# Patient Record
Sex: FEMALE | Race: WHITE | NOT HISPANIC OR LATINO | Employment: FULL TIME | URBAN - METROPOLITAN AREA
[De-identification: names, ages, dates, MRNs, and addresses within clinical notes are randomized per-mention and may not be internally consistent; named-entity substitution may affect disease eponyms.]

---

## 2018-09-18 ENCOUNTER — ANNUAL EXAM (OUTPATIENT)
Dept: OBGYN CLINIC | Facility: CLINIC | Age: 48
End: 2018-09-18
Payer: COMMERCIAL

## 2018-09-18 VITALS
WEIGHT: 126 LBS | SYSTOLIC BLOOD PRESSURE: 116 MMHG | HEIGHT: 63 IN | DIASTOLIC BLOOD PRESSURE: 78 MMHG | BODY MASS INDEX: 22.32 KG/M2

## 2018-09-18 DIAGNOSIS — Z12.39 BREAST CANCER SCREENING: ICD-10-CM

## 2018-09-18 DIAGNOSIS — Z01.419 ENCNTR FOR GYN EXAM (GENERAL) (ROUTINE) W/O ABN FINDINGS: Primary | ICD-10-CM

## 2018-09-18 DIAGNOSIS — N92.0 MENORRHAGIA WITH REGULAR CYCLE: ICD-10-CM

## 2018-09-18 PROCEDURE — S0610 ANNUAL GYNECOLOGICAL EXAMINA: HCPCS | Performed by: NURSE PRACTITIONER

## 2018-09-18 RX ORDER — LEVONORGESTREL AND ETHINYL ESTRADIOL 100-20(84)
1 KIT ORAL DAILY
COMMUNITY
End: 2018-09-18 | Stop reason: SDUPTHER

## 2018-09-18 RX ORDER — FLUTICASONE PROPIONATE 50 MCG
2 SPRAY, SUSPENSION (ML) NASAL DAILY
COMMUNITY
End: 2019-05-20 | Stop reason: SDUPTHER

## 2018-09-18 RX ORDER — LEVONORGESTREL AND ETHINYL ESTRADIOL 100-20(84)
1 KIT ORAL DAILY
Qty: 90 TABLET | Refills: 3 | Status: SHIPPED | OUTPATIENT
Start: 2018-09-18 | End: 2019-10-24 | Stop reason: SDUPTHER

## 2018-09-18 NOTE — PROGRESS NOTES
Assessment/Plan   Diagnoses and all orders for this visit:    Encntr for gyn exam (general) (routine) w/o abn findings  -     GP Liquid-Based Pap + HPV Plus    Breast cancer screening  -     Mammo screening bilateral w cad; Future    Menorrhagia with regular cycle  -     Levonorgest-Eth Estrad 91-Day (CAMRESE LO) 0 1-0 02 & 0 01 MG TABS; Take 1 tablet by mouth daily    Other orders  -     fluticasone (FLONASE) 50 mcg/act nasal spray; 2 sprays into each nostril daily  -     Discontinue: Levonorgest-Eth Estrad 91-Day (CAMRESE LO) 0 1-0 02 & 0 01 MG TABS; Take 1 tablet by mouth daily        Discussion    Reviewed normal exam today  Pap with HPV done today  Pt would like to restart OCP to help with heavy bleeding, worked well in past    Reviewed when to start  What to do if misses pill  Recommended condom use for STD protection and back up method for at least first month after starting pill or if misses more than 2 pills in the pack  Reviewed common side effects of pill including N/V, HA, Breast Pain, Weight gain, bloating, mood swings  Reassured side effects diminished after first month or two on the pill  Reviewed clotting risk adn S/S of PE, DVT, MI, and stroke  Pt had a few questions about menopause  Menopause reviewed with patient including some effects she may notice, hot flashes, night sweats, mood swings, vaginal dryness  Reviewed common menstrual patterns  If begins to have symptoms and become bothersome can schedule appointment to discuss menopause  Normal breast exam today  Monthly SBEs advised  Mammograms yearly  Rx for mammogram given  Will dip urine in office to test for UTI  Encourage at least 1200 mg calcium citrate + 2000 IUs vitamin D3 divided through diet and supplement throughout the day  Encourage 30-40 min weight bearing exercise most days of week  Colon cancer screening with a colonoscopy is recommended starting at age 39 and reviewed benefits    All questions have been answered to her satisfaction  RTO for annual or sooner if needed    Subjective     Carmenza De Oliveira is a 53 y o  female old/new patient who presents for annual well woman exam    Last exam Thinks about 2 years ago Pap Normal per patient  Pap guidelines reviewed with patient  Pt would like Pap today  Pt denies any abnormal vaginal discharge, itching, or odor  Pt not currently in a sexual relationship and denies the need for STD testing today  Menstrual Cycle:  LMP: 18  Period Cycle (Days): 22  Period Duration (Days): 5-6  Period Pattern: Regular  Menstrual Flow: Heavy  Menstrual Control: Tampon, Maxi pad  Dysmenorrhea: None  Denies any menopausal symptoms  OB History      Para Term  AB Living    3 3 3     3    SAB TAB Ectopic Multiple Live Births   Contraception: None was previously on Sprintec, worked well would like to start to help with heavy menses  Practices monthly SBEs, no breast complaints today  Last Mammogram Never had one  Colonoscopy 10 years ago  Denies any bowel issues  Does state some mild dysuria at times  Denies any frequency, hesitancy, urgency, fevers, chills, or back pain  Has noticed some mild pain x a few months  Pt follows with PCP for regular check-ups and blood work  Review of Systems   All other systems reviewed and are negative        The following portions of the patient's history were reviewed and updated as appropriate: allergies, current medications, past family history, past medical history, past social history, past surgical history and problem list     Past Medical History:   Diagnosis Date    Cataracts, bilateral     Liver hemangioma        Past Surgical History:   Procedure Laterality Date    KNEE SURGERY Left 2006    RECTAL PROLAPSE REPAIR      SMALL INTESTINE SURGERY         Family History   Problem Relation Age of Onset    Hypertension Mother     Hyperlipidemia Mother     Heart attack Father     Hypertension Father     Hyperlipidemia Father  Migraines Sister     Migraines Brother     Breast cancer Maternal Grandmother        Social History     Social History    Marital status:      Spouse name: N/A    Number of children: N/A    Years of education: N/A     Occupational History    Not on file  Social History Main Topics    Smoking status: Never Smoker    Smokeless tobacco: Never Used    Alcohol use No    Drug use: Yes    Sexual activity: Not Currently     Partners: Male     Birth control/ protection: None     Other Topics Concern    Not on file     Social History Narrative    No narrative on file         Current Outpatient Prescriptions:     fluticasone (FLONASE) 50 mcg/act nasal spray, 2 sprays into each nostril daily, Disp: , Rfl:     Levonorgest-Eth Estrad 91-Day (CAMRESE LO) 0 1-0 02 & 0 01 MG TABS, Take 1 tablet by mouth daily, Disp: 90 tablet, Rfl: 3    Allergies   Allergen Reactions    No Active Allergies        Objective   Vitals:    09/18/18 0804   BP: 116/78   BP Location: Left arm   Patient Position: Sitting   Cuff Size: Standard   Weight: 57 2 kg (126 lb)   Height: 5' 2 5" (1 588 m)     Physical Exam   Constitutional: She is oriented to person, place, and time  She appears well-developed and well-nourished  HENT:   Head: Normocephalic  Neck: Normal range of motion  Neck supple  No tracheal deviation present  No thyromegaly present  Cardiovascular: Normal rate, regular rhythm and normal heart sounds  Pulmonary/Chest: Effort normal and breath sounds normal  Right breast exhibits no inverted nipple, no mass, no nipple discharge, no skin change and no tenderness  Left breast exhibits no inverted nipple, no mass, no nipple discharge, no skin change and no tenderness  Breasts are symmetrical    Abdominal: Soft  Bowel sounds are normal  She exhibits no distension and no mass  There is no tenderness  There is no rebound and no guarding     Genitourinary: Vagina normal and uterus normal  Rectal exam shows guaiac negative stool  No breast swelling, tenderness, discharge or bleeding  No labial fusion  There is no rash, tenderness, lesion or injury on the right labia  There is no rash, tenderness, lesion or injury on the left labia  Cervix exhibits no motion tenderness, no discharge and no friability  Right adnexum displays no mass, no tenderness and no fullness  Left adnexum displays no mass, no tenderness and no fullness  Musculoskeletal: Normal range of motion  Neurological: She is alert and oriented to person, place, and time  Skin: Skin is warm and dry  Psychiatric: She has a normal mood and affect   Her behavior is normal  Judgment and thought content normal

## 2018-09-23 LAB
HPV HR 12 DNA CVX QL NAA+PROBE: NOT DETECTED
HPV16 DNA SPEC QL NAA+PROBE: NOT DETECTED
HPV18 DNA SPEC QL NAA+PROBE: NOT DETECTED
THIN PREP CVX: NORMAL

## 2019-05-14 ENCOUNTER — HOSPITAL ENCOUNTER (OUTPATIENT)
Dept: MAMMOGRAPHY | Facility: HOSPITAL | Age: 49
Discharge: HOME | End: 2019-05-14
Payer: COMMERCIAL

## 2019-05-14 VITALS — HEIGHT: 63 IN | BODY MASS INDEX: 22.32 KG/M2 | WEIGHT: 126 LBS

## 2019-05-14 DIAGNOSIS — Z12.39 BREAST CANCER SCREENING: ICD-10-CM

## 2019-05-14 PROCEDURE — 77063 BREAST TOMOSYNTHESIS BI: CPT

## 2019-05-14 PROCEDURE — 77067 SCR MAMMO BI INCL CAD: CPT

## 2019-05-20 ENCOUNTER — OFFICE VISIT (OUTPATIENT)
Dept: URGENT CARE | Facility: CLINIC | Age: 49
End: 2019-05-20

## 2019-05-20 VITALS
SYSTOLIC BLOOD PRESSURE: 118 MMHG | OXYGEN SATURATION: 98 % | DIASTOLIC BLOOD PRESSURE: 78 MMHG | TEMPERATURE: 98.6 F | HEART RATE: 58 BPM | RESPIRATION RATE: 16 BRPM

## 2019-05-20 DIAGNOSIS — Z91.09 ENVIRONMENTAL ALLERGIES: Primary | ICD-10-CM

## 2019-05-20 PROBLEM — F41.1 GENERALIZED ANXIETY DISORDER: Status: ACTIVE | Noted: 2019-04-08

## 2019-05-20 PROBLEM — J30.9 ALLERGIC RHINITIS: Status: ACTIVE | Noted: 2019-05-20

## 2019-05-20 PROBLEM — H93.19 TINNITUS: Status: ACTIVE | Noted: 2019-05-20

## 2019-05-20 RX ORDER — ALPRAZOLAM 0.25 MG/1
TABLET ORAL DAILY
COMMUNITY

## 2019-05-20 RX ORDER — FLUTICASONE PROPIONATE 50 MCG
2 SPRAY, SUSPENSION (ML) NASAL DAILY
Qty: 1 BOTTLE | Refills: 0 | Status: SHIPPED | OUTPATIENT
Start: 2019-05-20 | End: 2021-02-23

## 2019-05-22 ENCOUNTER — OFFICE VISIT (OUTPATIENT)
Dept: URGENT CARE | Facility: CLINIC | Age: 49
End: 2019-05-22

## 2019-05-22 VITALS
OXYGEN SATURATION: 100 % | HEART RATE: 56 BPM | DIASTOLIC BLOOD PRESSURE: 80 MMHG | RESPIRATION RATE: 16 BRPM | SYSTOLIC BLOOD PRESSURE: 126 MMHG | TEMPERATURE: 98.9 F

## 2019-05-22 DIAGNOSIS — R05.9 COUGH: Primary | ICD-10-CM

## 2019-05-22 DIAGNOSIS — J35.8 TONSILLOLITH: ICD-10-CM

## 2019-05-22 DIAGNOSIS — Z91.09 ENVIRONMENTAL ALLERGIES: ICD-10-CM

## 2019-05-22 RX ORDER — BENZONATATE 100 MG/1
100 CAPSULE ORAL 3 TIMES DAILY PRN
Qty: 20 CAPSULE | Refills: 0 | Status: SHIPPED | OUTPATIENT
Start: 2019-05-22 | End: 2021-02-23

## 2019-09-18 ENCOUNTER — OFFICE VISIT (OUTPATIENT)
Dept: URGENT CARE | Facility: CLINIC | Age: 49
End: 2019-09-18

## 2019-09-18 VITALS
TEMPERATURE: 98.6 F | OXYGEN SATURATION: 98 % | HEART RATE: 56 BPM | RESPIRATION RATE: 16 BRPM | DIASTOLIC BLOOD PRESSURE: 82 MMHG | SYSTOLIC BLOOD PRESSURE: 128 MMHG

## 2019-09-18 DIAGNOSIS — R42 VERTIGO: Primary | ICD-10-CM

## 2019-09-18 RX ORDER — MECLIZINE HCL 12.5 MG/1
12.5 TABLET ORAL EVERY 8 HOURS PRN
Qty: 20 TABLET | Refills: 0 | Status: SHIPPED | OUTPATIENT
Start: 2019-09-18 | End: 2021-02-23

## 2019-09-18 NOTE — PROGRESS NOTES
[facility]        NAME: Kary Cobb is a 51 y o  female  : 1970    MRN: [id number]  DATE: 2019  TIME: 2:28 PM    Assessment and Plan   Vertigo [R42]  1  Vertigo  meclizine (ANTIVERT) 12 5 MG tablet         Patient Instructions     Take Meclizine as needed for vertigo symptoms  Increase your water intake  Change positions slowly  Follow up with your PCP or return to the clinic if symptoms worsen or persist more than 5-7 days  Proceed to  ER if symptoms worsen  Chief Complaint     Chief Complaint   Patient presents with    Dizziness         History of Present Illness       Pt was awoken from sleep 2 nights ago by her dog and felt dizzy when sitting up  Symptoms passed quickly  She has had a few "waves" of dizziness since then briefly  Denies falling or syncope  She vomited 2-3 times due to the dizziness and nausea  Occurs when turning her head, moving her eyes, or changing positions quickly  She has been eating regularly  Review of Systems   Review of Systems   Constitutional: Negative  HENT: Negative  Eyes: Negative  Respiratory: Negative  Cardiovascular: Negative  Neurological: Positive for dizziness (when changing positions or turning her head quickly)  All other systems reviewed and are negative          Current Medications       Current Outpatient Medications:     ALPRAZolam (XANAX) 0 25 mg tablet, Daily, Disp: , Rfl:     Levonorgest-Eth Estrad -Day (CAMRESE LO) 0 1-0 02 & 0 01 MG TABS, Take 1 tablet by mouth daily, Disp: 90 tablet, Rfl: 3    benzonatate (TESSALON PERLES) 100 mg capsule, Take 1 capsule (100 mg total) by mouth 3 (three) times a day as needed for cough Swallow whole, do not crush or chew (Patient not taking: Reported on 2019), Disp: 20 capsule, Rfl: 0    fluticasone (FLONASE) 50 mcg/act nasal spray, 2 sprays into each nostril daily for 30 days, Disp: 1 Bottle, Rfl: 0    meclizine (ANTIVERT) 12 5 MG tablet, Take 1 tablet (12 5 mg total) by mouth every 8 (eight) hours as needed for dizziness for up to 7 days, Disp: 20 tablet, Rfl: 0    Current Allergies     Allergies as of 09/18/2019 - Reviewed 09/18/2019   Allergen Reaction Noted    No active allergies              The following portions of the patient's history were reviewed and updated as appropriate: allergies, current medications, past family history, past medical history, past social history, past surgical history and problem list      Past Medical History:   Diagnosis Date    Allergic rhinitis     Cataracts, bilateral     Liver hemangioma        Past Surgical History:   Procedure Laterality Date    KNEE SURGERY Left 12/2006    RECTAL PROLAPSE REPAIR      SMALL INTESTINE SURGERY  2004       Family History   Problem Relation Age of Onset    Hypertension Mother     Hyperlipidemia Mother     Heart attack Father     Hypertension Father     Hyperlipidemia Father     Migraines Sister     Migraines Brother     Breast cancer Maternal Grandmother 55         Medications have been verified  Objective   /82 (BP Location: Right arm, Patient Position: Sitting, Cuff Size: Adult)   Pulse 56   Temp 98 6 °F (37 °C) (Tympanic)   Resp 16   SpO2 98%   Breastfeeding? No        Physical Exam     Physical Exam   Constitutional: She is oriented to person, place, and time  She appears well-developed and well-nourished  HENT:   Head: Normocephalic and atraumatic  Right Ear: Hearing, tympanic membrane, external ear and ear canal normal    Left Ear: Hearing, tympanic membrane, external ear and ear canal normal    Nose: Nose normal    Mouth/Throat: Oropharynx is clear and moist    Vertigo symptoms reproducible in the office when turning her head quickly  Symptoms resolve within a few seconds when sitting still  Eyes: Pupils are equal, round, and reactive to light  Conjunctivae and EOM are normal  Right eye exhibits no nystagmus   Left eye exhibits no nystagmus  Cardiovascular: Normal rate, regular rhythm and normal heart sounds  Pulmonary/Chest: Effort normal and breath sounds normal    Neurological: She is alert and oriented to person, place, and time  Skin: Skin is warm and dry  Psychiatric: She has a normal mood and affect  Her behavior is normal    Vitals reviewed

## 2019-09-18 NOTE — ASSESSMENT & PLAN NOTE
Meclizine PRN  Increase water intake  Stay well hydrated  Consider vestibular rehab if no improvement

## 2019-09-18 NOTE — PATIENT INSTRUCTIONS
Take Meclizine as needed for vertigo symptoms  Increase your water intake  Change positions slowly  Follow up with your PCP or return to the clinic if symptoms worsen or persist more than 5-7 days  Vertigo   AMBULATORY CARE:   Vertigo  is a condition that causes you to feel dizzy  You may feel that you or everything around you is moving or spinning  You may also feel like you are being pulled down or toward your side  Common symptoms that may happen with vertigo:   · Nausea or vomiting    · Trouble with your balance    · Sensitivity to light or sound    · Weakness, slurred speech, problems seeing or moving, or increased sleepiness    · Facial weakness and headache    · Hearing loss, ear fullness or pain, or hearing ringing sounds    · Fast, uncontrolled movement of your eyes  Seek care immediately if:   · You have a headache and a stiff neck  · You have shaking chills and a fever  · You vomit over and over with no relief  · You have blood, pus, or fluid coming out of your ears  · You are confused  Contact your healthcare provider if:   · Your symptoms do not get better with treatment  · You have questions about your condition or care  Treatment for vertigo  may include resting in bed or avoid certain activities for a time  You may need to decrease or stop taking medicines that are causing your vertigo  Medicines may also be prescribed to help relieve your symptoms  Manage your symptoms:   · Do not drive , walk without help, or operate heavy machinery when you are dizzy  · Move slowly  when you move from one position to another position  Get up slowly from sitting or lying down  Sit or lie down right away if you feel dizzy  · Drink plenty of liquids  Liquids help prevent dehydration  Ask how much liquid to drink each day and which liquids are best for you      · Vestibular and balance rehabilitation therapy (VBRT)  is used to teach you exercises to improve your balance and strength  These exercises may help decrease your vertigo and improve your balance  Ask for more information about this therapy  Follow up with your healthcare provider as directed:  Write down your questions so you remember to ask them during your visits  © 2017 [employer] Information is for End User's use only and may not be sold, redistributed or otherwise used for commercial purposes  All illustrations and images included in CareNotes® are the copyrighted property of [employer]  or [employer]  The above information is an  only  It is not intended as medical advice for individual conditions or treatments  Talk to your doctor, nurse or pharmacist before following any medical regimen to see if it is safe and effective for you

## 2019-10-15 ENCOUNTER — APPOINTMENT (OUTPATIENT)
Dept: URGENT CARE | Facility: CLINIC | Age: 49
End: 2019-10-15

## 2019-10-15 ENCOUNTER — TRANSCRIBE ORDERS (OUTPATIENT)
Dept: URGENT CARE | Facility: CLINIC | Age: 49
End: 2019-10-15

## 2019-10-15 VITALS
SYSTOLIC BLOOD PRESSURE: 122 MMHG | DIASTOLIC BLOOD PRESSURE: 80 MMHG | WEIGHT: 131 LBS | BODY MASS INDEX: 24.11 KG/M2 | HEIGHT: 62 IN

## 2019-10-15 DIAGNOSIS — Z00.8 ENCOUNTER FOR BIOMETRIC SCREENING: Primary | ICD-10-CM

## 2019-10-15 DIAGNOSIS — Z00.8 ENCOUNTER FOR BIOMETRIC SCREENING: ICD-10-CM

## 2019-10-15 DIAGNOSIS — Z01.89 ENCOUNTER FOR TOBACCO USE SCREENING: Primary | ICD-10-CM

## 2019-10-15 DIAGNOSIS — Z13.6 SCREENING FOR CARDIOVASCULAR CONDITION: ICD-10-CM

## 2019-10-15 PROCEDURE — G0480 DRUG TEST DEF 1-7 CLASSES: HCPCS | Performed by: NURSE PRACTITIONER

## 2019-10-15 PROCEDURE — 80323 ALKALOIDS NOS: CPT | Performed by: NURSE PRACTITIONER

## 2019-10-18 LAB
COTININE SERPL-MCNC: 1.3 NG/ML
NICOTINE SERPL-MCNC: NORMAL NG/ML

## 2019-10-24 ENCOUNTER — ANNUAL EXAM (OUTPATIENT)
Dept: OBGYN CLINIC | Facility: CLINIC | Age: 49
End: 2019-10-24
Payer: COMMERCIAL

## 2019-10-24 VITALS
SYSTOLIC BLOOD PRESSURE: 116 MMHG | DIASTOLIC BLOOD PRESSURE: 78 MMHG | WEIGHT: 131 LBS | HEIGHT: 63 IN | BODY MASS INDEX: 23.21 KG/M2

## 2019-10-24 DIAGNOSIS — Z12.31 ENCOUNTER FOR SCREENING MAMMOGRAM FOR MALIGNANT NEOPLASM OF BREAST: ICD-10-CM

## 2019-10-24 DIAGNOSIS — N92.0 MENORRHAGIA WITH REGULAR CYCLE: ICD-10-CM

## 2019-10-24 DIAGNOSIS — Z01.419 GYNECOLOGIC EXAM NORMAL: Primary | ICD-10-CM

## 2019-10-24 PROCEDURE — S0612 ANNUAL GYNECOLOGICAL EXAMINA: HCPCS | Performed by: PHYSICIAN ASSISTANT

## 2019-10-24 RX ORDER — LEVONORGESTREL AND ETHINYL ESTRADIOL 100-20(84)
1 KIT ORAL DAILY
Qty: 90 TABLET | Refills: 3 | Status: SHIPPED | OUTPATIENT
Start: 2019-10-24 | End: 2020-12-01

## 2019-10-24 NOTE — ASSESSMENT & PLAN NOTE
Pap guidelines reviewed  Patient currently has menses  Will plan to repeat pap in 1 year to attempt to capture transformation zone, reviewed with patient recommend to evaluate TZ once every 3 years  Reassuring not sexually active currently and history of always normal pap smears  Will plan to continue Camrese OCP  Return to office for annual or as needed

## 2019-10-24 NOTE — PROGRESS NOTES
Assessment/Plan   Problem List Items Addressed This Visit        Other    Gynecologic exam normal - Primary     Pap guidelines reviewed  Patient currently has menses  Will plan to repeat pap in 1 year to attempt to capture transformation zone, reviewed with patient recommend to evaluate TZ once every 3 years  Reassuring not sexually active currently and history of always normal pap smears  Will plan to continue Camrese OCP  Return to office for annual or as needed  Other Visit Diagnoses     Encounter for screening mammogram for malignant neoplasm of breast        Relevant Orders    Mammo screening bilateral w 3d & cad    Menorrhagia with regular cycle        Relevant Medications    Levonorgest-Eth Estrad 91-Day (CAMRESE LO) 0 1-0 02 & 0 01 MG TABS          Subjective:     Patient ID: Cha Santos is a 51 y o  y o  female  HPI  52 yo seen for annual exam  Currently on Camrese OCP, tolerating well would like to continue  Denies bowel or bladder issues  Last pap: 9/18/2018 NILM (-)HRHPV  No TZ seen  Last mammogram: 5/14/2019 BIRADS 1: Negative  The following portions of the patient's history were reviewed and updated as appropriate:   She  has a past medical history of Allergic rhinitis, Cataracts, bilateral, and Liver hemangioma  She   Patient Active Problem List    Diagnosis Date Noted    Gynecologic exam normal 10/24/2019    Vertigo 09/18/2019    Cough 05/22/2019    Tonsillolith 05/22/2019    Environmental allergies 05/20/2019    Tinnitus 05/20/2019    Generalized anxiety disorder 04/08/2019     She  has a past surgical history that includes Small intestine surgery (2004); Rectal prolapse repair; and Knee surgery (Left, 12/2006)  Her family history includes Breast cancer (age of onset: 54) in her maternal grandmother; Heart attack in her father; Hyperlipidemia in her father and mother; Hypertension in her father and mother; Migraines in her brother and sister    She  reports that she quit smoking about 25 years ago  Her smoking use included cigarettes  She smoked 0 25 packs per day  She has never used smokeless tobacco  She reports that she does not drink alcohol or use drugs  Current Outpatient Medications   Medication Sig Dispense Refill    ALPRAZolam (XANAX) 0 25 mg tablet Daily      benzonatate (TESSALON PERLES) 100 mg capsule Take 1 capsule (100 mg total) by mouth 3 (three) times a day as needed for cough Swallow whole, do not crush or chew (Patient not taking: Reported on 2019) 20 capsule 0    fluticasone (FLONASE) 50 mcg/act nasal spray 2 sprays into each nostril daily for 30 days 1 Bottle 0    Levonorgest-Eth Estrad 91-Day (CAMRESE LO) 0 1-0 02 & 0 01 MG TABS Take 1 tablet by mouth daily 90 tablet 3    meclizine (ANTIVERT) 12 5 MG tablet Take 1 tablet (12 5 mg total) by mouth every 8 (eight) hours as needed for dizziness for up to 7 days 20 tablet 0     No current facility-administered medications for this visit  She is allergic to no active allergies       Menstrual History:  OB History        3    Para   3    Term   3            AB        Living   3       SAB        TAB        Ectopic        Multiple        Live Births                    Menarche age: 16  Patient's last menstrual period was 10/21/2019 (exact date)  Period Cycle (Days): 90  Period Duration (Days): 4-5  Period Pattern: Regular  Menstrual Flow: Moderate  Dysmenorrhea: None      Review of Systems   Constitutional: Negative for fatigue, fever and unexpected weight change  HENT: Negative for dental problem and sinus pressure  Eyes: Negative for visual disturbance  Respiratory: Negative for cough, shortness of breath and wheezing  Cardiovascular: Negative for chest pain  Gastrointestinal: Negative for abdominal pain, blood in stool, constipation, diarrhea, nausea and vomiting  Endocrine: Negative for polydipsia     Genitourinary: Negative for difficulty urinating, dyspareunia, dysuria, frequency, hematuria, pelvic pain and urgency  Musculoskeletal: Negative for arthralgias and back pain  Neurological: Negative for dizziness, seizures, light-headedness and headaches  Psychiatric/Behavioral: Negative for suicidal ideas  The patient is not nervous/anxious  Objective:  Vitals:    10/24/19 1509   BP: 116/78   BP Location: Left arm   Patient Position: Sitting   Cuff Size: Standard   Weight: 59 4 kg (131 lb)   Height: 5' 3" (1 6 m)      Physical Exam   Constitutional: She is oriented to person, place, and time  She appears well-developed and well-nourished  Genitourinary: Rectum normal and uterus normal  There is no rash, tenderness, lesion, injury or Bartholin's cyst on the right labia  There is no rash, tenderness, lesion, injury or Bartholin's cyst on the left labia  Vagina exhibits no lesion  There is bleeding in the vagina  No erythema or tenderness in the vagina  No signs of injury around the vagina  No vaginal discharge found  Right adnexum does not display mass, does not display tenderness and does not display fullness  Left adnexum does not display mass, does not display tenderness and does not display fullness  Cervix does not exhibit motion tenderness, lesion or discharge  Uterus is not enlarged, tender, exhibiting a mass, irregular (is regular) or mobile  Rectal exam shows no external hemorrhoid, no internal hemorrhoid, no fissure, no mass, no tenderness, anal tone normal and guaiac negative stool  HENT:   Head: Normocephalic and atraumatic  Neck: No thyromegaly present  Cardiovascular: Normal rate, regular rhythm and normal heart sounds  Exam reveals no gallop and no friction rub  No murmur heard  Pulmonary/Chest: Effort normal and breath sounds normal  No respiratory distress  She has no wheezes  Right breast exhibits no inverted nipple, no mass, no nipple discharge, no skin change and no tenderness   Left breast exhibits no inverted nipple, no mass, no nipple discharge, no skin change and no tenderness  No breast swelling, tenderness, discharge or bleeding  Breasts are symmetrical    Abdominal: Soft  She exhibits no distension and no mass  There is no tenderness  There is no rebound and no guarding  No hernia  Lymphadenopathy:     She has no cervical adenopathy  Right: No inguinal adenopathy present  Left: No inguinal adenopathy present  Neurological: She is alert and oriented to person, place, and time  Skin: Skin is warm and dry  Psychiatric: She has a normal mood and affect   Her behavior is normal

## 2020-06-22 ENCOUNTER — LAB REQUISITION (OUTPATIENT)
Dept: LAB | Facility: HOSPITAL | Age: 50
End: 2020-06-22
Payer: COMMERCIAL

## 2020-06-22 ENCOUNTER — APPOINTMENT (OUTPATIENT)
Dept: URGENT CARE | Facility: CLINIC | Age: 50
End: 2020-06-22

## 2020-06-22 DIAGNOSIS — E55.9 VITAMIN D DEFICIENCY, UNSPECIFIED: ICD-10-CM

## 2020-06-22 DIAGNOSIS — E55.9 VITAMIN D DEFICIENCY: ICD-10-CM

## 2020-06-22 DIAGNOSIS — Z11.59 ENCOUNTER FOR SCREENING FOR OTHER VIRAL DISEASES: ICD-10-CM

## 2020-06-22 DIAGNOSIS — Z00.00 ENCOUNTER FOR GENERAL ADULT MEDICAL EXAMINATION WITHOUT ABNORMAL FINDINGS: ICD-10-CM

## 2020-06-22 DIAGNOSIS — E78.5 HYPERLIPIDEMIA, UNSPECIFIED: ICD-10-CM

## 2020-06-22 DIAGNOSIS — E78.5 HYPERLIPIDEMIA, UNSPECIFIED HYPERLIPIDEMIA TYPE: Primary | ICD-10-CM

## 2020-06-22 DIAGNOSIS — Z00.00 WELL ADULT EXAM: ICD-10-CM

## 2020-06-22 LAB
25(OH)D3 SERPL-MCNC: 20.3 NG/ML (ref 30–100)
ALBUMIN SERPL BCP-MCNC: 3.5 G/DL (ref 3.5–5)
ALP SERPL-CCNC: 53 U/L (ref 46–116)
ALT SERPL W P-5'-P-CCNC: 14 U/L (ref 12–78)
ANION GAP SERPL CALCULATED.3IONS-SCNC: 5 MMOL/L (ref 4–13)
AST SERPL W P-5'-P-CCNC: 12 U/L (ref 5–45)
BACTERIA UR QL AUTO: ABNORMAL /HPF
BASOPHILS # BLD AUTO: 0.04 THOUSANDS/ΜL (ref 0–0.1)
BASOPHILS NFR BLD AUTO: 1 % (ref 0–1)
BILIRUB SERPL-MCNC: 0.49 MG/DL (ref 0.2–1)
BILIRUB UR QL STRIP: NEGATIVE
BUN SERPL-MCNC: 9 MG/DL (ref 5–25)
CALCIUM SERPL-MCNC: 9.4 MG/DL (ref 8.3–10.1)
CHLORIDE SERPL-SCNC: 109 MMOL/L (ref 100–108)
CHOLEST SERPL-MCNC: 227 MG/DL (ref 50–200)
CLARITY UR: ABNORMAL
CO2 SERPL-SCNC: 24 MMOL/L (ref 21–32)
COLOR UR: ABNORMAL
CREAT SERPL-MCNC: 0.83 MG/DL (ref 0.6–1.3)
EOSINOPHIL # BLD AUTO: 0.04 THOUSAND/ΜL (ref 0–0.61)
EOSINOPHIL NFR BLD AUTO: 1 % (ref 0–6)
ERYTHROCYTE [DISTWIDTH] IN BLOOD BY AUTOMATED COUNT: 13.9 % (ref 11.6–15.1)
GFR SERPL CREATININE-BSD FRML MDRD: 83 ML/MIN/1.73SQ M
GLUCOSE SERPL-MCNC: 78 MG/DL (ref 65–140)
GLUCOSE UR STRIP-MCNC: NEGATIVE MG/DL
HCT VFR BLD AUTO: 40.6 % (ref 34.8–46.1)
HDLC SERPL-MCNC: 69 MG/DL
HGB BLD-MCNC: 13 G/DL (ref 11.5–15.4)
HGB UR QL STRIP.AUTO: ABNORMAL
HYALINE CASTS #/AREA URNS LPF: ABNORMAL /LPF
IMM GRANULOCYTES # BLD AUTO: 0.02 THOUSAND/UL (ref 0–0.2)
IMM GRANULOCYTES NFR BLD AUTO: 0 % (ref 0–2)
KETONES UR STRIP-MCNC: NEGATIVE MG/DL
LDLC SERPL CALC-MCNC: 140 MG/DL (ref 0–100)
LEUKOCYTE ESTERASE UR QL STRIP: NEGATIVE
LYMPHOCYTES # BLD AUTO: 1.14 THOUSANDS/ΜL (ref 0.6–4.47)
LYMPHOCYTES NFR BLD AUTO: 23 % (ref 14–44)
MCH RBC QN AUTO: 30.2 PG (ref 26.8–34.3)
MCHC RBC AUTO-ENTMCNC: 32 G/DL (ref 31.4–37.4)
MCV RBC AUTO: 94 FL (ref 82–98)
MONOCYTES # BLD AUTO: 0.55 THOUSAND/ΜL (ref 0.17–1.22)
MONOCYTES NFR BLD AUTO: 11 % (ref 4–12)
NEUTROPHILS # BLD AUTO: 3.1 THOUSANDS/ΜL (ref 1.85–7.62)
NEUTS SEG NFR BLD AUTO: 64 % (ref 43–75)
NITRITE UR QL STRIP: NEGATIVE
NON-SQ EPI CELLS URNS QL MICRO: ABNORMAL /HPF
NONHDLC SERPL-MCNC: 158 MG/DL
NRBC BLD AUTO-RTO: 0 /100 WBCS
PH UR STRIP.AUTO: 5 [PH]
PLATELET # BLD AUTO: 365 THOUSANDS/UL (ref 149–390)
PMV BLD AUTO: 10.4 FL (ref 8.9–12.7)
POTASSIUM SERPL-SCNC: 4.2 MMOL/L (ref 3.5–5.3)
PROT SERPL-MCNC: 7.3 G/DL (ref 6.4–8.2)
PROT UR STRIP-MCNC: NEGATIVE MG/DL
RBC # BLD AUTO: 4.3 MILLION/UL (ref 3.81–5.12)
RBC #/AREA URNS AUTO: ABNORMAL /HPF
SODIUM SERPL-SCNC: 138 MMOL/L (ref 136–145)
SP GR UR STRIP.AUTO: 1.02 (ref 1–1.03)
T4 FREE SERPL-MCNC: 0.84 NG/DL (ref 0.76–1.46)
TRIGL SERPL-MCNC: 89 MG/DL
TSH SERPL DL<=0.05 MIU/L-ACNC: 9.03 UIU/ML (ref 0.36–3.74)
UROBILINOGEN UR QL STRIP.AUTO: 0.2 E.U./DL
WBC # BLD AUTO: 4.89 THOUSAND/UL (ref 4.31–10.16)
WBC #/AREA URNS AUTO: ABNORMAL /HPF

## 2020-06-22 PROCEDURE — 80053 COMPREHEN METABOLIC PANEL: CPT

## 2020-06-22 PROCEDURE — 82306 VITAMIN D 25 HYDROXY: CPT

## 2020-06-22 PROCEDURE — 84443 ASSAY THYROID STIM HORMONE: CPT

## 2020-06-22 PROCEDURE — 84439 ASSAY OF FREE THYROXINE: CPT

## 2020-06-22 PROCEDURE — 85025 COMPLETE CBC W/AUTO DIFF WBC: CPT

## 2020-06-22 PROCEDURE — 80061 LIPID PANEL: CPT

## 2020-06-22 PROCEDURE — 81001 URINALYSIS AUTO W/SCOPE: CPT

## 2020-07-10 ENCOUNTER — TRANSCRIBE ORDERS (OUTPATIENT)
Dept: ADMINISTRATIVE | Facility: HOSPITAL | Age: 50
End: 2020-07-10

## 2020-07-10 DIAGNOSIS — E04.9 THYROID ENLARGEMENT: Primary | ICD-10-CM

## 2020-07-21 ENCOUNTER — HOSPITAL ENCOUNTER (OUTPATIENT)
Dept: ULTRASOUND IMAGING | Facility: HOSPITAL | Age: 50
Discharge: HOME | End: 2020-07-21
Payer: COMMERCIAL

## 2020-07-21 DIAGNOSIS — E04.9 THYROID ENLARGEMENT: ICD-10-CM

## 2020-07-21 PROCEDURE — 76536 US EXAM OF HEAD AND NECK: CPT

## 2020-07-29 ENCOUNTER — HOSPITAL ENCOUNTER (OUTPATIENT)
Dept: MRI IMAGING | Facility: HOSPITAL | Age: 50
Discharge: HOME | End: 2020-07-29
Attending: OTOLARYNGOLOGY
Payer: COMMERCIAL

## 2020-07-29 DIAGNOSIS — IMO0001 ASYMMETRICAL RIGHT SENSORINEURAL HEARING LOSS: ICD-10-CM

## 2020-07-29 PROCEDURE — 70553 MRI BRAIN STEM W/O & W/DYE: CPT

## 2020-07-29 PROCEDURE — A9585 GADOBUTROL INJECTION: HCPCS | Performed by: OTOLARYNGOLOGY

## 2020-07-29 RX ADMIN — GADOBUTROL 5 ML: 604.72 INJECTION INTRAVENOUS at 18:38

## 2020-10-05 ENCOUNTER — APPOINTMENT (OUTPATIENT)
Dept: URGENT CARE | Facility: CLINIC | Age: 50
End: 2020-10-05

## 2020-10-05 DIAGNOSIS — E03.9 HYPOTHYROIDISM, UNSPECIFIED TYPE: Primary | ICD-10-CM

## 2020-10-05 PROCEDURE — 84443 ASSAY THYROID STIM HORMONE: CPT

## 2020-10-05 PROCEDURE — 84439 ASSAY OF FREE THYROXINE: CPT

## 2020-10-06 LAB
T4 FREE SERPL-MCNC: 1.02 NG/DL (ref 0.76–1.46)
TSH SERPL DL<=0.05 MIU/L-ACNC: 2.92 UIU/ML (ref 0.36–3.74)

## 2020-10-20 ENCOUNTER — APPOINTMENT (OUTPATIENT)
Dept: URGENT CARE | Facility: CLINIC | Age: 50
End: 2020-10-20

## 2020-10-20 VITALS
HEART RATE: 62 BPM | SYSTOLIC BLOOD PRESSURE: 144 MMHG | RESPIRATION RATE: 16 BRPM | DIASTOLIC BLOOD PRESSURE: 86 MMHG | OXYGEN SATURATION: 99 %

## 2020-10-20 DIAGNOSIS — R03.0 ELEVATED BLOOD-PRESSURE READING WITHOUT DIAGNOSIS OF HYPERTENSION: Primary | ICD-10-CM

## 2020-10-20 PROBLEM — E03.4 IDIOPATHIC ATROPHIC HYPOTHYROIDISM: Status: ACTIVE | Noted: 2020-07-09

## 2020-10-20 PROBLEM — E55.9 VITAMIN D DEFICIENCY: Status: ACTIVE | Noted: 2020-02-17

## 2020-11-09 ENCOUNTER — APPOINTMENT (OUTPATIENT)
Dept: URGENT CARE | Facility: CLINIC | Age: 50
End: 2020-11-09

## 2020-11-09 VITALS
HEART RATE: 72 BPM | DIASTOLIC BLOOD PRESSURE: 78 MMHG | RESPIRATION RATE: 16 BRPM | OXYGEN SATURATION: 98 % | SYSTOLIC BLOOD PRESSURE: 140 MMHG

## 2020-11-09 DIAGNOSIS — R03.0 ELEVATED BLOOD-PRESSURE READING WITHOUT DIAGNOSIS OF HYPERTENSION: Primary | ICD-10-CM

## 2020-11-29 DIAGNOSIS — N92.0 MENORRHAGIA WITH REGULAR CYCLE: ICD-10-CM

## 2020-12-01 RX ORDER — LEVONORGESTREL/ETHINYL ESTRADIOL AND ETHINYL ESTRADIOL 100-20(84)
KIT ORAL
Qty: 91 EACH | Refills: 0 | Status: SHIPPED | OUTPATIENT
Start: 2020-12-01 | End: 2021-02-23 | Stop reason: SDUPTHER

## 2021-01-13 ENCOUNTER — IMMUNIZATIONS (OUTPATIENT)
Dept: FAMILY MEDICINE CLINIC | Facility: HOSPITAL | Age: 51
End: 2021-01-13

## 2021-01-13 DIAGNOSIS — Z23 ENCOUNTER FOR IMMUNIZATION: ICD-10-CM

## 2021-01-13 PROCEDURE — 91300 SARS-COV-2 / COVID-19 MRNA VACCINE (PFIZER-BIONTECH) 30 MCG: CPT

## 2021-01-13 PROCEDURE — 0001A SARS-COV-2 / COVID-19 MRNA VACCINE (PFIZER-BIONTECH) 30 MCG: CPT

## 2021-02-03 ENCOUNTER — IMMUNIZATIONS (OUTPATIENT)
Dept: FAMILY MEDICINE CLINIC | Facility: HOSPITAL | Age: 51
End: 2021-02-03

## 2021-02-03 DIAGNOSIS — Z23 ENCOUNTER FOR IMMUNIZATION: Primary | ICD-10-CM

## 2021-02-03 PROCEDURE — 91300 SARS-COV-2 / COVID-19 MRNA VACCINE (PFIZER-BIONTECH) 30 MCG: CPT

## 2021-02-03 PROCEDURE — 0002A SARS-COV-2 / COVID-19 MRNA VACCINE (PFIZER-BIONTECH) 30 MCG: CPT

## 2021-02-22 PROBLEM — Z13.31 SCREENING FOR DEPRESSION: Status: ACTIVE | Noted: 2020-07-09

## 2021-02-22 PROBLEM — R94.6 ABNORMAL FINDING ON THYROID FUNCTION TEST: Status: ACTIVE | Noted: 2020-02-17

## 2021-02-22 RX ORDER — LEVOTHYROXINE SODIUM 0.05 MG/1
TABLET ORAL
COMMUNITY
Start: 2021-01-08 | End: 2022-04-06

## 2021-02-22 RX ORDER — INFLUENZA VIRUS VACCINE 15; 15; 15; 15 UG/.5ML; UG/.5ML; UG/.5ML; UG/.5ML
SUSPENSION INTRAMUSCULAR
COMMUNITY
End: 2022-04-06

## 2021-02-22 RX ORDER — VENLAFAXINE HYDROCHLORIDE 37.5 MG/1
CAPSULE, EXTENDED RELEASE ORAL
COMMUNITY
Start: 2021-01-25 | End: 2022-04-06

## 2021-02-22 NOTE — PROGRESS NOTES
Assessment/Plan   Problem List Items Addressed This Visit        Other    Gynecologic exam normal - Primary     Pap guidelines reviewed  Pap with reflex done today secondary to no TZ seen in 2018  Reviewed recommendation to have [address] and LH drawn after being off OCP for 7 days to see if in menopausal range and if can discontinue OCP  Script given  Reviewed cervical polyp found on today's exam  Will return to office for removal    Reviewed mammogram guidelines, script given today  Reviewed coughing during sleeping, normal thyroid ultrasound in 7/2020  Recommend follow up with PCP may need sleep study  Return to office for cervical polyp removal           Relevant Orders    Liquid-based pap, screening      Other Visit Diagnoses     Amenorrhea        Relevant Orders    Follicle stimulating hormone    Luteinizing hormone    Encounter for screening mammogram for malignant neoplasm of breast        Relevant Orders    Mammo screening bilateral w 3d & cad    Menorrhagia with regular cycle        Relevant Medications    Levonorgest-Eth Estrad 91-Day (Camrese Lo) 0 1-0 02 & 0 01 MG TABS          Subjective:     Patient ID: Isreal Garcia is a 49 y o  y o  female  HPI  50 yo seen for annual exam  Currently on Camrese Lo OCP tolerating well would like to continue  Denies bowel or bladder issues  Patient reports having coughing fits that wake her up at night  Denies any reflux  Last pap: 9/18/18 NILM (-)HRHPV No TZ seen  Last mammogram: 5/14/2019 BIRADS 1: Negative  Last colonoscopy: Done by Dr Andrew Chaves about 10 years ago, just got a letter that she is due  The following portions of the patient's history were reviewed and updated as appropriate:   She  has a past medical history of Allergic rhinitis, Cataracts, bilateral, HL (hearing loss), Liver hemangioma, and Tinnitus    She   Patient Active Problem List    Diagnosis Date Noted    Elevated blood-pressure reading without diagnosis of hypertension 10/20/2020    Idiopathic atrophic hypothyroidism 2020    Screening for depression 2020    Vitamin D deficiency 2020    Abnormal finding on thyroid function test 2020    Gynecologic exam normal 10/24/2019    Vertigo 2019    Cough 2019    Tonsillolith 2019    Environmental allergies 2019    Tinnitus 2019    Generalized anxiety disorder 2019     She  has a past surgical history that includes Small intestine surgery (); Rectal prolapse repair; and Knee surgery (Left, 2006)  Her family history includes Breast cancer (age of onset: 54) in her maternal grandmother; Heart attack in her father; Hyperlipidemia in her father and mother; Hypertension in her father and mother; Migraines in her brother and sister; Stroke in her father  She  reports that she quit smoking about 27 years ago  Her smoking use included cigarettes  She smoked 0 25 packs per day  She has never used smokeless tobacco  She reports that she does not drink alcohol or use drugs  Current Outpatient Medications   Medication Sig Dispense Refill    ALPRAZolam (XANAX) 0 25 mg tablet Daily      Levonorgest-Eth Estrad 91-Day (Camrese Lo) 0 1-0 02 & 0 01 MG TABS Take 1 tablet by mouth daily 91 each 3    levothyroxine 50 mcg tablet       Multiple Vitamins-Minerals (VITAMIN D3 COMPLETE PO) Daily      venlafaxine (EFFEXOR-XR) 37 5 mg 24 hr capsule       influenza vaccine, quadrivalent (Fluarix Quadrivalent) 0 5 ML MAYANK Fluarix Quad [id number] (PF) 60 mcg (15 mcg x 4)/0 5 mL IM syringe       No current facility-administered medications for this visit  She is allergic to no active allergies and sertraline       Menstrual History:  OB History        3    Para   3    Term   3            AB        Living   3       SAB        TAB        Ectopic        Multiple        Live Births                   No LMP recorded (lmp unknown)   Patient is perimenopausal          Review of Systems   Constitutional: Negative for fatigue, fever and unexpected weight change  HENT: Negative for dental problem and sinus pressure  Eyes: Negative for visual disturbance  Respiratory: Negative for cough, shortness of breath and wheezing  Cardiovascular: Negative for chest pain  Gastrointestinal: Negative for abdominal pain, blood in stool, constipation, diarrhea, nausea and vomiting  Endocrine: Negative for polydipsia  Genitourinary: Negative for difficulty urinating, dyspareunia, dysuria, frequency, hematuria, pelvic pain and urgency  Musculoskeletal: Negative for arthralgias and back pain  Neurological: Negative for dizziness, seizures, light-headedness and headaches  Psychiatric/Behavioral: Negative for suicidal ideas  The patient is not nervous/anxious  Objective:  Vitals:    02/23/21 1727   BP: 148/92   BP Location: Left arm   Patient Position: Sitting   Cuff Size: Standard   Weight: 57 2 kg (126 lb)   Height: 5' 2" (1 575 m)      Physical Exam  Constitutional:       Appearance: Normal appearance  She is well-developed  Genitourinary:      Pelvic exam was performed with patient supine  Vulva, urethra, bladder, vagina, uterus and rectum normal       No vulval condylomata, lesion, tenderness, ulcerations, Bartholin's cyst or rash noted  No signs of labial injury  No labial fusion  No inguinal adenopathy present in the right or left side  No urethral prolapse, pain, swelling, tenderness, caruncle, mass or diverticulum present  Bladder is not distended or tender  No signs of injury or lesions in the vagina  No vaginal discharge, erythema, tenderness or bleeding  Cervical polyp present  No cervical motion tenderness, discharge or lesion  Uterus is not enlarged, tender, irregular or mobile  No uterine mass detected  No right or left adnexal mass present  Right adnexa not tender or full        Left adnexa not tender or full  Rectum:      Guaiac result negative  No rectal mass, anal fissure, tenderness, external hemorrhoid, internal hemorrhoid or abnormal anal tone  HENT:      Head: Normocephalic and atraumatic  Neck:      Thyroid: No thyromegaly  Cardiovascular:      Rate and Rhythm: Normal rate and regular rhythm  Heart sounds: Normal heart sounds  No murmur  No friction rub  No gallop  Pulmonary:      Effort: Pulmonary effort is normal  No respiratory distress  Breath sounds: Normal breath sounds  No wheezing  Chest:      Breasts: Breasts are symmetrical          Right: Normal  No swelling, bleeding, inverted nipple, mass, nipple discharge, skin change or tenderness  Left: Normal  No swelling, bleeding, inverted nipple, mass, nipple discharge, skin change or tenderness  Abdominal:      General: There is no distension  Palpations: Abdomen is soft  There is no mass  Tenderness: There is no abdominal tenderness  There is no guarding or rebound  Hernia: No hernia is present  Lymphadenopathy:      Cervical: No cervical adenopathy  Upper Body:      Right upper body: No supraclavicular, axillary or pectoral adenopathy  Left upper body: No supraclavicular, axillary or pectoral adenopathy  Lower Body: No right inguinal adenopathy  No left inguinal adenopathy  Neurological:      Mental Status: She is alert and oriented to person, place, and time  Skin:     General: Skin is warm and dry     Psychiatric:         Behavior: Behavior normal

## 2021-02-23 ENCOUNTER — ANNUAL EXAM (OUTPATIENT)
Dept: OBGYN CLINIC | Facility: CLINIC | Age: 51
End: 2021-02-23
Payer: COMMERCIAL

## 2021-02-23 VITALS
HEIGHT: 62 IN | SYSTOLIC BLOOD PRESSURE: 148 MMHG | DIASTOLIC BLOOD PRESSURE: 92 MMHG | WEIGHT: 126 LBS | BODY MASS INDEX: 23.19 KG/M2

## 2021-02-23 DIAGNOSIS — N91.2 AMENORRHEA: ICD-10-CM

## 2021-02-23 DIAGNOSIS — Z12.31 ENCOUNTER FOR SCREENING MAMMOGRAM FOR MALIGNANT NEOPLASM OF BREAST: ICD-10-CM

## 2021-02-23 DIAGNOSIS — N92.0 MENORRHAGIA WITH REGULAR CYCLE: ICD-10-CM

## 2021-02-23 DIAGNOSIS — Z01.419 GYNECOLOGIC EXAM NORMAL: Primary | ICD-10-CM

## 2021-02-23 PROCEDURE — G0124 SCREEN C/V THIN LAYER BY MD: HCPCS | Performed by: PATHOLOGY

## 2021-02-23 PROCEDURE — S0612 ANNUAL GYNECOLOGICAL EXAMINA: HCPCS | Performed by: PHYSICIAN ASSISTANT

## 2021-02-23 PROCEDURE — G0145 SCR C/V CYTO,THINLAYER,RESCR: HCPCS | Performed by: PATHOLOGY

## 2021-02-23 PROCEDURE — 87624 HPV HI-RISK TYP POOLED RSLT: CPT | Performed by: PHYSICIAN ASSISTANT

## 2021-02-23 RX ORDER — LEVONORGESTREL AND ETHINYL ESTRADIOL 100-20(84)
1 KIT ORAL DAILY
Qty: 91 EACH | Refills: 3 | Status: SHIPPED | OUTPATIENT
Start: 2021-02-23 | End: 2022-02-28

## 2021-02-23 NOTE — ASSESSMENT & PLAN NOTE
Pap guidelines reviewed  Pap with reflex done today secondary to no TZ seen in 2018  Reviewed recommendation to have [address] and LH drawn after being off OCP for 7 days to see if in menopausal range and if can discontinue OCP  Script given  Reviewed cervical polyp found on today's exam  Will return to office for removal    Reviewed mammogram guidelines, script given today  Reviewed coughing during sleeping, normal thyroid ultrasound in 7/2020  Recommend follow up with PCP may need sleep study     Return to office for cervical polyp removal  Breath sounds clear and equal bilaterally.

## 2021-03-01 LAB
LAB AP GYN PRIMARY INTERPRETATION: ABNORMAL
LAB AP LMP: ABNORMAL
Lab: ABNORMAL
PATH INTERP SPEC-IMP: ABNORMAL

## 2021-03-03 LAB
HPV HR 12 DNA CVX QL NAA+PROBE: NEGATIVE
HPV16 DNA CVX QL NAA+PROBE: NEGATIVE
HPV18 DNA CVX QL NAA+PROBE: NEGATIVE

## 2021-08-16 ENCOUNTER — APPOINTMENT (OUTPATIENT)
Dept: URGENT CARE | Facility: CLINIC | Age: 51
End: 2021-08-16

## 2021-08-16 DIAGNOSIS — E53.8 VITAMIN B12 DEFICIENCY: ICD-10-CM

## 2021-08-16 DIAGNOSIS — E78.5 HYPERLIPIDEMIA, UNSPECIFIED HYPERLIPIDEMIA TYPE: ICD-10-CM

## 2021-08-16 DIAGNOSIS — I10 HYPERTENSION, UNSPECIFIED TYPE: ICD-10-CM

## 2021-08-16 DIAGNOSIS — E78.5 HYPERLIPIDEMIA, UNSPECIFIED HYPERLIPIDEMIA TYPE: Primary | ICD-10-CM

## 2021-08-16 DIAGNOSIS — Z79.899 HIGH RISK MEDICATION USE: ICD-10-CM

## 2021-08-16 DIAGNOSIS — E55.9 VITAMIN D DEFICIENCY: Primary | ICD-10-CM

## 2021-08-16 DIAGNOSIS — N91.2 AMENORRHEA: ICD-10-CM

## 2021-08-16 DIAGNOSIS — E03.9 HYPOTHYROIDISM, UNSPECIFIED TYPE: ICD-10-CM

## 2021-08-16 DIAGNOSIS — R23.8 EASY BRUISING: ICD-10-CM

## 2021-08-16 PROBLEM — R23.3 EASY BRUISING: Status: ACTIVE | Noted: 2021-08-16

## 2021-08-16 LAB
25(OH)D3 SERPL-MCNC: 62.2 NG/ML (ref 30–100)
ALBUMIN SERPL BCP-MCNC: 3.7 G/DL (ref 3.5–5)
ALP SERPL-CCNC: 61 U/L (ref 46–116)
ALT SERPL W P-5'-P-CCNC: 24 U/L (ref 12–78)
ANION GAP SERPL CALCULATED.3IONS-SCNC: 3 MMOL/L (ref 4–13)
APTT PPP: 31 SECONDS (ref 23–37)
AST SERPL W P-5'-P-CCNC: 26 U/L (ref 5–45)
BACTERIA UR QL AUTO: ABNORMAL /HPF
BASOPHILS # BLD AUTO: 0.04 THOUSANDS/ΜL (ref 0–0.1)
BASOPHILS NFR BLD AUTO: 1 % (ref 0–1)
BILIRUB SERPL-MCNC: 0.69 MG/DL (ref 0.2–1)
BILIRUB UR QL STRIP: NEGATIVE
BUN SERPL-MCNC: 9 MG/DL (ref 5–25)
CALCIUM SERPL-MCNC: 9.4 MG/DL (ref 8.3–10.1)
CHLORIDE SERPL-SCNC: 107 MMOL/L (ref 100–108)
CHOLEST SERPL-MCNC: 229 MG/DL (ref 50–200)
CLARITY UR: CLEAR
CO2 SERPL-SCNC: 28 MMOL/L (ref 21–32)
COLOR UR: ABNORMAL
CREAT SERPL-MCNC: 0.81 MG/DL (ref 0.6–1.3)
EOSINOPHIL # BLD AUTO: 0.04 THOUSAND/ΜL (ref 0–0.61)
EOSINOPHIL NFR BLD AUTO: 1 % (ref 0–6)
ERYTHROCYTE [DISTWIDTH] IN BLOOD BY AUTOMATED COUNT: 12.7 % (ref 11.6–15.1)
FSH SERPL-ACNC: 14.6 MIU/ML
GFR SERPL CREATININE-BSD FRML MDRD: 85 ML/MIN/1.73SQ M
GLUCOSE P FAST SERPL-MCNC: 80 MG/DL (ref 65–99)
GLUCOSE UR STRIP-MCNC: NEGATIVE MG/DL
HCT VFR BLD AUTO: 43.4 % (ref 34.8–46.1)
HDLC SERPL-MCNC: 76 MG/DL
HGB BLD-MCNC: 14.1 G/DL (ref 11.5–15.4)
HGB UR QL STRIP.AUTO: NEGATIVE
HYALINE CASTS #/AREA URNS LPF: ABNORMAL /LPF
IMM GRANULOCYTES # BLD AUTO: 0 THOUSAND/UL (ref 0–0.2)
IMM GRANULOCYTES NFR BLD AUTO: 0 % (ref 0–2)
INR PPP: 0.92 (ref 0.84–1.19)
KETONES UR STRIP-MCNC: NEGATIVE MG/DL
LDLC SERPL CALC-MCNC: 139 MG/DL (ref 0–100)
LEUKOCYTE ESTERASE UR QL STRIP: NEGATIVE
LH SERPL-ACNC: 7.6 MIU/ML
LYMPHOCYTES # BLD AUTO: 1.04 THOUSANDS/ΜL (ref 0.6–4.47)
LYMPHOCYTES NFR BLD AUTO: 26 % (ref 14–44)
MCH RBC QN AUTO: 31.8 PG (ref 26.8–34.3)
MCHC RBC AUTO-ENTMCNC: 32.5 G/DL (ref 31.4–37.4)
MCV RBC AUTO: 98 FL (ref 82–98)
MONOCYTES # BLD AUTO: 0.49 THOUSAND/ΜL (ref 0.17–1.22)
MONOCYTES NFR BLD AUTO: 12 % (ref 4–12)
NEUTROPHILS # BLD AUTO: 2.41 THOUSANDS/ΜL (ref 1.85–7.62)
NEUTS SEG NFR BLD AUTO: 60 % (ref 43–75)
NITRITE UR QL STRIP: NEGATIVE
NON-SQ EPI CELLS URNS QL MICRO: ABNORMAL /HPF
NRBC BLD AUTO-RTO: 0 /100 WBCS
PH UR STRIP.AUTO: 5.5 [PH]
PLATELET # BLD AUTO: 345 THOUSANDS/UL (ref 149–390)
PMV BLD AUTO: 10.1 FL (ref 8.9–12.7)
POTASSIUM SERPL-SCNC: 4.2 MMOL/L (ref 3.5–5.3)
PROT SERPL-MCNC: 7.4 G/DL (ref 6.4–8.2)
PROT UR STRIP-MCNC: NEGATIVE MG/DL
PROTHROMBIN TIME: 12.3 SECONDS (ref 11.6–14.5)
PROTHROMBIN TIME: 12.9 SECONDS (ref 11.6–14.5)
RBC # BLD AUTO: 4.43 MILLION/UL (ref 3.81–5.12)
RBC #/AREA URNS AUTO: ABNORMAL /HPF
SODIUM SERPL-SCNC: 138 MMOL/L (ref 136–145)
SP GR UR STRIP.AUTO: 1.02 (ref 1–1.03)
T4 FREE SERPL-MCNC: 0.99 NG/DL (ref 0.76–1.46)
TRIGL SERPL-MCNC: 69 MG/DL
TSH SERPL DL<=0.05 MIU/L-ACNC: 5.43 UIU/ML (ref 0.36–3.74)
UROBILINOGEN UR QL STRIP.AUTO: 0.2 E.U./DL
VIT B12 SERPL-MCNC: 169 PG/ML (ref 100–900)
WBC # BLD AUTO: 4.02 THOUSAND/UL (ref 4.31–10.16)
WBC #/AREA URNS AUTO: ABNORMAL /HPF

## 2021-08-16 PROCEDURE — 80061 LIPID PANEL: CPT

## 2021-08-16 PROCEDURE — 84439 ASSAY OF FREE THYROXINE: CPT

## 2021-08-16 PROCEDURE — 85730 THROMBOPLASTIN TIME PARTIAL: CPT

## 2021-08-16 PROCEDURE — 82306 VITAMIN D 25 HYDROXY: CPT

## 2021-08-16 PROCEDURE — 82607 VITAMIN B-12: CPT

## 2021-08-16 PROCEDURE — 83002 ASSAY OF GONADOTROPIN (LH): CPT

## 2021-08-16 PROCEDURE — 85025 COMPLETE CBC W/AUTO DIFF WBC: CPT

## 2021-08-16 PROCEDURE — 83001 ASSAY OF GONADOTROPIN (FSH): CPT

## 2021-08-16 PROCEDURE — 84443 ASSAY THYROID STIM HORMONE: CPT

## 2021-08-16 PROCEDURE — 81001 URINALYSIS AUTO W/SCOPE: CPT

## 2021-08-16 PROCEDURE — 80053 COMPREHEN METABOLIC PANEL: CPT

## 2021-08-16 PROCEDURE — 85610 PROTHROMBIN TIME: CPT | Performed by: NURSE PRACTITIONER

## 2021-08-16 NOTE — PROGRESS NOTES
Pt has labwork ordered by her PCP and GYN  She has been fasting  Labwork collected and sent to the lab

## 2021-10-18 ENCOUNTER — OFFICE VISIT (OUTPATIENT)
Dept: URGENT CARE | Facility: CLINIC | Age: 51
End: 2021-10-18

## 2021-10-18 DIAGNOSIS — M65.4 DE QUERVAIN'S TENOSYNOVITIS, BILATERAL: Primary | ICD-10-CM

## 2021-11-01 ENCOUNTER — OFFICE VISIT (OUTPATIENT)
Dept: URGENT CARE | Facility: CLINIC | Age: 51
End: 2021-11-01

## 2021-11-01 DIAGNOSIS — S99.922A TOE INJURY, LEFT, INITIAL ENCOUNTER: Primary | ICD-10-CM

## 2021-11-01 RX ORDER — MELOXICAM 7.5 MG/1
TABLET ORAL
COMMUNITY
Start: 2021-09-20 | End: 2022-04-06

## 2021-11-04 ENCOUNTER — APPOINTMENT (OUTPATIENT)
Dept: URGENT CARE | Facility: CLINIC | Age: 51
End: 2021-11-04

## 2021-11-04 DIAGNOSIS — E03.9 HYPOTHYROIDISM, UNSPECIFIED TYPE: Primary | ICD-10-CM

## 2021-11-04 LAB
T4 FREE SERPL-MCNC: 1.13 NG/DL (ref 0.76–1.46)
TSH SERPL DL<=0.05 MIU/L-ACNC: 1.03 UIU/ML (ref 0.36–3.74)

## 2021-11-04 PROCEDURE — 84439 ASSAY OF FREE THYROXINE: CPT

## 2021-11-04 PROCEDURE — 84443 ASSAY THYROID STIM HORMONE: CPT

## 2021-12-21 ENCOUNTER — OFFICE VISIT (OUTPATIENT)
Dept: URGENT CARE | Facility: CLINIC | Age: 51
End: 2021-12-21

## 2021-12-21 DIAGNOSIS — Z71.89 HEARING AID CONSULTATION: Primary | ICD-10-CM

## 2022-01-10 ENCOUNTER — OFFICE VISIT (OUTPATIENT)
Dept: URGENT CARE | Facility: CLINIC | Age: 52
End: 2022-01-10

## 2022-01-10 VITALS
DIASTOLIC BLOOD PRESSURE: 70 MMHG | RESPIRATION RATE: 16 BRPM | SYSTOLIC BLOOD PRESSURE: 120 MMHG | TEMPERATURE: 99 F | OXYGEN SATURATION: 98 % | HEART RATE: 72 BPM

## 2022-01-10 DIAGNOSIS — H65.01 NON-RECURRENT ACUTE SEROUS OTITIS MEDIA OF RIGHT EAR: Primary | ICD-10-CM

## 2022-01-10 LAB — S PYO AG THROAT QL: NEGATIVE

## 2022-01-10 RX ORDER — AMOXICILLIN 875 MG/1
875 TABLET, COATED ORAL 2 TIMES DAILY
Qty: 20 TABLET | Refills: 0 | Status: SHIPPED | OUTPATIENT
Start: 2022-01-10 | End: 2022-01-20

## 2022-01-10 NOTE — PATIENT INSTRUCTIONS
Start antibiotic in 2 days if symptoms worsen or persist     Recommend tylenol or motrin as needed for pain  Increase water intake  Follow up with your PCP or return to the clinic if symptoms worsen or persist more than 5-7 days  Ear Infection   AMBULATORY CARE:   An ear infection  is also called otitis media  Blocked or swollen eustachian tubes can cause an infection  Eustachian tubes connect the middle ear to the back of the nose and throat  They drain fluid from the middle ear  You may have a buildup of fluid in your ear  Germs build up in the fluid and infection develops  Common signs and symptoms:   · Ear pain    · Fever or a headache    · Trouble hearing    · Ringing or buzzing in your ear    · Plugged ear or an ear that feels full    · Dizziness    · Nausea or vomiting    Seek care immediately if:   · You have clear fluid coming from your ear  · You have a stiff neck, headache, and a fever  Call your doctor if:   · You see blood or pus draining from your ear  · Your ear pain gets worse or does not go away, even after treatment  · The outside of your ear is red or swollen  · You are vomiting or have diarrhea  · You have questions or concerns about your condition or care  Medicines: You may  need any of the following:  · Acetaminophen  decreases pain and fever  It is available without a doctor's order  Ask how much to take and how often to take it  Follow directions  Read the labels of all other medicines you are using to see if they also contain acetaminophen, or ask your doctor or pharmacist  Acetaminophen can cause liver damage if not taken correctly  Do not use more than 4 grams (4,000 milligrams) total of acetaminophen in one day  · NSAIDs , such as ibuprofen, help decrease swelling, pain, and fever  This medicine is available with or without a doctor's order  NSAIDs can cause stomach bleeding or kidney problems in certain people   If you take blood thinner medicine, always ask your healthcare provider if NSAIDs are safe for you  Always read the medicine label and follow directions  · Ear drops  may contain medicine to decrease pain and inflammation  · Antibiotics  help treat a bacterial infection  Self-care:   · Apply heat  on your ear for 15 to 20 minutes, 3 to 4 times a day or as directed  You can apply heat with an electric heating pad, hot water bottle, or warm compress  Always put a cloth between your skin and the heat pack to prevent burns  Heat helps decrease pain  · Apply ice  on your ear for 15 to 20 minutes, 3 to 4 times a day for 2 days or as directed  Use an ice pack, or put crushed ice in a plastic bag  Cover it with a towel before you apply it to your ear  Ice decreases swelling and pain  Prevent an ear infection:   · Wash your hands often  to help prevent the spread of germs  Ask everyone in your house to wash their hands with soap and water  Ask them to wash after they use the bathroom or change a diaper  Remind them to wash before they prepare or eat food  · Stay away from people who are ill  Some germs spread easily and quickly through contact  Follow up with your doctor as directed:  Write down your questions so you remember to ask them during your visits  © Copyright [employer] 2021 Information is for End User's use only and may not be sold, redistributed or otherwise used for commercial purposes  All illustrations and images included in CareNotes® are the copyrighted property of [employer]  or [employer]  The above information is an  only  It is not intended as medical advice for individual conditions or treatments  Talk to your doctor, nurse or pharmacist before following any medical regimen to see if it is safe and effective for you

## 2022-01-10 NOTE — ASSESSMENT & PLAN NOTE
History and exam findings consistent with AOM  Rapid strep test negative  At home COVID test negative x 2 tests several days apart  Symptoms started today, recommend waiting 48 hours to start antibiotic if symptoms persist or worsen  symptomatic care also recommended  Reasons to follow up reviewed with pt

## 2022-01-10 NOTE — PROGRESS NOTES
[facility]        NAME: Amee Szymanski is a 49 y o  female  : 1970    MRN: [id number]  DATE: January 10, 2022  TIME: 1:06 PM    Assessment and Plan   Non-recurrent acute serous otitis media of right ear [H65 01]  1  Non-recurrent acute serous otitis media of right ear  POCT rapid strepA    amoxicillin (AMOXIL) 875 mg tablet         Patient Instructions       Start antibiotic in 2 days if symptoms worsen or persist     Recommend tylenol or motrin as needed for pain  Increase water intake  Follow up with your PCP or return to the clinic if symptoms worsen or persist more than 5-7 days  Proceed to  ER if symptoms worsen  Chief Complaint     Chief Complaint   Patient presents with    Earache     Right          History of Present Illness       R ear pain & R throat pain x 1 day  She has a hx tinnitus in her R ear  Ear feels clogged and painful  Mild nasal congestion- not ususual for her  No fever or chills  She took 2 at home COVID tests recently- both were negative  She did not take any medication for this yet  Review of Systems   Review of Systems   Constitutional: Negative  Negative for chills and fever  HENT: Positive for congestion, ear pain (right), sore throat (right side) and tinnitus (right)  Negative for ear discharge, postnasal drip, rhinorrhea, sinus pressure and sinus pain  Eyes: Negative  Respiratory: Negative  Negative for cough, shortness of breath and wheezing  Cardiovascular: Negative  Negative for chest pain and palpitations  Musculoskeletal: Negative  Negative for myalgias  Skin: Negative  Allergic/Immunologic: Negative  All other systems reviewed and are negative          Current Medications       Current Outpatient Medications:     ALPRAZolam (XANAX) 0 25 mg tablet, Daily, Disp: , Rfl:     Levonorgest-Eth Estrad 91-Day (Camrese Lo) 0 1-0 02 & 0 01 MG TABS, Take 1 tablet by mouth daily, Disp: 91 each, Rfl: 3    levothyroxine 50 mcg tablet, , Disp: , Rfl:     Multiple Vitamins-Minerals (VITAMIN D3 COMPLETE PO), Daily, Disp: , Rfl:     venlafaxine (EFFEXOR-XR) 37 5 mg 24 hr capsule, , Disp: , Rfl:     amoxicillin (AMOXIL) 875 mg tablet, Take 1 tablet (875 mg total) by mouth 2 (two) times a day for 10 days, Disp: 20 tablet, Rfl: 0    influenza vaccine, quadrivalent (Fluarix Quadrivalent) 0 5 ML MAYANK, Fluarix Quad [id number] (PF) 60 mcg (15 mcg x 4)/0 5 mL IM syringe (Patient not taking: Reported on 1/10/2022), Disp: , Rfl:     meloxicam (MOBIC) 7 5 mg tablet, , Disp: , Rfl:     Current Allergies     Allergies as of 01/10/2022 - Reviewed 01/10/2022   Allergen Reaction Noted    No active allergies      Sertraline Hives 02/22/2021            The following portions of the patient's history were reviewed and updated as appropriate: allergies, current medications, past family history, past medical history, past social history, past surgical history and problem list      Past Medical History:   Diagnosis Date    Allergic rhinitis     Cataracts, bilateral     HL (hearing loss)     Liver hemangioma     Tinnitus        Past Surgical History:   Procedure Laterality Date    KNEE SURGERY Left 12/2006    RECTAL PROLAPSE REPAIR      SMALL INTESTINE SURGERY  2004       Family History   Problem Relation Age of Onset    Hypertension Mother     Hyperlipidemia Mother     Heart attack Father     Hypertension Father     Hyperlipidemia Father     Stroke Father     Migraines Sister     Migraines Brother     Breast cancer Maternal Grandmother 55         Medications have been verified  Objective   /70 (BP Location: Right arm, Patient Position: Sitting, Cuff Size: Adult)   Pulse 72   Temp 99 °F (37 2 °C) (Tympanic)   Resp 16   SpO2 98%   No LMP recorded  Patient is perimenopausal        Physical Exam     Physical Exam  Vitals reviewed  Constitutional:       Appearance: Normal appearance     HENT:      Head: Normocephalic and atraumatic  Jaw: There is normal jaw occlusion  Right Ear: Ear canal and external ear normal  Decreased hearing noted  Tenderness present  A middle ear effusion is present  There is no impacted cerumen  No mastoid tenderness  Tympanic membrane is bulging  Tympanic membrane is not erythematous  Left Ear: Hearing, tympanic membrane, ear canal and external ear normal  No mastoid tenderness  Nose: Nose normal       Mouth/Throat:      Mouth: Mucous membranes are moist    Eyes:      Extraocular Movements: Extraocular movements intact  Conjunctiva/sclera: Conjunctivae normal       Pupils: Pupils are equal, round, and reactive to light  Cardiovascular:      Rate and Rhythm: Normal rate and regular rhythm  Pulses: Normal pulses  Heart sounds: Normal heart sounds  Pulmonary:      Effort: Pulmonary effort is normal       Breath sounds: Normal breath sounds  Musculoskeletal:         General: Normal range of motion  Skin:     General: Skin is warm and dry  Capillary Refill: Capillary refill takes less than 2 seconds  Neurological:      General: No focal deficit present  Mental Status: She is alert     Psychiatric:         Mood and Affect: Mood normal

## 2022-04-06 ENCOUNTER — ANNUAL EXAM (OUTPATIENT)
Dept: OBGYN CLINIC | Facility: CLINIC | Age: 52
End: 2022-04-06
Payer: COMMERCIAL

## 2022-04-06 VITALS
HEIGHT: 63 IN | DIASTOLIC BLOOD PRESSURE: 80 MMHG | SYSTOLIC BLOOD PRESSURE: 138 MMHG | WEIGHT: 136 LBS | BODY MASS INDEX: 24.1 KG/M2

## 2022-04-06 DIAGNOSIS — R87.610 ASCUS OF CERVIX WITH NEGATIVE HIGH RISK HPV: ICD-10-CM

## 2022-04-06 DIAGNOSIS — N92.0 MENORRHAGIA WITH REGULAR CYCLE: ICD-10-CM

## 2022-04-06 DIAGNOSIS — Z12.31 ENCOUNTER FOR SCREENING MAMMOGRAM FOR MALIGNANT NEOPLASM OF BREAST: ICD-10-CM

## 2022-04-06 DIAGNOSIS — Z01.419 ROUTINE GYNECOLOGICAL EXAMINATION: Primary | ICD-10-CM

## 2022-04-06 PROBLEM — J30.1 ALLERGIC RHINITIS DUE TO POLLEN: Status: ACTIVE | Noted: 2022-02-07

## 2022-04-06 PROCEDURE — G0476 HPV COMBO ASSAY CA SCREEN: HCPCS | Performed by: PHYSICIAN ASSISTANT

## 2022-04-06 PROCEDURE — G0143 SCR C/V CYTO,THINLAYER,RESCR: HCPCS | Performed by: PHYSICIAN ASSISTANT

## 2022-04-06 PROCEDURE — S0612 ANNUAL GYNECOLOGICAL EXAMINA: HCPCS | Performed by: PHYSICIAN ASSISTANT

## 2022-04-06 RX ORDER — ONDANSETRON 4 MG/1
TABLET, ORALLY DISINTEGRATING ORAL
COMMUNITY

## 2022-04-06 RX ORDER — LEVOTHYROXINE SODIUM 0.07 MG/1
TABLET ORAL
COMMUNITY

## 2022-04-06 RX ORDER — CYCLOBENZAPRINE HCL 5 MG
TABLET ORAL
COMMUNITY
End: 2022-04-06

## 2022-04-06 RX ORDER — VENLAFAXINE HYDROCHLORIDE 75 MG/1
CAPSULE, EXTENDED RELEASE ORAL
COMMUNITY

## 2022-04-06 RX ORDER — MELOXICAM 7.5 MG/1
TABLET ORAL
COMMUNITY
End: 2022-04-06

## 2022-04-06 RX ORDER — LEVONORGESTREL AND ETHINYL ESTRADIOL 100-20(84)
1 KIT ORAL DAILY
Qty: 91 TABLET | Refills: 3 | Status: SHIPPED | OUTPATIENT
Start: 2022-04-06 | End: 2022-08-01

## 2022-04-06 NOTE — LETTER
April 6, 2022     [address]  [address] [facility]  [address]    Patient: Christo Watt   YOB: 1970   Date of Visit: 4/6/2022       Dear Dr Felipe Stevens:    Thank you for referring Milvia Minors to me for evaluation  Below are my notes for this consultation  If you have questions, please do not hesitate to call me  I look forward to following your patient along with you  Sincerely,        Ai Shah PA-C        CC: No Recipients  Ai Shah PA-C  4/6/2022  6:03 PM  Sign when Signing Visit  Assessment/Plan   Problem List Items Addressed This Visit        Other    Routine gynecological examination - Primary     Pap guidelines reviewed  Pap with HPV done today  Reviewed mammogram guidelines, script given  Reviewed concern about elevated BP while on combined OCP at her age  BP initially 160/98  On repeat was 138/80  Patient reports having a very bad stressful day today  Sees her PCP regularly almost monthly with how often she has to take her parents to the doctor and has her BP taken regularly by PCP which has been normal per patient  Reviewed consideration of stopping OCP and monitoring to see if menses return vs switching to a progesterone only pill safer for high blood pressure  Patient will consider but would like to continue this pill for now  Aware of increased risk of blood clots, stroke, heart attack while taking combined OCP  Has appointment with PCP next month and will also bring it up to him  Return to office for annual or as needed            Relevant Orders    Liquid-based pap, screening      Other Visit Diagnoses     ASCUS of cervix with negative high risk HPV        Relevant Orders    Liquid-based pap, screening    Encounter for screening mammogram for malignant neoplasm of breast        Relevant Orders    Mammo screening bilateral w 3d & cad    Menorrhagia with regular cycle        Relevant Medications    Levonorgest-Eth Estrad 91-Day (Camrese Lo) 0 1-0 02 & 0 01 MG TABS          Subjective:     Patient ID: Cindy Alatorre is a 47 y o  y o  female  HPI  46 yo seen for annual exam  Currently on Camrese Lo OCP  Had [facility] and LH done 8/2021 which were not yet in menopausal range  Has not gotten a menses for the past year on this pill  Denies bowel or bladder issues  Last pap: 2/23/2021 ASCUS (-)HRHPV  Last mammogram: 5/14/2019 BIRADS 1: Negative  Last colonoscopy: Had done by Dr Tabby Casillas about 10 years ago, reports is due  Planning to schedule  The following portions of the patient's history were reviewed and updated as appropriate:   She  has a past medical history of Allergic rhinitis, Cataracts, bilateral, HL (hearing loss), Liver hemangioma, and Tinnitus  She   Patient Active Problem List    Diagnosis Date Noted    Routine gynecological examination 04/06/2022    Allergic rhinitis due to pollen 02/07/2022    Non-recurrent acute serous otitis media of right ear 01/10/2022    Hearing aid consultation 12/21/2021    Toe injury, left, initial encounter 11/01/2021    De Quervain's tenosynovitis, bilateral 10/18/2021    Hypertension 08/16/2021    Hyperlipidemia 08/16/2021    Hypothyroidism 08/16/2021    Vitamin B12 deficiency 08/16/2021    Easy bruising 08/16/2021    Elevated blood-pressure reading without diagnosis of hypertension 10/20/2020    Idiopathic atrophic hypothyroidism 07/09/2020    Screening for depression 07/09/2020    Vitamin D deficiency 02/17/2020    Abnormal finding on thyroid function test 02/17/2020    Gynecologic exam normal 10/24/2019    Vertigo 09/18/2019    Cough 05/22/2019    Tonsillolith 05/22/2019    Environmental allergies 05/20/2019    Tinnitus 05/20/2019    Generalized anxiety disorder 04/08/2019     She  has a past surgical history that includes Small intestine surgery (2004); Rectal prolapse repair; and Knee surgery (Left, 12/2006)    Her family history includes Breast cancer (age of onset: 54) in her maternal grandmother; Heart attack in her father; Hyperlipidemia in her father and mother; Hypertension in her father and mother; Migraines in her brother and sister; Skin cancer in her father; Stroke in her father  She  reports that she quit smoking about 28 years ago  Her smoking use included cigarettes  She smoked 0 25 packs per day  She has never used smokeless tobacco  She reports that she does not drink alcohol and does not use drugs  Current Outpatient Medications   Medication Sig Dispense Refill    ALPRAZolam (XANAX) 0 25 mg tablet Daily      Cholecalciferol 125 MCG (5000 UT) capsule Daily      Cyanocobalamin (VITAMIN B-12 PO) Take by mouth      Levonorgest-Eth Estrad 91-Day (Camrese Lo) 0 1-0 02 & 0 01 MG TABS Take 1 tablet by mouth daily 91 tablet 3    levothyroxine 75 mcg tablet levothyroxine 75 mcg tablet      Multiple Vitamins-Minerals (VITAMIN D3 COMPLETE PO) Daily      ondansetron (ZOFRAN-ODT) 4 mg disintegrating tablet ondansetron 4 mg disintegrating tablet      venlafaxine (EFFEXOR-XR) 75 mg 24 hr capsule venlafaxine ER 75 mg capsule,extended release 24 hr   TAKE ONE CAPSULE BY MOUTH EVERY DAY       No current facility-administered medications for this visit  She is allergic to no active allergies and sertraline       Menstrual History:  OB History        3    Para   3    Term   3            AB        Living   3       SAB        IAB        Ectopic        Multiple        Live Births                      No LMP recorded (lmp unknown)  Patient is perimenopausal          Review of Systems   Constitutional: Negative for fatigue, fever and unexpected weight change  HENT: Negative for dental problem and sinus pressure  Eyes: Negative for visual disturbance  Respiratory: Negative for cough, shortness of breath and wheezing  Cardiovascular: Negative for chest pain     Gastrointestinal: Negative for abdominal pain, blood in stool, constipation, diarrhea, nausea and vomiting  Endocrine: Negative for polydipsia  Genitourinary: Negative for difficulty urinating, dyspareunia, dysuria, frequency, hematuria, pelvic pain and urgency  Musculoskeletal: Negative for arthralgias and back pain  Neurological: Negative for dizziness, seizures, light-headedness and headaches  Psychiatric/Behavioral: Negative for suicidal ideas  The patient is not nervous/anxious  Objective:  Vitals:    04/06/22 1555   BP: 138/80   BP Location: Left arm   Patient Position: Sitting   Cuff Size: Standard   Weight: 61 7 kg (136 lb)   Height: 5' 3" (1 6 m)      Physical Exam  Constitutional:       Appearance: Normal appearance  She is well-developed  Genitourinary:      Vulva and bladder normal       No lesions in the vagina  Right Labia: No rash, tenderness, lesions or skin changes  Left Labia: No tenderness, lesions, skin changes or rash  No labial fusion noted  No inguinal adenopathy present in the right or left side  No vaginal discharge, erythema, tenderness or bleeding  Right Adnexa: not tender, not full and no mass present  Left Adnexa: not tender, not full and no mass present  No cervical motion tenderness, discharge or lesion  Uterus is not enlarged, tender or irregular  No uterine mass detected  No urethral prolapse, tenderness or mass present  Bladder is not tender  Breasts: Breasts are symmetrical       Right: No swelling, bleeding, inverted nipple, mass, nipple discharge, skin change, tenderness, axillary adenopathy or supraclavicular adenopathy  Left: No swelling, bleeding, inverted nipple, mass, nipple discharge, skin change, tenderness, axillary adenopathy or supraclavicular adenopathy  HENT:      Head: Normocephalic and atraumatic  Neck:      Thyroid: No thyromegaly  Cardiovascular:      Rate and Rhythm: Normal rate and regular rhythm  Heart sounds: Normal heart sounds   No murmur heard   No friction rub  No gallop  Pulmonary:      Effort: Pulmonary effort is normal  No respiratory distress  Breath sounds: Normal breath sounds  No wheezing  Abdominal:      General: There is no distension  Palpations: Abdomen is soft  There is no mass  Tenderness: There is no abdominal tenderness  There is no guarding or rebound  Hernia: No hernia is present  Lymphadenopathy:      Cervical: No cervical adenopathy  Upper Body:      Right upper body: No supraclavicular, axillary or pectoral adenopathy  Left upper body: No supraclavicular, axillary or pectoral adenopathy  Lower Body: No right inguinal adenopathy  No left inguinal adenopathy  Neurological:      Mental Status: She is alert and oriented to person, place, and time  Skin:     General: Skin is warm and dry     Psychiatric:         Behavior: Behavior normal

## 2022-04-06 NOTE — PROGRESS NOTES
Assessment/Plan   Problem List Items Addressed This Visit        Other    Routine gynecological examination - Primary     Pap guidelines reviewed  Pap with HPV done today  Reviewed mammogram guidelines, script given  Reviewed concern about elevated BP while on combined OCP at her age  BP initially 160/98  On repeat was 138/80  Patient reports having a very bad stressful day today  Sees her PCP regularly almost monthly with how often she has to take her parents to the doctor and has her BP taken regularly by PCP which has been normal per patient  Reviewed consideration of stopping OCP and monitoring to see if menses return vs switching to a progesterone only pill safer for high blood pressure  Patient will consider but would like to continue this pill for now  Aware of increased risk of blood clots, stroke, heart attack while taking combined OCP  Has appointment with PCP next month and will also bring it up to him  Return to office for annual or as needed  Relevant Orders    Liquid-based pap, screening      Other Visit Diagnoses     ASCUS of cervix with negative high risk HPV        Relevant Orders    Liquid-based pap, screening    Encounter for screening mammogram for malignant neoplasm of breast        Relevant Orders    Mammo screening bilateral w 3d & cad    Menorrhagia with regular cycle        Relevant Medications    Levonorgest-Eth Estrad 91-Day (Camrese Lo) 0 1-0 02 & 0 01 MG TABS          Subjective:     Patient ID: Saba Jarrell is a 47 y o  y o  female  HPI  46 yo seen for annual exam  Currently on Camrese Lo OCP  Had [facility] and LH done 8/2021 which were not yet in menopausal range  Has not gotten a menses for the past year on this pill  Denies bowel or bladder issues  Last pap: 2/23/2021 ASCUS (-)HRHPV  Last mammogram: 5/14/2019 BIRADS 1: Negative  Last colonoscopy: Had done by Dr Rajesh Armenta about 10 years ago, reports is due  Planning to schedule     The following portions of the patient's history were reviewed and updated as appropriate:   She  has a past medical history of Allergic rhinitis, Cataracts, bilateral, HL (hearing loss), Liver hemangioma, and Tinnitus  She   Patient Active Problem List    Diagnosis Date Noted    Routine gynecological examination 04/06/2022    Allergic rhinitis due to pollen 02/07/2022    Non-recurrent acute serous otitis media of right ear 01/10/2022    Hearing aid consultation 12/21/2021    Toe injury, left, initial encounter 11/01/2021    De Quervain's tenosynovitis, bilateral 10/18/2021    Hypertension 08/16/2021    Hyperlipidemia 08/16/2021    Hypothyroidism 08/16/2021    Vitamin B12 deficiency 08/16/2021    Easy bruising 08/16/2021    Elevated blood-pressure reading without diagnosis of hypertension 10/20/2020    Idiopathic atrophic hypothyroidism 07/09/2020    Screening for depression 07/09/2020    Vitamin D deficiency 02/17/2020    Abnormal finding on thyroid function test 02/17/2020    Gynecologic exam normal 10/24/2019    Vertigo 09/18/2019    Cough 05/22/2019    Tonsillolith 05/22/2019    Environmental allergies 05/20/2019    Tinnitus 05/20/2019    Generalized anxiety disorder 04/08/2019     She  has a past surgical history that includes Small intestine surgery (2004); Rectal prolapse repair; and Knee surgery (Left, 12/2006)  Her family history includes Breast cancer (age of onset: 54) in her maternal grandmother; Heart attack in her father; Hyperlipidemia in her father and mother; Hypertension in her father and mother; Migraines in her brother and sister; Skin cancer in her father; Stroke in her father  She  reports that she quit smoking about 28 years ago  Her smoking use included cigarettes  She smoked 0 25 packs per day  She has never used smokeless tobacco  She reports that she does not drink alcohol and does not use drugs    Current Outpatient Medications   Medication Sig Dispense Refill    ALPRAZolam (XANAX) 0 25 mg tablet Daily      Cholecalciferol 125 MCG (5000 UT) capsule Daily      Cyanocobalamin (VITAMIN B-12 PO) Take by mouth      Levonorgest-Eth Estrad 91-Day (Camrese Lo) 0 1-0 02 & 0 01 MG TABS Take 1 tablet by mouth daily 91 tablet 3    levothyroxine 75 mcg tablet levothyroxine 75 mcg tablet      Multiple Vitamins-Minerals (VITAMIN D3 COMPLETE PO) Daily      ondansetron (ZOFRAN-ODT) 4 mg disintegrating tablet ondansetron 4 mg disintegrating tablet      venlafaxine (EFFEXOR-XR) 75 mg 24 hr capsule venlafaxine ER 75 mg capsule,extended release 24 hr   TAKE ONE CAPSULE BY MOUTH EVERY DAY       No current facility-administered medications for this visit  She is allergic to no active allergies and sertraline       Menstrual History:  OB History        3    Para   3    Term   3            AB        Living   3       SAB        IAB        Ectopic        Multiple        Live Births                      No LMP recorded (lmp unknown)  Patient is perimenopausal          Review of Systems   Constitutional: Negative for fatigue, fever and unexpected weight change  HENT: Negative for dental problem and sinus pressure  Eyes: Negative for visual disturbance  Respiratory: Negative for cough, shortness of breath and wheezing  Cardiovascular: Negative for chest pain  Gastrointestinal: Negative for abdominal pain, blood in stool, constipation, diarrhea, nausea and vomiting  Endocrine: Negative for polydipsia  Genitourinary: Negative for difficulty urinating, dyspareunia, dysuria, frequency, hematuria, pelvic pain and urgency  Musculoskeletal: Negative for arthralgias and back pain  Neurological: Negative for dizziness, seizures, light-headedness and headaches  Psychiatric/Behavioral: Negative for suicidal ideas  The patient is not nervous/anxious          Objective:  Vitals:    22 1555   BP: 138/80   BP Location: Left arm   Patient Position: Sitting   Cuff Size: Standard   Weight: 61 7 kg (136 lb)   Height: 5' 3" (1 6 m)      Physical Exam  Constitutional:       Appearance: Normal appearance  She is well-developed  Genitourinary:      Vulva and bladder normal       No lesions in the vagina  Right Labia: No rash, tenderness, lesions or skin changes  Left Labia: No tenderness, lesions, skin changes or rash  No labial fusion noted  No inguinal adenopathy present in the right or left side  No vaginal discharge, erythema, tenderness or bleeding  Right Adnexa: not tender, not full and no mass present  Left Adnexa: not tender, not full and no mass present  No cervical motion tenderness, discharge or lesion  Uterus is not enlarged, tender or irregular  No uterine mass detected  No urethral prolapse, tenderness or mass present  Bladder is not tender  Breasts: Breasts are symmetrical       Right: No swelling, bleeding, inverted nipple, mass, nipple discharge, skin change, tenderness, axillary adenopathy or supraclavicular adenopathy  Left: No swelling, bleeding, inverted nipple, mass, nipple discharge, skin change, tenderness, axillary adenopathy or supraclavicular adenopathy  HENT:      Head: Normocephalic and atraumatic  Neck:      Thyroid: No thyromegaly  Cardiovascular:      Rate and Rhythm: Normal rate and regular rhythm  Heart sounds: Normal heart sounds  No murmur heard  No friction rub  No gallop  Pulmonary:      Effort: Pulmonary effort is normal  No respiratory distress  Breath sounds: Normal breath sounds  No wheezing  Abdominal:      General: There is no distension  Palpations: Abdomen is soft  There is no mass  Tenderness: There is no abdominal tenderness  There is no guarding or rebound  Hernia: No hernia is present  Lymphadenopathy:      Cervical: No cervical adenopathy  Upper Body:      Right upper body: No supraclavicular, axillary or pectoral adenopathy        Left upper body: No supraclavicular, axillary or pectoral adenopathy  Lower Body: No right inguinal adenopathy  No left inguinal adenopathy  Neurological:      Mental Status: She is alert and oriented to person, place, and time  Skin:     General: Skin is warm and dry     Psychiatric:         Behavior: Behavior normal

## 2022-04-06 NOTE — ASSESSMENT & PLAN NOTE
Pap guidelines reviewed  Pap with HPV done today  Reviewed mammogram guidelines, script given  Reviewed concern about elevated BP while on combined OCP at her age  BP initially 160/98  On repeat was 138/80  Patient reports having a very bad stressful day today  Sees her PCP regularly almost monthly with how often she has to take her parents to the doctor and has her BP taken regularly by PCP which has been normal per patient  Reviewed consideration of stopping OCP and monitoring to see if menses return vs switching to a progesterone only pill safer for high blood pressure  Patient will consider but would like to continue this pill for now  Aware of increased risk of blood clots, stroke, heart attack while taking combined OCP  Has appointment with PCP next month and will also bring it up to him  Return to office for annual or as needed

## 2022-04-11 ENCOUNTER — APPOINTMENT (OUTPATIENT)
Dept: URGENT CARE | Facility: CLINIC | Age: 52
End: 2022-04-11

## 2022-04-11 VITALS
DIASTOLIC BLOOD PRESSURE: 88 MMHG | RESPIRATION RATE: 16 BRPM | OXYGEN SATURATION: 98 % | SYSTOLIC BLOOD PRESSURE: 150 MMHG | HEART RATE: 68 BPM

## 2022-04-11 DIAGNOSIS — I10 HYPERTENSION, UNSPECIFIED TYPE: Primary | ICD-10-CM

## 2022-04-11 NOTE — PROGRESS NOTES
Pt is requesting BP check  Her BP was elevated at her GYN well woman check last week and she was instructed to monitor her BP and follow up with her PCP  She has a PCP appointment tomorrow  Pt denies chest pain, headaches, vision changes, dizziness, or other evidence of end organ damage  BP high but improved  Pt will follow up with her PCP as instructed

## 2022-04-13 LAB
LAB AP GYN PRIMARY INTERPRETATION: NORMAL
Lab: NORMAL

## 2022-05-02 ENCOUNTER — APPOINTMENT (OUTPATIENT)
Dept: URGENT CARE | Facility: CLINIC | Age: 52
End: 2022-05-02

## 2022-05-02 VITALS — SYSTOLIC BLOOD PRESSURE: 150 MMHG | DIASTOLIC BLOOD PRESSURE: 84 MMHG

## 2022-05-02 DIAGNOSIS — I10 HYPERTENSION, UNSPECIFIED TYPE: Primary | ICD-10-CM

## 2022-05-02 RX ORDER — LOSARTAN POTASSIUM 50 MG/1
TABLET ORAL DAILY
COMMUNITY

## 2022-05-02 NOTE — PROGRESS NOTES
Pt is requesting BP check  Pt's PCP recently prescribed Losartan and would like to monitor her BP  She denies chest pain, dyspea, headache, vision changes, or other evidence of end organ damage  BP stable, starting to decrease  She will continue to monitor

## 2022-05-05 ENCOUNTER — APPOINTMENT (OUTPATIENT)
Dept: URGENT CARE | Facility: CLINIC | Age: 52
End: 2022-05-05

## 2022-05-05 DIAGNOSIS — E55.9 VITAMIN D DEFICIENCY: ICD-10-CM

## 2022-05-05 DIAGNOSIS — E78.5 HYPERLIPIDEMIA, UNSPECIFIED HYPERLIPIDEMIA TYPE: ICD-10-CM

## 2022-05-05 DIAGNOSIS — Z79.899 HIGH RISK MEDICATIONS (NOT ANTICOAGULANTS) LONG-TERM USE: ICD-10-CM

## 2022-05-05 DIAGNOSIS — E03.9 HYPOTHYROIDISM, UNSPECIFIED TYPE: Primary | ICD-10-CM

## 2022-05-05 DIAGNOSIS — Z11.59 ENCOUNTER FOR SCREENING FOR VIRAL DISEASE: ICD-10-CM

## 2022-05-05 DIAGNOSIS — Z00.00 ADULT GENERAL MEDICAL EXAMINATION: ICD-10-CM

## 2022-05-05 LAB
25(OH)D3 SERPL-MCNC: 63.2 NG/ML (ref 30–100)
ALBUMIN SERPL BCP-MCNC: 3.5 G/DL (ref 3.5–5)
ALP SERPL-CCNC: 71 U/L (ref 46–116)
ALT SERPL W P-5'-P-CCNC: 21 U/L (ref 12–78)
AMORPH URATE CRY URNS QL MICRO: ABNORMAL
ANION GAP SERPL CALCULATED.3IONS-SCNC: 6 MMOL/L (ref 4–13)
AST SERPL W P-5'-P-CCNC: 15 U/L (ref 5–45)
BACTERIA UR QL AUTO: ABNORMAL /HPF
BASOPHILS # BLD AUTO: 0.03 THOUSANDS/ΜL (ref 0–0.1)
BASOPHILS NFR BLD AUTO: 1 % (ref 0–1)
BILIRUB SERPL-MCNC: 0.54 MG/DL (ref 0.2–1)
BILIRUB UR QL STRIP: NEGATIVE
BUN SERPL-MCNC: 10 MG/DL (ref 5–25)
CALCIUM SERPL-MCNC: 9.6 MG/DL (ref 8.3–10.1)
CHLORIDE SERPL-SCNC: 106 MMOL/L (ref 100–108)
CHOLEST SERPL-MCNC: 222 MG/DL
CLARITY UR: ABNORMAL
CO2 SERPL-SCNC: 28 MMOL/L (ref 21–32)
COLOR UR: ABNORMAL
CREAT SERPL-MCNC: 0.84 MG/DL (ref 0.6–1.3)
EOSINOPHIL # BLD AUTO: 0.05 THOUSAND/ΜL (ref 0–0.61)
EOSINOPHIL NFR BLD AUTO: 1 % (ref 0–6)
ERYTHROCYTE [DISTWIDTH] IN BLOOD BY AUTOMATED COUNT: 12.3 % (ref 11.6–15.1)
GFR SERPL CREATININE-BSD FRML MDRD: 80 ML/MIN/1.73SQ M
GLUCOSE P FAST SERPL-MCNC: 81 MG/DL (ref 65–99)
GLUCOSE UR STRIP-MCNC: NEGATIVE MG/DL
HCT VFR BLD AUTO: 39.6 % (ref 34.8–46.1)
HDLC SERPL-MCNC: 60 MG/DL
HGB BLD-MCNC: 12.8 G/DL (ref 11.5–15.4)
HGB UR QL STRIP.AUTO: NEGATIVE
IMM GRANULOCYTES # BLD AUTO: 0.01 THOUSAND/UL (ref 0–0.2)
IMM GRANULOCYTES NFR BLD AUTO: 0 % (ref 0–2)
KETONES UR STRIP-MCNC: NEGATIVE MG/DL
LDLC SERPL CALC-MCNC: 139 MG/DL (ref 0–100)
LEUKOCYTE ESTERASE UR QL STRIP: ABNORMAL
LYMPHOCYTES # BLD AUTO: 1.16 THOUSANDS/ΜL (ref 0.6–4.47)
LYMPHOCYTES NFR BLD AUTO: 25 % (ref 14–44)
MCH RBC QN AUTO: 30.8 PG (ref 26.8–34.3)
MCHC RBC AUTO-ENTMCNC: 32.3 G/DL (ref 31.4–37.4)
MCV RBC AUTO: 95 FL (ref 82–98)
MONOCYTES # BLD AUTO: 0.47 THOUSAND/ΜL (ref 0.17–1.22)
MONOCYTES NFR BLD AUTO: 10 % (ref 4–12)
MUCOUS THREADS UR QL AUTO: ABNORMAL
NEUTROPHILS # BLD AUTO: 2.91 THOUSANDS/ΜL (ref 1.85–7.62)
NEUTS SEG NFR BLD AUTO: 63 % (ref 43–75)
NITRITE UR QL STRIP: NEGATIVE
NON-SQ EPI CELLS URNS QL MICRO: ABNORMAL /HPF
NRBC BLD AUTO-RTO: 0 /100 WBCS
PH UR STRIP.AUTO: 6 [PH]
PLATELET # BLD AUTO: 384 THOUSANDS/UL (ref 149–390)
PMV BLD AUTO: 10.4 FL (ref 8.9–12.7)
POTASSIUM SERPL-SCNC: 4 MMOL/L (ref 3.5–5.3)
PROT SERPL-MCNC: 7.3 G/DL (ref 6.4–8.2)
PROT UR STRIP-MCNC: ABNORMAL MG/DL
RBC # BLD AUTO: 4.16 MILLION/UL (ref 3.81–5.12)
RBC #/AREA URNS AUTO: ABNORMAL /HPF
SODIUM SERPL-SCNC: 140 MMOL/L (ref 136–145)
SP GR UR STRIP.AUTO: 1.02 (ref 1–1.03)
T4 FREE SERPL-MCNC: 1.04 NG/DL (ref 0.76–1.46)
TRIGL SERPL-MCNC: 116 MG/DL
TSH SERPL DL<=0.05 MIU/L-ACNC: 2.58 UIU/ML (ref 0.45–4.5)
UROBILINOGEN UR STRIP-ACNC: <2 MG/DL
WBC # BLD AUTO: 4.63 THOUSAND/UL (ref 4.31–10.16)
WBC #/AREA URNS AUTO: ABNORMAL /HPF

## 2022-05-05 PROCEDURE — 84439 ASSAY OF FREE THYROXINE: CPT

## 2022-05-05 PROCEDURE — 80053 COMPREHEN METABOLIC PANEL: CPT

## 2022-05-05 PROCEDURE — 84443 ASSAY THYROID STIM HORMONE: CPT

## 2022-05-05 PROCEDURE — 81001 URINALYSIS AUTO W/SCOPE: CPT

## 2022-05-05 PROCEDURE — 85025 COMPLETE CBC W/AUTO DIFF WBC: CPT

## 2022-05-05 PROCEDURE — 80061 LIPID PANEL: CPT

## 2022-05-05 PROCEDURE — 82306 VITAMIN D 25 HYDROXY: CPT

## 2022-05-19 ENCOUNTER — PREP FOR PROCEDURE (OUTPATIENT)
Dept: GASTROENTEROLOGY | Facility: CLINIC | Age: 52
End: 2022-05-19

## 2022-05-19 ENCOUNTER — TELEPHONE (OUTPATIENT)
Dept: GASTROENTEROLOGY | Facility: CLINIC | Age: 52
End: 2022-05-19

## 2022-05-19 DIAGNOSIS — Z12.11 SCREENING FOR COLON CANCER: Primary | ICD-10-CM

## 2022-05-19 NOTE — TELEPHONE ENCOUNTER
General Surgery End of Shift Nursing Note    Bedside shift change report given to Lizz Capps RN (oncoming nurse) by Evan Fernández RN (offgoing nurse). Report included the following information SBAR, Kardex, Procedure Summary, Intake/Output, MAR and Recent Results. Shift worked:   7p-7a   Summary of shift:    Patient rested well this night sleeping at long intervals, no complaints of pain, no distress noted   Issues for physician to address:   none     Number times ambulated in hallway past shift: 0    Number of times OOB to chair past shift: 0    Pain Management:  Current medication: none requested  Patient states pain is manageable on current pain medication: YES    GI:    Current diet:  DIET NPO    Tolerating current diet: YES  Passing flatus: YES    Respiratory:    Incentive Spirometer at bedside: YES  Patient instructed on use: YES    Patient Safety:    Falls Score: 2  Bed Alarm On? No  Sitter?  No    [facility] Assessment    Name: Mj Ramirez  YOB: 1970  Last Height: 5' 3" (1 6 m)  Last weight: 61 7 kg (136 lb)  BMI: 24 09 kg/m²  Procedure: Colon  Diagnosis: screening  Date of procedure: 7/15/22  Prep: Miralax, dulcolax, gatorade  Responsible : yes  Phone#: [phone]  Name completing form: Merlin Layer  Date form completed: 05/19/22      If the patient answers yes to any of these questions, schedule in a hospital  Are you pregnant: No  Do you rely on a wheelchair for mobility: No  Have you been diagnosed with End Stage Renal Disease (ESRD): No  Do you need oxygen during the day: No  Have you had a heart attack or stroke within the past three months: No  Have you had a seizure within the past three months: No  Have you ever been informed by anesthesia that you have a difficult airway: No  Additional Questions  Have you had any cardiac testing or are under the care of a Cardiologist (see cardiac list): No  Cardiac list:   Do you have an implanted cardiac defibrillator: No (Comment:  This patient should be scheduled in the hospital)    Have any bleeding problems, such as anemia or hemophilia (If patient has H&H result below 8, schedule in hospital   H&H must be within 30 days of procedure): No    Had an organ transplant within the past 3 months: No    Do you have any present infections: No  Do you get short of breath when walking a few blocks: No  Have you been diagnosed with diabetes: No  Comments (provide cardiac provider information if applicable):

## 2022-05-19 NOTE — TELEPHONE ENCOUNTER
05/19/22  Screened by: Louisa Hayes    Referring Provider Dr Cathi Vieira    Pre- Screening: There is no height or weight on file to calculate BMI  Ht 63" 136lbs  Has patient been referred for a routine screening Colonoscopy? yes  Is the patient between 39-70 years old? yes      Previous Colonoscopy yes   If yes:    Date: 15 years ago    Facility:     Reason:       SCHEDULING STAFF: If the patient is between 39yrs-47yrs, please advise patient to confirm benefits/coverage with their insurance company for a routine screening colonoscopy, some insurance carriers will only cover at [phone] or older  If the patient is over 66years old, please schedule an office visit  Does the patient want to see a Gastroenterologist prior to their procedure OR are they having any GI symptoms? no    Has the patient been hospitalized or had abdominal surgery in the past 6 months? no    Does the patient use supplemental oxygen? no    Does the patient take Coumadin, Lovenox, Plavix, Elliquis, Xarelto, or other blood thinning medication? no    Has the patient had a stroke, cardiac event, or stent placed in the past year? no    SCHEDULING STAFF: If patient answers NO to above questions, then schedule procedure  If patient answers YES to above questions, then schedule office appointment  If patient is between 45yrs - 49yrs, please advise patient that we will have to confirm benefits & coverage with their insurance company for a routine screening colonoscopy

## 2022-06-06 ENCOUNTER — OFFICE VISIT (OUTPATIENT)
Dept: URGENT CARE | Facility: CLINIC | Age: 52
End: 2022-06-06

## 2022-06-06 VITALS
DIASTOLIC BLOOD PRESSURE: 78 MMHG | SYSTOLIC BLOOD PRESSURE: 122 MMHG | HEART RATE: 62 BPM | OXYGEN SATURATION: 97 % | TEMPERATURE: 99.6 F | RESPIRATION RATE: 16 BRPM

## 2022-06-06 DIAGNOSIS — L25.5 RHUS DERMATITIS: Primary | ICD-10-CM

## 2022-06-06 RX ORDER — TRIAMCINOLONE ACETONIDE 5 MG/G
OINTMENT TOPICAL 2 TIMES DAILY
Qty: 30 G | Refills: 0 | Status: SHIPPED | OUTPATIENT
Start: 2022-06-06 | End: 2022-06-21 | Stop reason: SDUPTHER

## 2022-06-06 NOTE — PROGRESS NOTES
[facility]        NAME: Kat Neri is a 47 y o  female  : 1970    MRN: [id number]  DATE: 2022  TIME: 12:48 PM    Assessment and Plan   Rhus dermatitis [L25 5]  1  Rhus dermatitis  triamcinolone (KENALOG) 0 5 % ointment         Patient Instructions       Use triamcinolone ointment as prescribed  Avoid scratching  Recommend calamine lotion and aveno products  Follow up with your PCP or return to the clinic if symptoms worsen or persist more than 3-5 days  Proceed to  ER if symptoms worsen  Chief Complaint     Chief Complaint   Patient presents with    Rash         History of Present Illness       Poison ivy rash to b/l legs x 1 week  She was doing yardwork prior to onset  Puritic rash to right ankle, posterior right thigh, medial left thigh, left heel  She tried OTC anti-itch cream and a home remedy with peroxide and vicks rub- did not help  No fever or chills  Review of Systems   Review of Systems   Constitutional: Negative  Negative for chills and fever  HENT: Negative  Respiratory: Negative  Cardiovascular: Negative  Musculoskeletal: Negative  Skin: Positive for rash (itchy rash to b/l legs)  Allergic/Immunologic: Positive for environmental allergies  All other systems reviewed and are negative          Current Medications       Current Outpatient Medications:     ALPRAZolam (XANAX) 0 25 mg tablet, Daily, Disp: , Rfl:     Cholecalciferol 125 MCG (5000 UT) capsule, Daily, Disp: , Rfl:     Cyanocobalamin (VITAMIN B-12 PO), Take by mouth, Disp: , Rfl:     Levonorgest-Eth Estrad 91-Day (Camrese Lo) 0 1-0 02 & 0 01 MG TABS, Take 1 tablet by mouth daily, Disp: 91 tablet, Rfl: 3    levothyroxine 75 mcg tablet, levothyroxine 75 mcg tablet, Disp: , Rfl:     losartan (COZAAR) 50 mg tablet, Daily, Disp: , Rfl:     Multiple Vitamins-Minerals (VITAMIN D3 COMPLETE PO), Daily, Disp: , Rfl:     triamcinolone (KENALOG) 0 5 % ointment, Apply topically 2 (two) times a day, Disp: 30 g, Rfl: 0    venlafaxine (EFFEXOR-XR) 75 mg 24 hr capsule, venlafaxine ER 75 mg capsule,extended release 24 hr  TAKE ONE CAPSULE BY MOUTH EVERY DAY, Disp: , Rfl:     ondansetron (ZOFRAN-ODT) 4 mg disintegrating tablet, ondansetron 4 mg disintegrating tablet (Patient not taking: Reported on 6/6/2022), Disp: , Rfl:     Current Allergies     Allergies as of 06/06/2022 - Reviewed 06/06/2022   Allergen Reaction Noted    No active allergies      Sertraline Hives 02/22/2021            The following portions of the patient's history were reviewed and updated as appropriate: allergies, current medications, past family history, past medical history, past social history, past surgical history and problem list      Past Medical History:   Diagnosis Date    Allergic rhinitis     Cataracts, bilateral     HL (hearing loss)     Liver hemangioma     Tinnitus        Past Surgical History:   Procedure Laterality Date    KNEE SURGERY Left 12/2006    RECTAL PROLAPSE REPAIR      SMALL INTESTINE SURGERY  2004       Family History   Problem Relation Age of Onset    Hypertension Mother     Hyperlipidemia Mother     Heart attack Father     Hypertension Father     Hyperlipidemia Father     Stroke Father     Skin cancer Father     Migraines Sister     Migraines Brother     Breast cancer Maternal Grandmother 55         Medications have been verified  Objective   /78 (BP Location: Right arm, Patient Position: Sitting, Cuff Size: Adult)   Pulse 62   Temp 99 6 °F (37 6 °C) (Tympanic)   Resp 16   SpO2 97%   No LMP recorded  Physical Exam     Physical Exam  Vitals reviewed  Constitutional:       Appearance: Normal appearance  She is well-developed  HENT:      Head: Normocephalic  Right Ear: External ear normal       Left Ear: External ear normal       Nose: Nose normal       Mouth/Throat:      Mouth: Mucous membranes are moist       Pharynx: Oropharynx is clear  Eyes:      Conjunctiva/sclera: Conjunctivae normal    Cardiovascular:      Rate and Rhythm: Normal rate and regular rhythm  Pulses: Normal pulses  Pulmonary:      Effort: Pulmonary effort is normal    Musculoskeletal:         General: Normal range of motion  Skin:     General: Skin is warm and dry  Capillary Refill: Capillary refill takes less than 2 seconds  Findings: Rash present  Rash is vesicular  Comments: Few linear patters present   Neurological:      General: No focal deficit present  Mental Status: She is alert and oriented to person, place, and time     Psychiatric:         Mood and Affect: Mood normal          Behavior: Behavior normal

## 2022-06-06 NOTE — PATIENT INSTRUCTIONS
Use triamcinolone ointment as prescribed  Avoid scratching  Recommend calamine lotion and aveno products  Follow up with your PCP or return to the clinic if symptoms worsen or persist more than 3-5 days

## 2022-06-06 NOTE — ASSESSMENT & PLAN NOTE
History and exam findings consistent with poison ivy dermatitis  Rx written for triamcinolone ointment  Discussed poison ivy rash prevention  Reasons to follow up reviewed with pt

## 2022-06-21 ENCOUNTER — OFFICE VISIT (OUTPATIENT)
Dept: URGENT CARE | Facility: CLINIC | Age: 52
End: 2022-06-21

## 2022-06-21 VITALS
RESPIRATION RATE: 16 BRPM | OXYGEN SATURATION: 96 % | HEART RATE: 78 BPM | TEMPERATURE: 99.1 F | SYSTOLIC BLOOD PRESSURE: 138 MMHG | DIASTOLIC BLOOD PRESSURE: 84 MMHG

## 2022-06-21 DIAGNOSIS — L03.113 CELLULITIS OF RIGHT UPPER EXTREMITY: Primary | ICD-10-CM

## 2022-06-21 DIAGNOSIS — L25.5 RHUS DERMATITIS: ICD-10-CM

## 2022-06-21 RX ORDER — CEPHALEXIN 500 MG/1
500 CAPSULE ORAL EVERY 6 HOURS SCHEDULED
Qty: 28 CAPSULE | Refills: 0 | Status: SHIPPED | OUTPATIENT
Start: 2022-06-21 | End: 2022-06-28

## 2022-06-21 RX ORDER — TRIAMCINOLONE ACETONIDE 5 MG/G
OINTMENT TOPICAL 2 TIMES DAILY
Qty: 30 G | Refills: 0 | Status: SHIPPED | OUTPATIENT
Start: 2022-06-21

## 2022-06-21 NOTE — ASSESSMENT & PLAN NOTE
Poison ivy rash to R forearm, refill provided for triamcinolone ointment  Pt is currently taking Medrol douglas Rx'ed by her PCP- rash improved today  Reasons to follow up reviewed with pt

## 2022-06-21 NOTE — ASSESSMENT & PLAN NOTE
History and exam findings consistent with cellulitis, likely secondary to scratching  Rx written for Keflex  Pt educated to watch for s/s worsening infection and discussed reasons to follow up

## 2022-06-21 NOTE — PROGRESS NOTES
[facility]        NAME: Kaylah Ovalles is a 47 y o  female  : 1970    MRN: [id number]  DATE: 2022  TIME: 10:04 AM    Assessment and Plan   Cellulitis of right upper extremity [L03 113]  1  Cellulitis of right upper extremity  cephalexin (KEFLEX) 500 mg capsule   2  Rhus dermatitis  triamcinolone (KENALOG) 0 5 % ointment         Patient Instructions       Take Keflex as prescribed  Take with food  Use Triamcinolone ointment as needed for rash  Watch for signs of worsening infection, such as increasing redness and/or pain, warmth, or discharge  Follow up if these symptoms occur  Follow up with your PCP or return to the clinic if symptoms worsen or persist more than 3-5 days  Proceed to  ER if symptoms worsen  Chief Complaint     Chief Complaint   Patient presents with    Rash     R forearm - poison ivy rash          History of Present Illness       Poison ivy rash to her R forearm x 4 days  Symptoms worsened 2 days ago  Puritic rash with vesicles present on forearm  Serous weaping present  She was moving trash into the trash can and pushing down the garbage, forgetting that there were poison ivy leaves in the garbage  No fever or chills  She had a virtual visit with her PCP yesterday and Rx for Prednisone was given  Symptoms improved today  Redness, swelling, warmth to the touch  She tried Coralee Solders as well- did not help  She was tx for poison ivy on her legs 2 weeks ago on her legs  Rx written for triamcinolone cream- symptoms resolved  This is a new rash/ episode  Review of Systems   Review of Systems   Constitutional: Negative  Negative for chills and fever  HENT: Negative  Negative for congestion, postnasal drip, rhinorrhea, sinus pressure, sneezing and sore throat  Respiratory: Negative  Cardiovascular: Negative  Musculoskeletal: Negative for arthralgias and joint swelling     Skin: Positive for color change (erythema to R forearm) and rash (puritic rash to R forearm)  Allergic/Immunologic: Positive for environmental allergies  All other systems reviewed and are negative          Current Medications       Current Outpatient Medications:     ALPRAZolam (XANAX) 0 25 mg tablet, Daily, Disp: , Rfl:     cephalexin (KEFLEX) 500 mg capsule, Take 1 capsule (500 mg total) by mouth every 6 (six) hours for 7 days, Disp: 28 capsule, Rfl: 0    Cholecalciferol 125 MCG (5000 UT) capsule, Daily, Disp: , Rfl:     Cyanocobalamin (VITAMIN B-12 PO), Take by mouth, Disp: , Rfl:     Levonorgest-Eth Estrad 91-Day (Camrese Lo) 0 1-0 02 & 0 01 MG TABS, Take 1 tablet by mouth daily, Disp: 91 tablet, Rfl: 3    levothyroxine 75 mcg tablet, levothyroxine 75 mcg tablet, Disp: , Rfl:     losartan (COZAAR) 50 mg tablet, Daily, Disp: , Rfl:     methylPREDNISolone (MEDROL, ABENA, PO), Medrol (Abena) 4 mg tablets in a dose pack  MEDROL DOSE PACK AS DIRECTED, Disp: , Rfl:     Multiple Vitamins-Minerals (VITAMIN D3 COMPLETE PO), Daily, Disp: , Rfl:     ondansetron (ZOFRAN-ODT) 4 mg disintegrating tablet, , Disp: , Rfl:     triamcinolone (KENALOG) 0 5 % ointment, Apply topically 2 (two) times a day, Disp: 30 g, Rfl: 0    venlafaxine (EFFEXOR-XR) 75 mg 24 hr capsule, venlafaxine ER 75 mg capsule,extended release 24 hr  TAKE ONE CAPSULE BY MOUTH EVERY DAY, Disp: , Rfl:     Current Allergies     Allergies as of 06/21/2022 - Reviewed 06/21/2022   Allergen Reaction Noted    Sertraline Hives 02/22/2021            The following portions of the patient's history were reviewed and updated as appropriate: allergies, current medications, past family history, past medical history, past social history, past surgical history and problem list      Past Medical History:   Diagnosis Date    Allergic rhinitis     Cataracts, bilateral     HL (hearing loss)     Liver hemangioma     Tinnitus        Past Surgical History:   Procedure Laterality Date    KNEE SURGERY Left 12/2006    RECTAL PROLAPSE REPAIR      SMALL INTESTINE SURGERY  2004       Family History   Problem Relation Age of Onset    Hypertension Mother     Hyperlipidemia Mother     Heart attack Father     Hypertension Father     Hyperlipidemia Father     Stroke Father     Skin cancer Father     Migraines Sister     Migraines Brother     Breast cancer Maternal Grandmother 55         Medications have been verified  Objective   /84 (BP Location: Right arm, Patient Position: Sitting, Cuff Size: Adult)   Pulse 78   Temp 99 1 °F (37 3 °C) (Tympanic)   Resp 16   SpO2 96%   No LMP recorded  (Menstrual status: Birth Control)  Physical Exam     Physical Exam  Vitals reviewed  Constitutional:       Appearance: Normal appearance  HENT:      Head: Normocephalic and atraumatic  Right Ear: External ear normal       Left Ear: External ear normal       Nose: Nose normal       Mouth/Throat:      Mouth: Mucous membranes are moist       Pharynx: Oropharynx is clear  Eyes:      Conjunctiva/sclera: Conjunctivae normal    Cardiovascular:      Rate and Rhythm: Normal rate and regular rhythm  Pulses: Normal pulses  Pulmonary:      Effort: Pulmonary effort is normal    Musculoskeletal:         General: Normal range of motion  Skin:     General: Skin is warm and dry  Capillary Refill: Capillary refill takes less than 2 seconds  Findings: Erythema and rash present  Rash is vesicular  Comments: Area of erythema (10 x 7 cm) to R forearm, mild swelling present  Area of erythema marked with skin marking pen  Neurological:      General: No focal deficit present  Mental Status: She is alert     Psychiatric:         Mood and Affect: Mood normal          Behavior: Behavior normal

## 2022-06-21 NOTE — PATIENT INSTRUCTIONS
Take Keflex as prescribed  Take with food  Use Triamcinolone ointment as needed for rash  Watch for signs of worsening infection, such as increasing redness and/or pain, warmth, or discharge  Follow up if these symptoms occur  Follow up with your PCP or return to the clinic if symptoms worsen or persist more than 3-5 days

## 2022-07-15 ENCOUNTER — HOSPITAL ENCOUNTER (OUTPATIENT)
Dept: GASTROENTEROLOGY | Facility: AMBULATORY SURGERY CENTER | Age: 52
Discharge: HOME | End: 2022-07-15
Payer: COMMERCIAL

## 2022-07-15 ENCOUNTER — ANESTHESIA (OUTPATIENT)
Dept: GASTROENTEROLOGY | Facility: AMBULATORY SURGERY CENTER | Age: 52
End: 2022-07-15

## 2022-07-15 ENCOUNTER — ANESTHESIA EVENT (OUTPATIENT)
Dept: GASTROENTEROLOGY | Facility: AMBULATORY SURGERY CENTER | Age: 52
End: 2022-07-15

## 2022-07-15 VITALS
HEART RATE: 68 BPM | HEIGHT: 62 IN | TEMPERATURE: 98.8 F | WEIGHT: 135 LBS | OXYGEN SATURATION: 98 % | SYSTOLIC BLOOD PRESSURE: 124 MMHG | DIASTOLIC BLOOD PRESSURE: 68 MMHG | RESPIRATION RATE: 18 BRPM | BODY MASS INDEX: 24.84 KG/M2

## 2022-07-15 DIAGNOSIS — Z12.11 SCREENING FOR COLON CANCER: ICD-10-CM

## 2022-07-15 LAB
EXT PREGNANCY TEST URINE: NEGATIVE
EXT. CONTROL: NORMAL

## 2022-07-15 PROCEDURE — 45380 COLONOSCOPY AND BIOPSY: CPT | Performed by: INTERNAL MEDICINE

## 2022-07-15 PROCEDURE — 88305 TISSUE EXAM BY PATHOLOGIST: CPT | Performed by: PATHOLOGY

## 2022-07-15 RX ORDER — PROPOFOL 10 MG/ML
INJECTION, EMULSION INTRAVENOUS AS NEEDED
Status: DISCONTINUED | OUTPATIENT
Start: 2022-07-15 | End: 2022-07-15

## 2022-07-15 RX ORDER — SODIUM CHLORIDE 9 MG/ML
INJECTION, SOLUTION INTRAVENOUS CONTINUOUS PRN
Status: DISCONTINUED | OUTPATIENT
Start: 2022-07-15 | End: 2022-07-15

## 2022-07-15 RX ORDER — LIDOCAINE HYDROCHLORIDE 20 MG/ML
INJECTION, SOLUTION EPIDURAL; INFILTRATION; INTRACAUDAL; PERINEURAL AS NEEDED
Status: DISCONTINUED | OUTPATIENT
Start: 2022-07-15 | End: 2022-07-15

## 2022-07-15 RX ADMIN — PROPOFOL 60 MG: 10 INJECTION, EMULSION INTRAVENOUS at 09:58

## 2022-07-15 RX ADMIN — LIDOCAINE HYDROCHLORIDE 100 MG: 20 INJECTION, SOLUTION EPIDURAL; INFILTRATION; INTRACAUDAL; PERINEURAL at 09:56

## 2022-07-15 RX ADMIN — PROPOFOL 30 MG: 10 INJECTION, EMULSION INTRAVENOUS at 10:10

## 2022-07-15 RX ADMIN — PROPOFOL 20 MG: 10 INJECTION, EMULSION INTRAVENOUS at 10:00

## 2022-07-15 RX ADMIN — PROPOFOL 30 MG: 10 INJECTION, EMULSION INTRAVENOUS at 10:01

## 2022-07-15 RX ADMIN — PROPOFOL 30 MG: 10 INJECTION, EMULSION INTRAVENOUS at 10:06

## 2022-07-15 RX ADMIN — PROPOFOL 90 MG: 10 INJECTION, EMULSION INTRAVENOUS at 09:56

## 2022-07-15 RX ADMIN — PROPOFOL 30 MG: 10 INJECTION, EMULSION INTRAVENOUS at 09:59

## 2022-07-15 RX ADMIN — SODIUM CHLORIDE: 9 INJECTION, SOLUTION INTRAVENOUS at 09:49

## 2022-07-15 RX ADMIN — PROPOFOL 30 MG: 10 INJECTION, EMULSION INTRAVENOUS at 10:03

## 2022-07-15 NOTE — ANESTHESIA PREPROCEDURE EVALUATION
Procedure:  COLONOSCOPY    Relevant Problems   CARDIO   (+) Hyperlipidemia   (+) Hypertension      ENDO   (+) Hypothyroidism   (+) Idiopathic atrophic hypothyroidism      NEURO/PSYCH   (+) Generalized anxiety disorder      Other   (+) Tonsillolith        Physical Exam    Airway    Mallampati score: III  TM Distance: >3 FB  Neck ROM: full     Dental   No notable dental hx     Cardiovascular  Cardiovascular exam normal    Pulmonary  Pulmonary exam normal     Other Findings        Anesthesia Plan  ASA Score- 2     Anesthesia Type- IV sedation with anesthesia with ASA Monitors  Additional Monitors:   Airway Plan:           Plan Factors-Exercise tolerance (METS): >4 METS  Chart reviewed  Patient summary reviewed  Patient is not a current smoker  Induction- intravenous  Postoperative Plan-     Informed Consent- Anesthetic plan and risks discussed with patient

## 2022-07-15 NOTE — ANESTHESIA POSTPROCEDURE EVALUATION
Post-Op Assessment Note    CV Status:  Stable  Pain Score: 0    Pain management: adequate     Mental Status:  Arousable   Hydration Status:  Euvolemic   PONV Controlled:  Controlled   Airway Patency:  Patent      Post Op Vitals Reviewed: Yes      Staff: Anesthesiologist, CRNA         No complications documented      BP   107/76   Temp      Pulse 65   Resp 12   SpO2 98

## 2022-08-09 ENCOUNTER — OFFICE VISIT (OUTPATIENT)
Dept: FAMILY MEDICINE CLINIC | Facility: CLINIC | Age: 52
End: 2022-08-09
Payer: COMMERCIAL

## 2022-08-09 VITALS
BODY MASS INDEX: 24.73 KG/M2 | HEIGHT: 63 IN | DIASTOLIC BLOOD PRESSURE: 82 MMHG | TEMPERATURE: 98.6 F | RESPIRATION RATE: 16 BRPM | HEART RATE: 59 BPM | SYSTOLIC BLOOD PRESSURE: 122 MMHG | WEIGHT: 139.6 LBS | OXYGEN SATURATION: 99 %

## 2022-08-09 DIAGNOSIS — E03.4 IDIOPATHIC ATROPHIC HYPOTHYROIDISM: ICD-10-CM

## 2022-08-09 DIAGNOSIS — F41.1 GENERALIZED ANXIETY DISORDER: ICD-10-CM

## 2022-08-09 DIAGNOSIS — I10 PRIMARY HYPERTENSION: Primary | ICD-10-CM

## 2022-08-09 DIAGNOSIS — J30.1 SEASONAL ALLERGIC RHINITIS DUE TO POLLEN: ICD-10-CM

## 2022-08-09 PROBLEM — R03.0 ELEVATED BLOOD-PRESSURE READING WITHOUT DIAGNOSIS OF HYPERTENSION: Status: RESOLVED | Noted: 2020-10-20 | Resolved: 2022-08-09

## 2022-08-09 PROBLEM — R23.8 EASY BRUISING: Status: RESOLVED | Noted: 2021-08-16 | Resolved: 2022-08-09

## 2022-08-09 PROBLEM — Z13.31 SCREENING FOR DEPRESSION: Status: RESOLVED | Noted: 2020-07-09 | Resolved: 2022-08-09

## 2022-08-09 PROBLEM — R94.6 ABNORMAL FINDING ON THYROID FUNCTION TEST: Status: RESOLVED | Noted: 2020-02-17 | Resolved: 2022-08-09

## 2022-08-09 PROBLEM — H65.01 NON-RECURRENT ACUTE SEROUS OTITIS MEDIA OF RIGHT EAR: Status: RESOLVED | Noted: 2022-01-10 | Resolved: 2022-08-09

## 2022-08-09 PROBLEM — L03.113 CELLULITIS OF RIGHT UPPER EXTREMITY: Status: RESOLVED | Noted: 2022-06-21 | Resolved: 2022-08-09

## 2022-08-09 PROBLEM — Z01.419 ROUTINE GYNECOLOGICAL EXAMINATION: Status: RESOLVED | Noted: 2022-04-06 | Resolved: 2022-08-09

## 2022-08-09 PROBLEM — E03.9 HYPOTHYROIDISM: Status: RESOLVED | Noted: 2021-08-16 | Resolved: 2022-08-09

## 2022-08-09 PROBLEM — S99.922A TOE INJURY, LEFT, INITIAL ENCOUNTER: Status: RESOLVED | Noted: 2021-11-01 | Resolved: 2022-08-09

## 2022-08-09 PROBLEM — Z91.09 ENVIRONMENTAL ALLERGIES: Status: RESOLVED | Noted: 2019-05-20 | Resolved: 2022-08-09

## 2022-08-09 PROBLEM — R23.3 EASY BRUISING: Status: RESOLVED | Noted: 2021-08-16 | Resolved: 2022-08-09

## 2022-08-09 PROBLEM — Z01.419 GYNECOLOGIC EXAM NORMAL: Status: RESOLVED | Noted: 2019-10-24 | Resolved: 2022-08-09

## 2022-08-09 PROBLEM — R05.9 COUGH: Status: RESOLVED | Noted: 2019-05-22 | Resolved: 2022-08-09

## 2022-08-09 PROCEDURE — 99213 OFFICE O/P EST LOW 20 MIN: CPT

## 2022-08-09 NOTE — PROGRESS NOTES
Assessment/Plan:         Problem List Items Addressed This Visit        Endocrine    Idiopathic atrophic hypothyroidism     Under control  Continue current medication  We will re-evaluate at next office visit  Respiratory    Allergic rhinitis due to pollen     Under control  Continue current medication  We will re-evaluate at next office visit  Cardiovascular and Mediastinum    Hypertension - Primary     Under control  Continue current medication  We will re-evaluate at next office visit  Other    Generalized anxiety disorder     Under control  Continue current medication  We will re-evaluate at next office visit  Subjective:      Patient ID: Ebony Dillard is a 47 y o  female  Patient here for review of chronic medical problems and review of the labs and imaging if it is applicable  Currently has no specific complaints other than mentioned in the review of systems  Denies chest pain, SOB, cough, abdominal pain, nausea, vomiting, fever, chills, lightheadedness, dizziness,headache, tingling or numbness  No bowel or bladder problem  The following portions of the patient's history were reviewed and updated as appropriate:   Past Medical History:  She has a past medical history of Allergic rhinitis, Disease of thyroid gland, HL (hearing loss), Hypertension, Liver hemangioma, and Tinnitus  ,  _______________________________________________________________________  Medical Problems:  does not have any pertinent problems on file ,  _______________________________________________________________________  Past Surgical History:   has a past surgical history that includes Rectal prolapse repair; Knee surgery (Left, 12/2006); Rectal surgery; Colonoscopy (07/15/2022); and [place] tooth extraction  ,  _______________________________________________________________________  Family History:  family history includes Breast cancer (age of onset: 54) in her maternal grandmother; Dementia in her mother; Heart attack in her father; Hyperlipidemia in her father and mother; Hypertension in her father and mother; Migraines in her brother and sister; Skin cancer in her father; Stroke in her father ,  _______________________________________________________________________  Social History:   reports that she quit smoking about 28 years ago  Her smoking use included cigarettes  She smoked 0 25 packs per day  She has never used smokeless tobacco  She reports current alcohol use  She reports that she does not use drugs  ,  _______________________________________________________________________  Allergies:  is allergic to sertraline     _______________________________________________________________________  Current Outpatient Medications   Medication Sig Dispense Refill    ALPRAZolam (XANAX) 0 25 mg tablet Daily      Camrese Lo 0 1-0 02 & 0 01 MG TABS TAKE ONE TABLET BY MOUTH EVERY DAY 91 tablet 2    Cholecalciferol 125 MCG (5000 UT) capsule Daily      Cyanocobalamin (VITAMIN B-12 PO) Take by mouth      levothyroxine 75 mcg tablet levothyroxine 75 mcg tablet      losartan (COZAAR) 50 mg tablet Daily      ondansetron (ZOFRAN-ODT) 4 mg disintegrating tablet       triamcinolone (KENALOG) 0 5 % ointment Apply topically 2 (two) times a day 30 g 0    venlafaxine (EFFEXOR-XR) 75 mg 24 hr capsule venlafaxine ER 75 mg capsule,extended release 24 hr   TAKE ONE CAPSULE BY MOUTH EVERY DAY      Multiple Vitamins-Minerals (VITAMIN D3 COMPLETE PO) Daily (Patient not taking: Reported on 8/9/2022)       No current facility-administered medications for this visit      _______________________________________________________________________  Review of Systems   Constitutional: Negative for chills, fatigue and fever  HENT: Negative for congestion, ear pain, rhinorrhea, sneezing and sore throat  Eyes: Negative for pain, redness and visual disturbance     Respiratory: Negative for cough, chest tightness and shortness of breath  Cardiovascular: Negative for chest pain, palpitations and leg swelling  Gastrointestinal: Negative for abdominal pain, blood in stool, diarrhea, nausea and vomiting  Endocrine: Negative for cold intolerance and heat intolerance  Genitourinary: Negative for dysuria, frequency and hematuria  Musculoskeletal: Positive for arthralgias (leftt shoulder)  Negative for back pain  Skin: Negative for color change and rash  Neurological: Negative for dizziness, weakness, light-headedness, numbness and headaches  Hematological: Does not bruise/bleed easily  Psychiatric/Behavioral: Negative for behavioral problems and confusion  Objective:  Vitals:    08/09/22 1513   BP: 122/82   BP Location: Left arm   Patient Position: Sitting   Cuff Size: Standard   Pulse: 59   Resp: 16   Temp: 98 6 °F (37 °C)   TempSrc: Tympanic   SpO2: 99%   Weight: 63 3 kg (139 lb 9 6 oz)   Height: 5' 3" (1 6 m)     Body mass index is 24 73 kg/m²  Physical Exam  Constitutional:       General: She is not in acute distress  Appearance: Normal appearance  She is not ill-appearing, toxic-appearing or diaphoretic  HENT:      Head: Normocephalic and atraumatic  Right Ear: Tympanic membrane normal       Left Ear: Tympanic membrane normal       Nose: Nose normal  No congestion  Mouth/Throat:      Mouth: Mucous membranes are moist    Eyes:      General: No scleral icterus  Right eye: No discharge  Left eye: No discharge  Extraocular Movements: Extraocular movements intact  Conjunctiva/sclera: Conjunctivae normal       Pupils: Pupils are equal, round, and reactive to light  Cardiovascular:      Rate and Rhythm: Normal rate and regular rhythm  Pulses: Normal pulses  Heart sounds: Normal heart sounds  No murmur heard  No gallop  Pulmonary:      Effort: Pulmonary effort is normal  No respiratory distress        Breath sounds: Normal breath sounds  No wheezing, rhonchi or rales  Abdominal:      General: Abdomen is flat  Bowel sounds are normal  There is no distension  Palpations: Abdomen is soft  Tenderness: There is no abdominal tenderness  There is no guarding  Musculoskeletal:         General: No swelling or tenderness  Normal range of motion  Cervical back: Normal range of motion and neck supple  No rigidity  Lymphadenopathy:      Cervical: No cervical adenopathy  Skin:     General: Skin is warm  Capillary Refill: Capillary refill takes 2 to 3 seconds  Coloration: Skin is not jaundiced  Findings: No bruising or rash  Neurological:      General: No focal deficit present  Mental Status: She is alert and oriented to person, place, and time  Mental status is at baseline        Gait: Gait normal    Psychiatric:         Mood and Affect: Mood normal

## 2022-08-29 ENCOUNTER — OFFICE VISIT (OUTPATIENT)
Dept: OBGYN CLINIC | Facility: CLINIC | Age: 52
End: 2022-08-29
Payer: COMMERCIAL

## 2022-08-29 ENCOUNTER — APPOINTMENT (OUTPATIENT)
Dept: RADIOLOGY | Facility: AMBULARY SURGERY CENTER | Age: 52
End: 2022-08-29
Payer: COMMERCIAL

## 2022-08-29 VITALS
WEIGHT: 139 LBS | DIASTOLIC BLOOD PRESSURE: 83 MMHG | HEART RATE: 65 BPM | HEIGHT: 63 IN | SYSTOLIC BLOOD PRESSURE: 147 MMHG | BODY MASS INDEX: 24.63 KG/M2

## 2022-08-29 DIAGNOSIS — M25.512 CHRONIC LEFT SHOULDER PAIN: Primary | ICD-10-CM

## 2022-08-29 DIAGNOSIS — G89.29 CHRONIC LEFT SHOULDER PAIN: Primary | ICD-10-CM

## 2022-08-29 DIAGNOSIS — M25.512 LEFT SHOULDER PAIN, UNSPECIFIED CHRONICITY: ICD-10-CM

## 2022-08-29 PROCEDURE — 73030 X-RAY EXAM OF SHOULDER: CPT

## 2022-08-29 PROCEDURE — 99203 OFFICE O/P NEW LOW 30 MIN: CPT | Performed by: PHYSICAL MEDICINE & REHABILITATION

## 2022-08-29 PROCEDURE — 20610 DRAIN/INJ JOINT/BURSA W/O US: CPT | Performed by: PHYSICAL MEDICINE & REHABILITATION

## 2022-08-29 RX ORDER — TRIAMCINOLONE ACETONIDE 40 MG/ML
80 INJECTION, SUSPENSION INTRA-ARTICULAR; INTRAMUSCULAR
Status: COMPLETED | OUTPATIENT
Start: 2022-08-29 | End: 2022-08-29

## 2022-08-29 RX ORDER — LIDOCAINE HYDROCHLORIDE 10 MG/ML
3 INJECTION, SOLUTION INFILTRATION; PERINEURAL
Status: COMPLETED | OUTPATIENT
Start: 2022-08-29 | End: 2022-08-29

## 2022-08-29 RX ADMIN — TRIAMCINOLONE ACETONIDE 80 MG: 40 INJECTION, SUSPENSION INTRA-ARTICULAR; INTRAMUSCULAR at 14:36

## 2022-08-29 RX ADMIN — LIDOCAINE HYDROCHLORIDE 3 ML: 10 INJECTION, SOLUTION INFILTRATION; PERINEURAL at 14:36

## 2022-08-29 NOTE — PATIENT INSTRUCTIONS
You had a cortisone injection today  Some people also refer to this as steroid or corticosteroid  There are two medicines in this injection, a numbing medicine (anesthetic) and a steroid  Most people get relief immediately but it can take up to two weeks  Rarely, some people can get a flushing reaction where they feel warm and flushed in the face  This is a side effect and resolves on its own  If you experience any drainage, redness or fevers, please give us a call or go to the nearest emergency room  You can obtain diclofenac cream/gel/ointment over the counter  Many brands make this medication and they include Voltaren, Aspercreme and Aleve  You can use this up to 3 times per day  It is best to wash the area with water and then pat dry  The cream absorbs better after this  Be careful not to let any pets or children get in contact with the medication as it can be harmful to them  If you develop a skin reaction, stop using the cream      Rules for limiting activity by pain symptoms:      -You can work through some pain, but keep it at a level from which you are distractible  Pain should not exceed 3/10 in severity    -Do not change your biomechanics / form with your activity  This can lead to further injury    -If you pay for your activity later with substantial symptom exacerbation, you are not yet ready for that level of exertion

## 2022-08-29 NOTE — PROGRESS NOTES
1  Chronic left shoulder pain  XR shoulder 2+ vw left    Large joint arthrocentesis: L subacromial bursa    CANCELED: XR shoulder 2+ vw left     Orders Placed This Encounter   Procedures    Large joint arthrocentesis: L subacromial bursa    XR shoulder 2+ vw left     Impression:  Left shoulder pain likely secondary to subacromial impingement  We discussed different treatment options and decided to proceed with a subacromial space steroid injection  The patient will start her home exercise program that she has from a previous stint of physical therapy  I will see her back in four weeks if needed  Imaging Studies (I personally reviewed images in PACS and report):  Left shoulder x-rays most recent to this encounter reviewed  These images show no acute osseous abnormalities or severe degeneration  Return if symptoms worsen or fail to improve  Patient is in agreement with the above plan  HPI:  Vidhya Weiss is a 47 y o  female  who presents for evaluation of   Chief Complaint   Patient presents with    Left Shoulder - Pain       Onset/Mechanism:  Chronic pain for about four months without a recent injury  Location: Anterior shoulder  Radiation: Down the arm but not past the elbow  Provocative: Lifting the arm  Severity: This weekend was painful  Associated Symptoms: As above  Treatment so far: Home exercises      Following history reviewed and updated:  Past Medical History:   Diagnosis Date    Allergic rhinitis     Disease of thyroid gland     hypothyroidism    HL (hearing loss)     Hypertension     Liver hemangioma     Tinnitus      Past Surgical History:   Procedure Laterality Date    COLONOSCOPY  07/15/2022    KNEE SURGERY Left 12/2006    RECTAL PROLAPSE REPAIR      RECTAL SURGERY      prolapsed rectum repair x2    WISDOM TOOTH EXTRACTION       Social History   Social History     Substance and Sexual Activity   Alcohol Use Yes    Comment: holiday only-twice/year     Social History Substance and Sexual Activity   Drug Use Never     Social History     Tobacco Use   Smoking Status Former Smoker    Packs/day: 0 25    Types: Cigarettes    Quit date: 12    Years since quittin 6   Smokeless Tobacco Never Used   Tobacco Comment    social cigarette use     Family History   Problem Relation Age of Onset    Hypertension Mother     Hyperlipidemia Mother     Dementia Mother     Heart attack Father         x2    Hypertension Father     Hyperlipidemia Father     Stroke Father     Skin cancer Father     Migraines Sister     Migraines Brother     Breast cancer Maternal Grandmother 55     Allergies   Allergen Reactions    Sertraline Hives        Constitutional:  /83 (BP Location: Right arm, Patient Position: Sitting, Cuff Size: Adult)   Pulse 65   Ht 5' 3" (1 6 m)   Wt 63 kg (139 lb)   BMI 24 62 kg/m²    General: NAD  Eyes: Anicteric sclerae  Neck: Supple  Lungs: Unlabored breathing  Cardiovascular: No lower extremity edema  Skin: Intact without erythema  Neurologic: Sensation intact to light touch  Psychiatric: Mood and affect are appropriate  Left Shoulder Exam     Tenderness   The patient is experiencing tenderness in the acromion  Range of Motion   The patient has normal left shoulder ROM  Tests   Mejia test: positive  Impingement: positive    Other   Erythema: absent  Scars: absent  Sensation: normal  Pulse: present              Large joint arthrocentesis: L subacromial bursa  Universal Protocol:  Consent: Verbal consent obtained  Written consent not obtained    Risks and benefits: risks, benefits and alternatives were discussed  Consent given by: patient  Timeout called at: 2022 2:36 PM   Patient understanding: patient states understanding of the procedure being performed  Site marked: the operative site was marked  Patient identity confirmed: verbally with patient    Supporting Documentation  Indications: pain   Procedure Details  Location: shoulder - L subacromial bursa  Needle gauge: 21G 2''  Ultrasound guidance: no  Approach: posterolateral  Medications administered: 3 mL lidocaine 1 %; 80 mg triamcinolone acetonide 40 mg/mL    Patient tolerance: patient tolerated the procedure well with no immediate complications  Dressing:  Sterile dressing applied    Prior to the procedure, the patient was informed of the following risks in layman terms:    - Risk of bleeding since a needle is involved  - Risk of infection (1/10,000 chance as per recent studies)  Signs/symptoms were discussed and they would prompt an urgent evaluation at an emergency department   - Risk of pigmentation or skin dimpling in the skin (2-3% chance as per recent studies) from the steroid  - Risk of increased pain from steroid flare (1% chance as per recent studies) that typically lasts 24-48 hours  - Risk of increased blood sugars from the steroid medication that can last for a few weeks  If the patient is a diabetic or pre-diabetic, they were encouraged to closely monitor their blood sugars and discuss with PCP if elevated more than usual or if having symptoms  After going over these risks, we decided that the benefits outweigh the risks and proceeded with the procedure

## 2022-09-17 DIAGNOSIS — F41.1 GENERALIZED ANXIETY DISORDER: ICD-10-CM

## 2022-09-17 DIAGNOSIS — I10 PRIMARY HYPERTENSION: ICD-10-CM

## 2022-09-17 DIAGNOSIS — E03.4 IDIOPATHIC ATROPHIC HYPOTHYROIDISM: Primary | ICD-10-CM

## 2022-09-20 RX ORDER — LOSARTAN POTASSIUM 50 MG/1
TABLET ORAL
Qty: 90 TABLET | Refills: 0 | Status: SHIPPED | OUTPATIENT
Start: 2022-09-20

## 2022-09-20 RX ORDER — LEVOTHYROXINE SODIUM 0.07 MG/1
TABLET ORAL
Qty: 90 TABLET | Refills: 0 | Status: SHIPPED | OUTPATIENT
Start: 2022-09-20

## 2022-09-20 RX ORDER — VENLAFAXINE HYDROCHLORIDE 75 MG/1
CAPSULE, EXTENDED RELEASE ORAL
Qty: 90 CAPSULE | Refills: 0 | Status: SHIPPED | OUTPATIENT
Start: 2022-09-20

## 2022-11-29 ENCOUNTER — OFFICE VISIT (OUTPATIENT)
Dept: FAMILY MEDICINE CLINIC | Facility: CLINIC | Age: 52
End: 2022-11-29

## 2022-11-29 VITALS
WEIGHT: 139.6 LBS | SYSTOLIC BLOOD PRESSURE: 130 MMHG | OXYGEN SATURATION: 99 % | HEART RATE: 70 BPM | TEMPERATURE: 97.6 F | RESPIRATION RATE: 16 BRPM | BODY MASS INDEX: 24.73 KG/M2 | DIASTOLIC BLOOD PRESSURE: 80 MMHG | HEIGHT: 63 IN

## 2022-11-29 DIAGNOSIS — M75.02 ADHESIVE CAPSULITIS OF LEFT SHOULDER: ICD-10-CM

## 2022-11-29 DIAGNOSIS — R40.0 DAYTIME SLEEPINESS: ICD-10-CM

## 2022-11-29 DIAGNOSIS — Z00.00 ANNUAL PHYSICAL EXAM: Primary | ICD-10-CM

## 2022-11-29 NOTE — PATIENT INSTRUCTIONS

## 2022-11-29 NOTE — PROGRESS NOTES
Assessment/Plan:         Problem List Items Addressed This Visit    None  Visit Diagnoses     Adhesive capsulitis of left shoulder    -  Primary    Relevant Orders    Ambulatory Referral to Orthopedic Surgery    Daytime sleepiness        Relevant Orders    Ambulatory referral to Pulmonology            Subjective:      Patient ID: Eugene Paget is a 47 y o  female  Patient here for review of chronic medical problems and review of the labs and imaging if it is applicable  Currently has no specific complaints other than mentioned in the review of systems  Denies chest pain, SOB, cough, abdominal pain, nausea, vomiting, fever, chills, lightheadedness, dizziness,headache, tingling or numbness  No bowel or bladder problem  The following portions of the patient's history were reviewed and updated as appropriate:   Past Medical History:  She has a past medical history of Allergic rhinitis, Disease of thyroid gland, HL (hearing loss), Hypertension, Liver hemangioma, and Tinnitus  ,  _______________________________________________________________________  Medical Problems:  does not have any pertinent problems on file ,  _______________________________________________________________________  Past Surgical History:   has a past surgical history that includes Rectal prolapse repair; Knee surgery (Left, 12/2006); Rectal surgery; Colonoscopy (07/15/2022); and [place] tooth extraction  ,  _______________________________________________________________________  Family History:  family history includes Breast cancer (age of onset: 54) in her maternal grandmother; Dementia in her mother; Heart attack in her father; Hyperlipidemia in her father and mother; Hypertension in her father and mother; Migraines in her brother and sister; Skin cancer in her father; Stroke in her father ,  _______________________________________________________________________  Social History:   reports that she quit smoking about 28 years ago   Her smoking use included cigarettes  She smoked an average of  25 packs per day  She has never used smokeless tobacco  She reports current alcohol use  She reports that she does not use drugs  ,  _______________________________________________________________________  Allergies:  is allergic to sertraline     _______________________________________________________________________  Current Outpatient Medications   Medication Sig Dispense Refill   • ALPRAZolam (XANAX) 0 25 mg tablet Daily     • Camrese Lo 0 1-0 02 & 0 01 MG TABS TAKE ONE TABLET BY MOUTH EVERY DAY 91 tablet 2   • Cholecalciferol 125 MCG (5000 UT) capsule Daily     • Cyanocobalamin (VITAMIN B-12 PO) Take by mouth     • levothyroxine 75 mcg tablet TAKE ONE TABLET BY MOUTH EVERY DAY AS DIRECTED 90 tablet 0   • losartan (COZAAR) 50 mg tablet TAKE ONE TABLET BY MOUTH EVERY DAY AT BEDTIME 90 tablet 0   • ondansetron (ZOFRAN-ODT) 4 mg disintegrating tablet      • venlafaxine (EFFEXOR-XR) 75 mg 24 hr capsule TAKE ONE CAPSULE BY MOUTH EVERY DAY 90 capsule 0     No current facility-administered medications for this visit      _______________________________________________________________________  Review of Systems   Constitutional: Negative for chills, fatigue and fever  HENT: Negative for congestion, ear pain, rhinorrhea, sneezing and sore throat  Eyes: Negative for redness, itching and visual disturbance  Respiratory: Negative for cough, chest tightness and shortness of breath  Cardiovascular: Negative for chest pain, palpitations and leg swelling  Gastrointestinal: Negative for abdominal pain, blood in stool, diarrhea, nausea and vomiting  Endocrine: Negative for cold intolerance and heat intolerance  Genitourinary: Negative for dysuria, frequency and urgency  Musculoskeletal: Positive for arthralgias (left shoulder)  Negative for back pain and myalgias  Skin: Negative for color change and rash     Neurological: Negative for dizziness, weakness, light-headedness, numbness and headaches  Hematological: Does not bruise/bleed easily  Psychiatric/Behavioral: Negative for agitation, behavioral problems and confusion  Objective:  Vitals:    11/29/22 1455   BP: 140/90   BP Location: Left arm   Patient Position: Sitting   Cuff Size: Standard   Pulse: 70   Resp: 16   Temp: 97 6 °F (36 4 °C)   TempSrc: Temporal   SpO2: 99%   Weight: 63 3 kg (139 lb 9 6 oz)   Height: 5' 3" (1 6 m)     Body mass index is 24 73 kg/m²  Physical Exam  Vitals and nursing note reviewed  Constitutional:       General: She is not in acute distress  Appearance: Normal appearance  She is not ill-appearing, toxic-appearing or diaphoretic  HENT:      Head: Normocephalic and atraumatic  Right Ear: Tympanic membrane normal       Left Ear: Tympanic membrane normal       Nose: Nose normal  No congestion  Mouth/Throat:      Mouth: Mucous membranes are moist    Eyes:      General: No scleral icterus  Right eye: No discharge  Left eye: No discharge  Extraocular Movements: Extraocular movements intact  Conjunctiva/sclera: Conjunctivae normal       Pupils: Pupils are equal, round, and reactive to light  Cardiovascular:      Rate and Rhythm: Normal rate and regular rhythm  Pulses: Normal pulses  Heart sounds: Normal heart sounds  No murmur heard  No gallop  Pulmonary:      Effort: Pulmonary effort is normal  No respiratory distress  Breath sounds: Normal breath sounds  No wheezing, rhonchi or rales  Abdominal:      General: Abdomen is flat  Bowel sounds are normal  There is no distension  Palpations: Abdomen is soft  Tenderness: There is no abdominal tenderness  There is no guarding  Musculoskeletal:         General: No swelling or tenderness  Cervical back: Normal range of motion and neck supple  No rigidity  Comments: Limited ROM left shoulder   Lymphadenopathy:      Cervical: No cervical adenopathy  Skin:     General: Skin is warm  Capillary Refill: Capillary refill takes 2 to 3 seconds  Coloration: Skin is not jaundiced  Findings: No bruising or rash  Neurological:      General: No focal deficit present  Mental Status: She is alert and oriented to person, place, and time  Mental status is at baseline        Gait: Gait normal    Psychiatric:         Mood and Affect: Mood normal

## 2022-11-29 NOTE — PROGRESS NOTES
ADULT ANNUAL [facility] PRIMARY CARE    NAME: Toño URBAN  [address] [place] [address]/[employer]  AGE: 47 y o  SEX: female  : 1970     DATE: 2022     Assessment and Plan:     Problem List Items Addressed This Visit    None  Visit Diagnoses     Annual physical exam    -  Primary    Adhesive capsulitis of left shoulder        Relevant Orders    Ambulatory Referral to Orthopedic Surgery    Daytime sleepiness        Relevant Orders    Ambulatory referral to Pulmonology          Immunizations and preventive care screenings were discussed with patient today  Appropriate education was printed on patient's after visit summary  Counseling:  Exercise: the importance of regular exercise/physical activity was discussed  Recommend exercise 3-5 times per week for at least 30 minutes  Return in about 3 months (around 2023)  Chief Complaint:     Chief Complaint   Patient presents with   • Follow-up     3 month follow up   • Physical Exam     Annual physical      History of Present Illness:     Adult Annual Physical   Patient here for a comprehensive physical exam  The patient reports problems - left shoulder has limited ROM  Diet and Physical Activity  Diet/Nutrition: well balanced diet  Exercise: walking  Depression Screening  PHQ-2/9 Depression Screening    Little interest or pleasure in doing things: 0 - not at all  Feeling down, depressed, or hopeless: 0 - not at all  PHQ-2 Score: 0  PHQ-2 Interpretation: Negative depression screen       General Health  Sleep: sleeps well  Hearing: normal - bilateral   Vision: no vision problems  Dental: regular dental visits  /GYN Health  Patient is: postmenopausal  Last menstrual period:   Contraceptive method: none  Review of Systems:     Review of Systems   Constitutional: Negative for chills, fatigue and fever  HENT: Negative for congestion, ear pain, rhinorrhea, sneezing and sore throat  Eyes: Negative for redness, itching and visual disturbance  Respiratory: Negative for cough, chest tightness and shortness of breath  Cardiovascular: Negative for chest pain, palpitations and leg swelling  Gastrointestinal: Negative for abdominal pain, blood in stool, diarrhea, nausea and vomiting  Endocrine: Negative for cold intolerance and heat intolerance  Genitourinary: Negative for dysuria, frequency and urgency  Musculoskeletal: Positive for arthralgias (Left shoulder)  Negative for back pain and myalgias  Skin: Negative for color change and rash  Neurological: Negative for dizziness, weakness, light-headedness, numbness and headaches  Hematological: Does not bruise/bleed easily  Psychiatric/Behavioral: Negative for agitation, behavioral problems and confusion        Past Medical History:     Past Medical History:   Diagnosis Date   • Allergic rhinitis    • Disease of thyroid gland     hypothyroidism   • HL (hearing loss)    • Hypertension    • Liver hemangioma    • Tinnitus       Past Surgical History:     Past Surgical History:   Procedure Laterality Date   • COLONOSCOPY  07/15/2022   • KNEE SURGERY Left 2006   • RECTAL PROLAPSE REPAIR     • RECTAL SURGERY      prolapsed rectum repair x2   • WISDOM TOOTH EXTRACTION        Social History:     Social History     Socioeconomic History   • Marital status:      Spouse name: None   • Number of children: None   • Years of education: None   • Highest education level: None   Occupational History   • None   Tobacco Use   • Smoking status: Former     Packs/day: 0 25     Types: Cigarettes     Quit date:      Years since quittin 9   • Smokeless tobacco: Never   • Tobacco comments:     social cigarette use   Vaping Use   • Vaping Use: Never used   Substance and Sexual Activity   • Alcohol use: Yes     Comment: holiday only-twice/year   • Drug use: Never   • Sexual activity: Not Currently     Partners: Male     Birth control/protection: None   Other Topics Concern   • None   Social History Narrative   • None     Social Determinants of Health     Financial Resource Strain: Not on file   Food Insecurity: Not on file   Transportation Needs: Not on file   Physical Activity: Not on file   Stress: Not on file   Social Connections: Not on file   Intimate Partner Violence: Not on file   Housing Stability: Not on file      Family History:     Family History   Problem Relation Age of Onset   • Hypertension Mother    • Hyperlipidemia Mother    • Dementia Mother    • Heart attack Father         x2   • Hypertension Father    • Hyperlipidemia Father    • Stroke Father    • Skin cancer Father    • Migraines Sister    • Migraines Brother    • Breast cancer Maternal Grandmother 54      Current Medications:     Current Outpatient Medications   Medication Sig Dispense Refill   • ALPRAZolam (XANAX) 0 25 mg tablet Daily     • Camrese Lo 0 1-0 02 & 0 01 MG TABS TAKE ONE TABLET BY MOUTH EVERY DAY 91 tablet 2   • Cholecalciferol 125 MCG (5000 UT) capsule Daily     • Cyanocobalamin (VITAMIN B-12 PO) Take by mouth     • levothyroxine 75 mcg tablet TAKE ONE TABLET BY MOUTH EVERY DAY AS DIRECTED 90 tablet 0   • losartan (COZAAR) 50 mg tablet TAKE ONE TABLET BY MOUTH EVERY DAY AT BEDTIME 90 tablet 0   • ondansetron (ZOFRAN-ODT) 4 mg disintegrating tablet      • venlafaxine (EFFEXOR-XR) 75 mg 24 hr capsule TAKE ONE CAPSULE BY MOUTH EVERY DAY 90 capsule 0     No current facility-administered medications for this visit  Allergies: Allergies   Allergen Reactions   • Sertraline Hives      Physical Exam:     /80   Pulse 70   Temp 97 6 °F (36 4 °C) (Temporal)   Resp 16   Ht 5' 3" (1 6 m)   Wt 63 3 kg (139 lb 9 6 oz)   SpO2 99%   BMI 24 73 kg/m²     Physical Exam  Vitals and nursing note reviewed  Constitutional:       General: She is not in acute distress  Appearance: Normal appearance  She is well-developed     HENT:      Head: Normocephalic and atraumatic  Right Ear: External ear normal  There is no impacted cerumen  Left Ear: External ear normal  There is no impacted cerumen  Nose: Nose normal       Mouth/Throat:      Mouth: Mucous membranes are moist       Pharynx: Oropharynx is clear  Eyes:      Conjunctiva/sclera: Conjunctivae normal    Cardiovascular:      Rate and Rhythm: Normal rate and regular rhythm  Pulses: Normal pulses  Heart sounds: Normal heart sounds  No murmur heard  Pulmonary:      Effort: Pulmonary effort is normal  No respiratory distress  Breath sounds: Normal breath sounds  No wheezing  Abdominal:      General: Abdomen is flat  Bowel sounds are normal       Palpations: Abdomen is soft  Tenderness: There is no abdominal tenderness  Musculoskeletal:         General: No tenderness  Cervical back: Normal range of motion and neck supple  No rigidity  Comments: Limited range of motion left shoulder   Lymphadenopathy:      Cervical: No cervical adenopathy  Skin:     General: Skin is warm and dry  Capillary Refill: Capillary refill takes 2 to 3 seconds  Neurological:      General: No focal deficit present  Mental Status: She is alert and oriented to person, place, and time  Mental status is at baseline        Gait: Gait normal    Psychiatric:         Mood and Affect: Mood normal          Behavior: Behavior normal           Jenne Councilman, MD  [facility] [address] [facility]

## 2022-12-13 ENCOUNTER — TELEPHONE (OUTPATIENT)
Dept: FAMILY MEDICINE CLINIC | Facility: CLINIC | Age: 52
End: 2022-12-13

## 2022-12-13 ENCOUNTER — TELEMEDICINE (OUTPATIENT)
Dept: FAMILY MEDICINE CLINIC | Facility: CLINIC | Age: 52
End: 2022-12-13

## 2022-12-13 DIAGNOSIS — U07.1 COVID-19: Primary | ICD-10-CM

## 2022-12-13 NOTE — LETTER
December 13, 2022     Patient: Jodi Rosas  YOB: 1970  Date of Visit: 12/13/2022      To Whom it May Concern: Leelee Hari is under my professional care  Ira Harrison was seen in my office on 12/13/2022  Ira Harrison may return to work on 12/19/2022  If you have any questions or concerns, please don't hesitate to call           Sincerely,          Xander Boyd MD        CC: No Recipients

## 2022-12-13 NOTE — TELEPHONE ENCOUNTER
Hi, it's Gina Hortaems  Date of birth January 24th, 1970  Needed to schedule perhaps an appointment  My tested positive for COVID this morning and I'm going to need a note to return to work  So can you give me a call back [phone]? Nope  I'm sorry, [phone]  Thank you      Please advise

## 2022-12-13 NOTE — PROGRESS NOTES
COVID-19 Outpatient Progress Note    Assessment/Plan:    Problem List Items Addressed This Visit    None  Visit Diagnoses     COVID-19    -  Primary    Relevant Medications    molnupiravir 200 mg capsule         Disposition:     Discussed symptom directed medication options with patient  Discussed vitamin D, vitamin C, and/or zinc supplementation with patient  Discussed risks, benefits, and side effects of inhaled corticosteroids and they agree to treatment  Patient meets criteria for Molnupiravir and they have been counseled according to EUA documentation provided by the FDA  After discussion, patient agrees to treatment  Shahram Hoops is an investigational medicine used to treat mild-to-moderate COVID-19 in adults with positive results of direct SARS-CoV-2 viral testing, and who are at high risk for progressing to severe COVID-19 including hospitalization or death, and for whom other COVID-19 treatment options authorized by the FDA are not accessible or clinically appropriate  The FDA has authorized the emergency use of molnupiravir for the treatment of mild-tomoderate COVID-19 in adults under an EUA  Shahram Hoops is not authorized:  - for use in people less than 25years of age   - for prevention of COVID-19   - for people needing hospitalization for COVID-19   - for use for longer than 5 consecutive days    What is the most important information I should know about molnupiravir? Shahram Hoops may cause serious side effects, including:    Shahram Hoops may cause harm to your unborn baby  It is not known if molnupiravir will harm your baby if you take molnupiravir during pregnancy  - Shahram Hoops is not recommended for use in pregnancy  - Shahram Hoops has not been studied in pregnancy  Shahram Hoops was studied in pregnant animals only   When molnupiravir was given to pregnant animals, molnupiravir caused harm to their unborn babies   - You and your healthcare provider may decide that you should take molnupiravir duringpregnancy if there are no other COVID-19 treatment options authorized by the FDA that are accessible or clinically appropriate for you  - If you and your healthcare provider decide that you should take molnupiravir during pregnancy, you and your healthcare provider should discuss the known and potential benefits and the potential risks of taking molnupiravir during pregnancy  For individuals who are able to become pregnant:  - You should use a reliable method of birth control (contraception) consistently and correctly during treatment with molnupiravir and for 4 days after the last dose of molnupiravir  Talk to your healthcare provider about reliable birth control methods  - Before starting treatment with molnupiravir your healthcare provider may do a pregnancy test to see if you are pregnant before starting treatment with molnupiravir  - Tell your healthcare provider right away if you become pregnant or think you may be pregnant during treatment with molnupiravir  Pregnancy Surveillance Program:  - There is a pregnancy surveillance program for individuals who take molnupiravir during pregnancy  The purpose of this program is to collect information about the health of you and your baby  Talk to your healthcare provider about how to take part in this program   - If you take molnupiravir during pregnancy and you agree to participate in the pregnancy surveillance program and allow your healthcare provider to share your information with Katiana Raoz, then your healthcare provider will report your use of molnupiravir during pregnancy to [address] [place] [address],[address]  by calling [phone] or pregnancyreporting msd com  For individuals who are sexually active with partners who are able to become pregnant:  - It is not known if molnupiravir can affect sperm   While the risk is regarded as low, animal studies to fully assess the potential for molnupiravir to affect the babies of males treated with molnupiravir have not been completed  A reliable method of birth control (contraception) should be used consistently and correctly during treatment with molnupiravir and for at least 3 months after the last dose  The risk to sperm beyond 3 months is not known  Studies to understand the risk to sperm beyond 3 months are ongoing  Talk to your healthcare provider about reliable birth control methods  Talk to your healthcare provider if you have questions or concerns about how molnupiravir may affect sperm  How do I take molnupiravir? - Take molnupiravir exactly as your healthcare provider tells you to take it  - Take 4 capsules of molnupiravir every 12 hours (for example, at 8 am and at 8 pm)  - Take molnupiravir for 5 days  It is important that you complete the full 5 days of treatment with molnupiravir  Do not stop taking molnupiravir before you complete the full 5 days of treatment, even if you feel better  - Take molnupiravir with or without food  - You should stay in isolation for as long as your healthcare provider tells you to  Talk to your healthcare provider if you are not sure about how to properly isolate while you have COVID-19   - Swallow molnupiravir capsules whole  Do not open, break, or crush the capsules  If you cannot swallow capsules whole, tell your healthcare provider  What to do if you miss a dose:  - If it has been less than 10 hours since the missed dose, take it as soon as you remember  - If it has been more than 10 hours since the missed dose, skip the missed dose and take your dose at the next scheduled time  - Do not double the dose of molnupiravir to make up for a missed dose  What are the important possible side effects of molnupiravir?     Possible side effects of molnupiravir are:  - See, Rella Real is the most important information I should know about molnupiravir?”  - Diarrhea  - Nausea  - Dizziness    These are not all the possible side effects of molnupiravir  Not many people have taken molnupiravir  Serious and unexpected side effects may happen  This medicine is still being studied, so it is possible that all of the risks are not known at this time  What other treatment choices are there? Like Amairani Spurling may allow for the emergency use of other medicines to treat people with COVID-19  Go to https://Gogo/ for more information  It is your choice to be treated or not to be treated with molnupiravir  Should you decide not to take it, it will not change your standard medical care  What if I am breastfeeding? Breastfeeding is not recommended during treatment with molnupiravir and for 4 days after the last dose of molnupiravir  If you are breastfeeding or plan to breastfeed, talk to your healthcare provider about your options and specific situation before taking molnupiravir  How do I report side effects with molnupiravir? Contact your healthcare provider if you have any side effects that bother you or do not go away  Report side effects to FDA MedWatch at www fda gov/medDealerTrack or call 0-568-LXN-8979 (1-693.660.4995)  How should I store Trenia Cords? - Store molnupiravir capsules at room temperature between 68°F to 77°F (20°C to 25°C)  - Keep molnupiravir and all medicines out of the reach of children and pets  Full fact sheet for patients, parents, and caregivers can be found at: [employer] au    I have spent 20 minutes directly with the patient  Greater than 50% of this time was spent in counseling/coordination of care regarding: diagnostic results, prognosis, risks and benefits of treatment options, instructions for management and patient and family education         Encounter provider: Alec Delaney MD     Provider located at: [place] [address] [facility] [address]  [phone]     Recent Visits  No visits were found meeting these conditions  Showing recent visits within past 7 days and meeting all other requirements  Today's Visits  Date Type Provider Dept   12/13/22 Telemedicine Jignesh [facility]   12/13/22 Telephone MD Cordelia Miller [facility]   Showing today's visits and meeting all other requirements  Future Appointments  No visits were found meeting these conditions  Showing future appointments within next 150 days and meeting all other requirements     My office door was closed  No one else was in the room  Katelyn Young acknowledged consent and understanding of privacy and security of the telemedicine visit  I informed the patient that I have reviewed her record in Epic and presented the opportunity for her to ask any questions regarding the visit today  The patient agreed to participate  Verification of patient location:  Patient is located in the following state in which I hold an active license: [place]    Subjective: Katelyn Young is a 47 y o  female who is concerned about COVID-19  Patient's symptoms include fever, nasal congestion, sore throat and cough  Patient denies chills, fatigue, rhinorrhea, shortness of breath, chest tightness, abdominal pain, nausea, vomiting, diarrhea, myalgias and headaches           COVID-19 vaccination status: Fully vaccinated with booster    Exposure:   Contact with a person who is under investigation (PUI) for or who is positive for COVID-19 within the last 14 days?: No    Hospitalized recently for fever and/or lower respiratory symptoms?: No      Currently a healthcare worker that is involved in direct patient care?: No      Works in a special setting where the risk of COVID-19 transmission may be high? (this may include long-term care, correctional and group home facilities; homeless shelters; assisted-living facilities and group homes ): No      Resident in a special setting where the risk of COVID-19 transmission may be high? (this may include long-term care, correctional and retirement facilities; homeless shelters; assisted-living facilities and group homes ): No      No results found for: SARSCOV2, [address] [facility], [address] [facility], [facility] [address], [address] [facility], [address] [facility]    Review of Systems   Constitutional: Positive for fever  Negative for chills and fatigue  HENT: Positive for congestion and sore throat  Negative for ear pain, rhinorrhea and sneezing  Eyes: Negative for redness, itching and visual disturbance  Respiratory: Positive for cough  Negative for chest tightness and shortness of breath  Cardiovascular: Negative for chest pain, palpitations and leg swelling  Gastrointestinal: Negative for abdominal pain, blood in stool, diarrhea, nausea and vomiting  Endocrine: Negative for cold intolerance and heat intolerance  Genitourinary: Negative for dysuria, frequency and urgency  Musculoskeletal: Negative for arthralgias, back pain and myalgias  Skin: Negative for color change and rash  Neurological: Negative for dizziness, weakness, light-headedness, numbness and headaches  Hematological: Does not bruise/bleed easily  Psychiatric/Behavioral: Negative for agitation, behavioral problems and confusion       Current Outpatient Medications on File Prior to Visit   Medication Sig   • ALPRAZolam (XANAX) 0 25 mg tablet Daily   • Camrese Lo 0 1-0 02 & 0 01 MG TABS TAKE ONE TABLET BY MOUTH EVERY DAY   • Cholecalciferol 125 MCG (5000 UT) capsule Daily   • Cyanocobalamin (VITAMIN B-12 PO) Take by mouth   • levothyroxine 75 mcg tablet TAKE ONE TABLET BY MOUTH EVERY DAY AS DIRECTED   • losartan (COZAAR) 50 mg tablet TAKE ONE TABLET BY MOUTH EVERY DAY AT BEDTIME   • ondansetron (ZOFRAN-ODT) 4 mg disintegrating tablet    • venlafaxine (EFFEXOR-XR) 75 mg 24 hr capsule TAKE ONE CAPSULE BY MOUTH EVERY DAY Objective: There were no vitals taken for this visit  Physical Exam  Constitutional:       General: She is not in acute distress  Appearance: Normal appearance  She is not ill-appearing, toxic-appearing or diaphoretic  Pulmonary:      Effort: Pulmonary effort is normal    Neurological:      Mental Status: She is alert     Psychiatric:         Mood and Affect: Mood normal          Behavior: Behavior normal        Quentin Mata MD

## 2022-12-19 DIAGNOSIS — E03.4 IDIOPATHIC ATROPHIC HYPOTHYROIDISM: ICD-10-CM

## 2022-12-19 DIAGNOSIS — F41.1 GENERALIZED ANXIETY DISORDER: ICD-10-CM

## 2022-12-19 DIAGNOSIS — I10 PRIMARY HYPERTENSION: ICD-10-CM

## 2022-12-19 RX ORDER — VENLAFAXINE HYDROCHLORIDE 75 MG/1
CAPSULE, EXTENDED RELEASE ORAL
Qty: 90 CAPSULE | Refills: 0 | Status: SHIPPED | OUTPATIENT
Start: 2022-12-19

## 2022-12-19 RX ORDER — LEVOTHYROXINE SODIUM 0.07 MG/1
TABLET ORAL
Qty: 90 TABLET | Refills: 0 | Status: SHIPPED | OUTPATIENT
Start: 2022-12-19

## 2022-12-19 RX ORDER — LOSARTAN POTASSIUM 50 MG/1
TABLET ORAL
Qty: 90 TABLET | Refills: 0 | Status: SHIPPED | OUTPATIENT
Start: 2022-12-19

## 2023-01-05 ENCOUNTER — OFFICE VISIT (OUTPATIENT)
Dept: OBGYN CLINIC | Facility: CLINIC | Age: 53
End: 2023-01-05

## 2023-01-05 VITALS
HEIGHT: 63 IN | HEART RATE: 66 BPM | SYSTOLIC BLOOD PRESSURE: 136 MMHG | BODY MASS INDEX: 24.52 KG/M2 | WEIGHT: 138.4 LBS | DIASTOLIC BLOOD PRESSURE: 85 MMHG

## 2023-01-05 DIAGNOSIS — M75.02 ADHESIVE CAPSULITIS OF LEFT SHOULDER: ICD-10-CM

## 2023-01-05 DIAGNOSIS — M75.102 ROTATOR CUFF SYNDROME, LEFT: Primary | ICD-10-CM

## 2023-01-05 NOTE — PROGRESS NOTES
Assessment:       1  Rotator cuff syndrome, left    2  Adhesive capsulitis of left shoulder          Plan:        I had a detailed discussion with the patient with regards to her persistent left shoulder pain and weakness  Clinical evaluation is consistent with left shoulder adhesive capsulitis and I also have suspicion of potential underlying rotator cuff disorder  Due to her prolonged symptoms despite conservative management I will request an MRI of the left shoulder for further evaluation  I will see her back following this assessment  I did explain to her the pathophysiology of adhesive capsulitis and explained that symptom resolution may take over 12 to 16 months  Encouraged to continue with left shoulder range of motion exercises  If the left shoulder MRI does not reveal any significant rotator cuff tear we could consider doing ultrasound-guided left subacromial cortisone injection along with left glenohumeral cortisone injection for symptomatic relief  Patient has expressed understanding and is in agreement with this plan of management  Subjective:     Patient ID: Keith Garcia is a 47 y o  female  Chief Complaint: Left shoulder pain, stiffness and weakness    HPI  Princess Nielsen is a right-hand-dominant lady who presents today with chronic left shoulder pain  She has been referred in this regard by her primary care physician Dr Ema Soler MD   Per the patient, symptom onset was in March 2022  Symptoms were nontraumatic in onset  She describes the pain to be present diffusely in the left shoulder but worse on the lateral aspect  This is associated with weakness and stiffness of the left shoulder  She has already been evaluated in this regard by Dr Loretta Padgett on 8/29/2022 and received a left subacromial cortisone injection  This provided her few weeks of relief but symptoms recurred    A plain radiograph of the left shoulder was also performed on this office visit and this did not reveal any acute osseous injury or significant degenerative changes  Patient has been consistently doing rehabilitation exercises and has also tried oral pain medications without much relief  She does not have a known history of diabetes mellitus  Patient denies any significant neck pain at this time  Social History     Occupational History   • Not on file   Tobacco Use   • Smoking status: Former     Packs/day: 0 25     Types: Cigarettes     Quit date:      Years since quittin 0   • Smokeless tobacco: Never   • Tobacco comments:     social cigarette use   Vaping Use   • Vaping Use: Never used   Substance and Sexual Activity   • Alcohol use: Yes     Comment: holiday only-twice/year   • Drug use: Never   • Sexual activity: Not Currently     Partners: Male     Birth control/protection: None      Review of Systems        Objective:     Left Shoulder Exam     Tenderness   Left shoulder tenderness location: Tender to palpation over the anterior joint line as well as the subacromial space  Range of Motion   Active abduction: 80   External rotation: 30   Forward flexion: 130   Internal rotation 0 degrees: Sacrum     Muscle Strength   Abduction: 4/5   Internal rotation: 4/5   External rotation: 4/5   Supraspinatus: 4/5   Subscapularis: 4/5   Biceps: 4/5     Tests   Apprehension: negative  Mejia test: positive  Cross arm: negative  Impingement: positive  Drop arm: negative    Other   Sensation: normal     Comments:  No cervical midline tenderness noted  Good range of cervical spine motion in all directions  No focal neurological deficits noted of the upper extremities  Physical Exam  Vitals and nursing note reviewed  Constitutional:       Appearance: She is well-developed  HENT:      Head: Normocephalic and atraumatic  Eyes:      Conjunctiva/sclera: Conjunctivae normal       Pupils: Pupils are equal, round, and reactive to light     Cardiovascular:      Rate and Rhythm: Normal rate and regular rhythm  Pulmonary:      Effort: Pulmonary effort is normal  No respiratory distress  Skin:     General: Skin is warm and dry  Findings: No erythema  Neurological:      Mental Status: She is alert and oriented to person, place, and time  Cranial Nerves: No cranial nerve deficit  Psychiatric:         Behavior: Behavior normal          Thought Content: Thought content normal          Judgment: Judgment normal            I have personally reviewed pertinent films in PACS and my interpretation is As noted above in the HPI

## 2023-01-16 ENCOUNTER — CONSULT (OUTPATIENT)
Dept: PULMONOLOGY | Facility: CLINIC | Age: 53
End: 2023-01-16

## 2023-01-16 VITALS
HEIGHT: 63 IN | HEART RATE: 55 BPM | OXYGEN SATURATION: 98 % | WEIGHT: 140.4 LBS | BODY MASS INDEX: 24.88 KG/M2 | SYSTOLIC BLOOD PRESSURE: 128 MMHG | DIASTOLIC BLOOD PRESSURE: 78 MMHG

## 2023-01-16 DIAGNOSIS — R40.0 DAYTIME SLEEPINESS: ICD-10-CM

## 2023-01-16 DIAGNOSIS — G47.33 OSA (OBSTRUCTIVE SLEEP APNEA): Primary | ICD-10-CM

## 2023-01-16 NOTE — ASSESSMENT & PLAN NOTE
She has excessive daytime sleepiness and her Mabie sleepiness score is 11 out of 24  This could be related to sleep apnea  She did not have any significant features of narcolepsy

## 2023-01-16 NOTE — PROGRESS NOTES
Assessment/Plan:    AMY (obstructive sleep apnea)  Ms Kodak Mancuso has excessive daytime sleepiness interrupted sleep and wakes up during sleep gasping for air  She also feels sleepy and tired during the day occasionally  On clinical examination, she had oropharyngeal crowding  She likely has significant obstructive sleep apnea and needs an overnight home sleep study to confirm it and start treatment  I had a long discussion with her and answered all her questions  I have advised her regarding driving use of alcohol and sedative medications  Daytime sleepiness  She has excessive daytime sleepiness and her Fraser sleepiness score is 11 out of 24  This could be related to sleep apnea  She did not have any significant features of narcolepsy  Diagnoses and all orders for this visit:    AMY (obstructive sleep apnea)  -     Home Study; Future    Daytime sleepiness  -     Ambulatory referral to Pulmonology          Subjective:      Patient ID: Randa Mohan is a 47 y o  female  Hope Catalina has history of excessive daytime sleepiness since about 2 years  She is normally sleeps for about 10 years but still feels sleepy and occasionally tired during the day  She occasionally wakes up gasping for air  This got better after she started using allergy medication  No reported apneas  No history suggestive of cataplexy sleep paralysis or hallucinations  She has not been sleep tested before  She has history of anxiety and uses Xanax occasionally  She has a history of hypertension and has been on treatment  She denied any shortness of breath cough or phlegm or wheeze or chest pain  She has no problems driving  She has no history of diabetes or heart problems or cardiac arrhythmias        The following portions of the patient's history were reviewed and updated as appropriate: allergies, current medications, past family history, past medical history, past social history, past surgical history and problem list     Review of Systems   Constitutional: Positive for fatigue  Negative for appetite change, chills and fever  HENT: Positive for rhinorrhea  Negative for hearing loss, sneezing and trouble swallowing  Eyes: Negative for visual disturbance  Respiratory: Negative for cough, choking, chest tightness and shortness of breath  Cardiovascular: Negative for chest pain, palpitations and leg swelling  Gastrointestinal: Negative for abdominal pain, constipation, diarrhea and vomiting  Genitourinary: Negative for dysuria, frequency and urgency  Musculoskeletal: Negative for arthralgias and joint swelling  Skin: Negative for rash  Allergic/Immunologic: Positive for environmental allergies  Neurological: Negative for dizziness, seizures, syncope, light-headedness and headaches  Psychiatric/Behavioral: Negative for agitation, confusion and sleep disturbance  Objective:      /78   Pulse 55   Ht 5' 3" (1 6 m)   Wt 63 7 kg (140 lb 6 4 oz)   SpO2 98%   BMI 24 87 kg/m²          Physical Exam  Vitals reviewed  Constitutional:       General: She is not in acute distress  Appearance: She is obese  She is not ill-appearing, toxic-appearing or diaphoretic  HENT:      Head: Normocephalic  Mouth/Throat:      Mouth: Mucous membranes are moist       Pharynx: Oropharynx is clear  Comments: Oropharyngeal crowding; Mallampatti Class3  Eyes:      General: No scleral icterus  Conjunctiva/sclera: Conjunctivae normal    Cardiovascular:      Rate and Rhythm: Normal rate and regular rhythm  Heart sounds: Normal heart sounds  No murmur heard  Pulmonary:      Effort: Pulmonary effort is normal  No respiratory distress  Breath sounds: Normal breath sounds  No stridor  No wheezing, rhonchi or rales  Chest:      Chest wall: No tenderness  Abdominal:      General: Bowel sounds are normal       Palpations: Abdomen is soft  Tenderness:  There is no abdominal tenderness  There is no guarding  Musculoskeletal:      Cervical back: No rigidity  Right lower leg: No edema  Left lower leg: No edema  Lymphadenopathy:      Cervical: No cervical adenopathy  Skin:     Coloration: Skin is not jaundiced or pale  Findings: No rash  Neurological:      Mental Status: She is alert and oriented to person, place, and time  Gait: Gait normal    Psychiatric:         Mood and Affect: Mood normal          Behavior: Behavior normal          Thought Content:  Thought content normal          Judgment: Judgment normal

## 2023-01-16 NOTE — ASSESSMENT & PLAN NOTE
Aleena Renee has excessive daytime sleepiness interrupted sleep and wakes up during sleep gasping for air  She also feels sleepy and tired during the day occasionally  On clinical examination, she had oropharyngeal crowding  She likely has significant obstructive sleep apnea and needs an overnight home sleep study to confirm it and start treatment  I had a long discussion with her and answered all her questions  I have advised her regarding driving use of alcohol and sedative medications

## 2023-01-20 ENCOUNTER — HOSPITAL ENCOUNTER (OUTPATIENT)
Dept: MRI IMAGING | Facility: HOSPITAL | Age: 53
End: 2023-01-20

## 2023-01-20 DIAGNOSIS — M75.02 ADHESIVE CAPSULITIS OF LEFT SHOULDER: ICD-10-CM

## 2023-01-20 DIAGNOSIS — M75.102 ROTATOR CUFF SYNDROME, LEFT: ICD-10-CM

## 2023-02-02 ENCOUNTER — OFFICE VISIT (OUTPATIENT)
Dept: OBGYN CLINIC | Facility: CLINIC | Age: 53
End: 2023-02-02

## 2023-02-02 VITALS
HEIGHT: 63 IN | SYSTOLIC BLOOD PRESSURE: 136 MMHG | DIASTOLIC BLOOD PRESSURE: 86 MMHG | WEIGHT: 138.89 LBS | BODY MASS INDEX: 24.61 KG/M2 | HEART RATE: 78 BPM

## 2023-02-02 DIAGNOSIS — M75.02 ADHESIVE CAPSULITIS OF LEFT SHOULDER: Primary | ICD-10-CM

## 2023-02-02 DIAGNOSIS — M25.512 CHRONIC LEFT SHOULDER PAIN: ICD-10-CM

## 2023-02-02 DIAGNOSIS — G89.29 CHRONIC LEFT SHOULDER PAIN: ICD-10-CM

## 2023-02-11 NOTE — PROGRESS NOTES
Assessment:       1  Adhesive capsulitis of left shoulder    2  Chronic left shoulder pain          Plan:        Explained my current clinical findings and reviewed radiological findings with the patient today  She was agreeable to a landmark guided left glenohumeral cortisone injection which was provided on today's office visit without any immediate complications  I have also made a referral to physical therapy to help improve the left shoulder range of motion and strength  We will follow-up in about 3 months time for clinical reassessment and discharged  Patient has expressed understanding and is in agreement with the treatment plan  Subjective:     Patient ID: Tom Harris is a 47 y o  female  Chief Complaint:    CLARITZA Gonzalez is here for a f/u of her left shoulder pain  Last seen in this regard on 2023  Clinical evaluation was suspicious of left shoulder adhesive capsulitis and potential underlying rotator cuff disorder  Subsequently an MRI of the left shoulder was obtained on 2023  This reveals mild long head of the biceps tenosynovitis without a tear  There is no evidence of left shoulder rotator cuff tear  Patient continues to experience left shoulder stiffness with pain  Denies any new injury  Social History     Occupational History   • Not on file   Tobacco Use   • Smoking status: Former     Packs/day: 0 25     Types: Cigarettes     Quit date:      Years since quittin 1   • Smokeless tobacco: Never   • Tobacco comments:     social cigarette use   Vaping Use   • Vaping Use: Never used   Substance and Sexual Activity   • Alcohol use: Yes     Comment: holiday only-twice/year   • Drug use: Never   • Sexual activity: Not Currently     Partners: Male     Birth control/protection: None      Review of Systems        Objective:     Left Shoulder Exam     Tenderness   The patient is experiencing tenderness in the biceps tendon      Range of Motion   Active abduction: 80 External rotation: 40   Forward flexion: 120   Internal rotation 0 degrees: Sacrum     Muscle Strength   Left shoulder normal muscle strength: Strength testing is limited due to pain  Abduction: 4/5   Internal rotation: 4/5   External rotation: 4/5   Supraspinatus: 4/5   Subscapularis: 4/5   Biceps: 4/5     Tests   Apprehension: negative  Mejia test: positive  Cross arm: negative  Drop arm: negative    Other   Erythema: absent  Sensation: normal         Physical Exam  Vitals and nursing note reviewed  Constitutional:       Appearance: She is well-developed  HENT:      Head: Normocephalic and atraumatic  Eyes:      Conjunctiva/sclera: Conjunctivae normal       Pupils: Pupils are equal, round, and reactive to light  Cardiovascular:      Rate and Rhythm: Normal rate  Pulmonary:      Effort: Pulmonary effort is normal  No respiratory distress  Skin:     General: Skin is warm and dry  Findings: No erythema  Neurological:      Mental Status: She is alert and oriented to person, place, and time  Cranial Nerves: No cranial nerve deficit  Psychiatric:         Behavior: Behavior normal          Thought Content: Thought content normal          Judgment: Judgment normal              I have personally reviewed pertinent films in PACS  Large joint arthrocentesis: L glenohumeral  Universal Protocol:  Consent: Verbal consent obtained  Risks and benefits: risks, benefits and alternatives were discussed  Consent given by: patient  Patient understanding: patient states understanding of the procedure being performed  Site marked: the operative site was marked  Radiology Images displayed and confirmed   If images not available, report reviewed: imaging studies available  Required items: required blood products, implants, devices, and special equipment available  Patient identity confirmed: verbally with patient    Supporting Documentation  Indications: pain   Procedure Details  Location: shoulder - L glenohumeral  Needle size: 22 G  Ultrasound guidance: no  Approach: posterior  Medications administered: 4 mL bupivacaine 0 25 %; 40 mg triamcinolone acetonide 40 mg/mL    Patient tolerance: patient tolerated the procedure well with no immediate complications  Dressing:  Sterile dressing applied

## 2023-02-12 RX ORDER — BUPIVACAINE HYDROCHLORIDE 2.5 MG/ML
4 INJECTION, SOLUTION INFILTRATION; PERINEURAL
Status: COMPLETED | OUTPATIENT
Start: 2023-02-12 | End: 2023-02-12

## 2023-02-12 RX ORDER — TRIAMCINOLONE ACETONIDE 40 MG/ML
40 INJECTION, SUSPENSION INTRA-ARTICULAR; INTRAMUSCULAR
Status: COMPLETED | OUTPATIENT
Start: 2023-02-12 | End: 2023-02-12

## 2023-02-12 RX ADMIN — TRIAMCINOLONE ACETONIDE 40 MG: 40 INJECTION, SUSPENSION INTRA-ARTICULAR; INTRAMUSCULAR at 07:24

## 2023-02-12 RX ADMIN — BUPIVACAINE HYDROCHLORIDE 4 ML: 2.5 INJECTION, SOLUTION INFILTRATION; PERINEURAL at 07:24

## 2023-03-13 ENCOUNTER — OFFICE VISIT (OUTPATIENT)
Dept: URGENT CARE | Facility: CLINIC | Age: 53
End: 2023-03-13

## 2023-03-13 VITALS
SYSTOLIC BLOOD PRESSURE: 128 MMHG | OXYGEN SATURATION: 100 % | HEART RATE: 80 BPM | TEMPERATURE: 98.7 F | RESPIRATION RATE: 16 BRPM | DIASTOLIC BLOOD PRESSURE: 80 MMHG

## 2023-03-13 DIAGNOSIS — J01.10 ACUTE NON-RECURRENT FRONTAL SINUSITIS: Primary | ICD-10-CM

## 2023-03-13 RX ORDER — AMOXICILLIN AND CLAVULANATE POTASSIUM 875; 125 MG/1; MG/1
1 TABLET, FILM COATED ORAL EVERY 12 HOURS SCHEDULED
Qty: 14 TABLET | Refills: 0 | Status: SHIPPED | OUTPATIENT
Start: 2023-03-13 | End: 2023-03-20

## 2023-03-13 RX ORDER — BENZONATATE 100 MG/1
100 CAPSULE ORAL 3 TIMES DAILY PRN
Qty: 20 CAPSULE | Refills: 0 | Status: SHIPPED | OUTPATIENT
Start: 2023-03-13

## 2023-03-13 RX ORDER — FLUTICASONE PROPIONATE 50 MCG
2 SPRAY, SUSPENSION (ML) NASAL DAILY
COMMUNITY

## 2023-03-13 NOTE — ASSESSMENT & PLAN NOTE
History and exam findings consistent with sinusitis and cough secondary to post-nasal drip  Rx written for Augmentin and Tessalon pearles  Symptomatic care recommended as well and reasons to follow up reviewed with pt  All questions answered

## 2023-03-13 NOTE — PATIENT INSTRUCTIONS
Take Augmentin as prescribed, take with food  Take Tessalon as needed for cough  Caution- this can be sedating  Swallow whole, do not crush or chew  Continue Flonase nasal spray daily  Increase your water intake and use nasal saline rinses  Recommend Mucinex as needed  Follow up with your PCP or return to the clinic if symptoms worsen or persist more than 3-5 days

## 2023-03-27 DIAGNOSIS — F41.1 GENERALIZED ANXIETY DISORDER: ICD-10-CM

## 2023-03-27 DIAGNOSIS — E03.4 IDIOPATHIC ATROPHIC HYPOTHYROIDISM: ICD-10-CM

## 2023-03-27 DIAGNOSIS — I10 PRIMARY HYPERTENSION: ICD-10-CM

## 2023-03-27 RX ORDER — LOSARTAN POTASSIUM 50 MG/1
TABLET ORAL
Qty: 90 TABLET | Refills: 0 | Status: SHIPPED | OUTPATIENT
Start: 2023-03-27

## 2023-03-27 RX ORDER — VENLAFAXINE HYDROCHLORIDE 75 MG/1
CAPSULE, EXTENDED RELEASE ORAL
Qty: 90 CAPSULE | Refills: 0 | Status: SHIPPED | OUTPATIENT
Start: 2023-03-27

## 2023-03-27 RX ORDER — LEVOTHYROXINE SODIUM 0.07 MG/1
TABLET ORAL
Qty: 90 TABLET | Refills: 0 | Status: SHIPPED | OUTPATIENT
Start: 2023-03-27

## 2023-03-30 ENCOUNTER — OFFICE VISIT (OUTPATIENT)
Dept: FAMILY MEDICINE CLINIC | Facility: CLINIC | Age: 53
End: 2023-03-30

## 2023-03-30 VITALS
TEMPERATURE: 97.8 F | HEIGHT: 63 IN | SYSTOLIC BLOOD PRESSURE: 130 MMHG | OXYGEN SATURATION: 98 % | BODY MASS INDEX: 24.45 KG/M2 | RESPIRATION RATE: 16 BRPM | WEIGHT: 138 LBS | DIASTOLIC BLOOD PRESSURE: 80 MMHG | HEART RATE: 68 BPM

## 2023-03-30 DIAGNOSIS — E78.2 MIXED HYPERLIPIDEMIA: ICD-10-CM

## 2023-03-30 DIAGNOSIS — F41.1 GENERALIZED ANXIETY DISORDER: ICD-10-CM

## 2023-03-30 DIAGNOSIS — J30.1 NON-SEASONAL ALLERGIC RHINITIS DUE TO POLLEN: ICD-10-CM

## 2023-03-30 DIAGNOSIS — E55.9 VITAMIN D DEFICIENCY: ICD-10-CM

## 2023-03-30 DIAGNOSIS — E03.4 IDIOPATHIC ATROPHIC HYPOTHYROIDISM: ICD-10-CM

## 2023-03-30 DIAGNOSIS — I10 PRIMARY HYPERTENSION: Primary | ICD-10-CM

## 2023-03-30 PROBLEM — J01.10 ACUTE NON-RECURRENT FRONTAL SINUSITIS: Status: RESOLVED | Noted: 2023-03-13 | Resolved: 2023-03-30

## 2023-03-30 NOTE — PROGRESS NOTES
Assessment/Plan:         Problem List Items Addressed This Visit        Endocrine    Idiopathic atrophic hypothyroidism     Under control  Continue current medication  We will re-evaluate at next office visit  Relevant Orders    TSH, 3rd generation with Free T4 reflex       Respiratory    Allergic rhinitis due to pollen     Under control  Continue current medication  We will re-evaluate at next office visit  Cardiovascular and Mediastinum    Hypertension - Primary     Under control  Continue current medication  We will re-evaluate at next office visit  Relevant Orders    CBC and differential    Comprehensive metabolic panel    UA w Reflex to Microscopic w Reflex to Culture       Other    Generalized anxiety disorder     Under control  Continue current medication  We will re-evaluate at next office visit  Vitamin D deficiency    Relevant Orders    Vitamin D 25 hydroxy    Hyperlipidemia     Under control  Continue current medication  We will re-evaluate at next office visit  Relevant Orders    Lipid Panel with Direct LDL reflex         Subjective:      Patient ID: Teo Rdz is a 47 y o  female  Patient here for review of chronic medical problems and review of the labs and imaging if it is applicable  Currently has no specific complaints other than mentioned in the review of systems  Denies chest pain, SOB, cough, abdominal pain, nausea, vomiting, fever, chills, lightheadedness, dizziness,headache, tingling or numbness  No bowel or bladder problem    Patient does have excessive daytime sleepiness has been seen by pulmonologist and been scheduled for sleep apnea study      The following portions of the patient's history were reviewed and updated as appropriate:   Past Medical History:  She has a past medical history of Acute non-recurrent frontal sinusitis (3/13/2023), Allergic rhinitis, Disease of thyroid gland, HL (hearing loss), Hypertension, Liver hemangioma, AMY (obstructive sleep apnea) (1/16/2023), and Tinnitus  ,  _______________________________________________________________________  Medical Problems:  does not have any pertinent problems on file ,  _______________________________________________________________________  Past Surgical History:   has a past surgical history that includes Rectal prolapse repair; Knee surgery (Left, 12/2006); Rectal surgery; Colonoscopy (07/15/2022); and Hartman tooth extraction  ,  _______________________________________________________________________  Family History:  family history includes Breast cancer (age of onset: 54) in her maternal grandmother; Dementia in her mother; Heart attack in her father; Hyperlipidemia in her father and mother; Hypertension in her father and mother; Migraines in her brother and sister; Skin cancer in her father; Stroke in her father ,  _______________________________________________________________________  Social History:   reports that she quit smoking about 29 years ago  Her smoking use included cigarettes  She smoked an average of  25 packs per day  She has never used smokeless tobacco  She reports current alcohol use  She reports that she does not use drugs  ,  _______________________________________________________________________  Allergies:  is allergic to sertraline     _______________________________________________________________________  Current Outpatient Medications   Medication Sig Dispense Refill   • ALPRAZolam (XANAX) 0 25 mg tablet Daily     • Camrese Lo 0 1-0 02 & 0 01 MG TABS TAKE ONE TABLET BY MOUTH EVERY DAY 91 tablet 2   • Cholecalciferol 125 MCG (5000 UT) capsule Daily     • Cyanocobalamin (VITAMIN B-12 PO) Take by mouth     • fluticasone (FLONASE) 50 mcg/act nasal spray 2 sprays into each nostril daily     • levothyroxine 75 mcg tablet TAKE ONE TABLET BY MOUTH EVERY DAY AS DIRECTED 90 tablet 0   • losartan (COZAAR) 50 mg tablet TAKE ONE "TABLET BY MOUTH EVERY DAY AT BEDTIME 90 tablet 0   • ondansetron (ZOFRAN-ODT) 4 mg disintegrating tablet      • venlafaxine (EFFEXOR-XR) 75 mg 24 hr capsule TAKE ONE CAPSULE BY MOUTH EVERY DAY 90 capsule 0     No current facility-administered medications for this visit      _______________________________________________________________________  Review of Systems   Constitutional: Negative for chills, fatigue and fever  HENT: Negative for congestion, ear pain, rhinorrhea, sneezing and sore throat  Eyes: Negative for redness, itching and visual disturbance  Respiratory: Negative for cough, chest tightness and shortness of breath  Cardiovascular: Negative for chest pain, palpitations and leg swelling  Gastrointestinal: Negative for abdominal pain, blood in stool, diarrhea, nausea and vomiting  Endocrine: Negative for cold intolerance and heat intolerance  Genitourinary: Negative for dysuria, frequency and urgency  Musculoskeletal: Negative for arthralgias, back pain and myalgias  Skin: Negative for color change and rash  Neurological: Negative for dizziness, weakness, light-headedness, numbness and headaches  Hematological: Does not bruise/bleed easily  Psychiatric/Behavioral: Negative for agitation, behavioral problems and confusion  Objective:  Vitals:    03/30/23 1533   BP: 130/80   BP Location: Left arm   Patient Position: Sitting   Cuff Size: Standard   Pulse: 68   Resp: 16   Temp: 97 8 °F (36 6 °C)   TempSrc: Temporal   SpO2: 98%   Weight: 62 6 kg (138 lb)   Height: 5' 3\" (1 6 m)     Body mass index is 24 45 kg/m²  Physical Exam  Vitals and nursing note reviewed  Constitutional:       General: She is not in acute distress  Appearance: Normal appearance  She is not ill-appearing, toxic-appearing or diaphoretic  HENT:      Head: Normocephalic and atraumatic        Right Ear: Tympanic membrane and ear canal normal       Left Ear: Tympanic membrane and ear canal normal  " Nose: Nose normal  No congestion  Mouth/Throat:      Mouth: Mucous membranes are moist    Eyes:      General: No scleral icterus  Right eye: No discharge  Left eye: No discharge  Extraocular Movements: Extraocular movements intact  Conjunctiva/sclera: Conjunctivae normal       Pupils: Pupils are equal, round, and reactive to light  Cardiovascular:      Rate and Rhythm: Normal rate and regular rhythm  Pulses: Normal pulses  Heart sounds: Normal heart sounds  No murmur heard  No gallop  Pulmonary:      Effort: Pulmonary effort is normal  No respiratory distress  Breath sounds: Normal breath sounds  No wheezing, rhonchi or rales  Abdominal:      General: Abdomen is flat  Bowel sounds are normal  There is no distension  Palpations: Abdomen is soft  Tenderness: There is no abdominal tenderness  There is no right CVA tenderness, left CVA tenderness or guarding  Musculoskeletal:         General: No swelling or tenderness  Normal range of motion  Cervical back: Normal range of motion and neck supple  No rigidity  Right lower leg: No edema  Left lower leg: No edema  Lymphadenopathy:      Cervical: No cervical adenopathy  Skin:     General: Skin is warm  Capillary Refill: Capillary refill takes 2 to 3 seconds  Coloration: Skin is not jaundiced  Findings: No bruising or rash  Neurological:      General: No focal deficit present  Mental Status: She is alert and oriented to person, place, and time  Mental status is at baseline  Motor: No weakness        Gait: Gait normal    Psychiatric:         Mood and Affect: Mood normal          Behavior: Behavior normal

## 2023-05-15 ENCOUNTER — HOSPITAL ENCOUNTER (OUTPATIENT)
Dept: MAMMOGRAPHY | Facility: HOSPITAL | Age: 53
Discharge: HOME | End: 2023-05-15
Attending: PHYSICIAN ASSISTANT

## 2023-05-15 VITALS — BODY MASS INDEX: 24.45 KG/M2 | WEIGHT: 138.01 LBS | HEIGHT: 63 IN

## 2023-05-15 DIAGNOSIS — Z12.31 ENCOUNTER FOR SCREENING MAMMOGRAM FOR MALIGNANT NEOPLASM OF BREAST: ICD-10-CM

## 2023-05-23 ENCOUNTER — OFFICE VISIT (OUTPATIENT)
Dept: URGENT CARE | Facility: CLINIC | Age: 53
End: 2023-05-23

## 2023-05-23 VITALS
OXYGEN SATURATION: 97 % | RESPIRATION RATE: 16 BRPM | HEART RATE: 60 BPM | TEMPERATURE: 98.5 F | SYSTOLIC BLOOD PRESSURE: 120 MMHG | DIASTOLIC BLOOD PRESSURE: 76 MMHG

## 2023-05-23 DIAGNOSIS — B35.1 TOENAIL FUNGUS: Primary | ICD-10-CM

## 2023-05-23 NOTE — ASSESSMENT & PLAN NOTE
History and exam findings consistent with onychomycosis  Pt has tried several home remedies but has not yet completed a full course of PO antifungals  Thickened nail is growing into surrounding skin and causing pain and swelling  Referral provided for podiatrist and labwork ordered to check liver function  Pt will return in 2 days for lab draw and agrees to follow up with podiatrist   All questions answered

## 2023-05-23 NOTE — PATIENT INSTRUCTIONS
Please return on Thursday, 5/25/23 at 7:20 for fasting bloodwork  Follow up with the podiatrist    Keep feet clean and dry  Wear well fitting shoes  Follow up with your PCP if symptoms worsen or persist more than 3-5 days

## 2023-05-23 NOTE — PROGRESS NOTES
"  [address] [facility]        NAME: Serena Fish is a 47 y o  female  : 1970    MRN: [id number]  DATE: May 23, 2023  TIME: 1:13 PM    Assessment and Plan   Toenail fungus [B35 1]  1  Toenail fungus  Ambulatory Referral to Podiatry    Hepatic function panel            Patient Instructions       Please return on Thursday, 23 at 7:20 for fasting bloodwork  Follow up with the podiatrist    Keep feet clean and dry  Wear well fitting shoes  Follow up with your PCP if symptoms worsen or persist more than 3-5 days  Proceed to  ER if symptoms worsen  Chief Complaint     Chief Complaint   Patient presents with   • Toe Pain     Left great toe          History of Present Illness       Left great toe swelling/ pain x 2 weeks  Toenail is thickened and discolored  The nail \"flakes off\" as it grows  Redness and swelling to medial side of toenail  She denies any trauma or falls  She has tried many treatments for nail fungal infection- creams, bleach soaks, po meds (for a short time) - have not helped  She cannot wear regular shoes- she is wearing sneakers now  Review of Systems   Review of Systems   Constitutional: Negative  Negative for chills, fatigue and fever  HENT: Negative  Negative for ear pain and sore throat  Eyes: Negative  Negative for pain and visual disturbance  Respiratory: Negative  Negative for cough, shortness of breath and wheezing  Cardiovascular: Negative  Negative for chest pain and palpitations  Musculoskeletal: Positive for myalgias (pain to L great toe)  Negative for arthralgias, back pain and joint swelling  Skin: Positive for rash (redness around L great toenail  L great toenail thickened)  Negative for color change  All other systems reviewed and are negative          Current Medications       Current Outpatient Medications:   •  ALPRAZolam (XANAX) 0 25 mg tablet, Daily, Disp: , Rfl:   •  Camrese Lo 0 1-0 02 & 0 01 MG TABS, TAKE ONE TABLET BY MOUTH " EVERY DAY, Disp: 91 tablet, Rfl: 2  •  Cholecalciferol 125 MCG (5000 UT) capsule, Daily, Disp: , Rfl:   •  Cyanocobalamin (VITAMIN B-12 PO), Take by mouth, Disp: , Rfl:   •  fluticasone (FLONASE) 50 mcg/act nasal spray, 2 sprays into each nostril daily, Disp: , Rfl:   •  levothyroxine 75 mcg tablet, TAKE ONE TABLET BY MOUTH EVERY DAY AS DIRECTED, Disp: 90 tablet, Rfl: 0  •  losartan (COZAAR) 50 mg tablet, TAKE ONE TABLET BY MOUTH EVERY DAY AT BEDTIME, Disp: 90 tablet, Rfl: 0  •  ondansetron (ZOFRAN-ODT) 4 mg disintegrating tablet, , Disp: , Rfl:   •  venlafaxine (EFFEXOR-XR) 75 mg 24 hr capsule, TAKE ONE CAPSULE BY MOUTH EVERY DAY, Disp: 90 capsule, Rfl: 0    Current Allergies     Allergies as of 05/23/2023 - Reviewed 05/23/2023   Allergen Reaction Noted   • Sertraline Hives 02/22/2021            The following portions of the patient's history were reviewed and updated as appropriate: allergies, current medications, past family history, past medical history, past social history, past surgical history and problem list      Past Medical History:   Diagnosis Date   • Acute non-recurrent frontal sinusitis 3/13/2023   • Allergic rhinitis    • Disease of thyroid gland     hypothyroidism   • HL (hearing loss)    • Hypertension    • Liver hemangioma    • AMY (obstructive sleep apnea) 1/16/2023   • Tinnitus        Past Surgical History:   Procedure Laterality Date   • COLONOSCOPY  07/15/2022   • KNEE SURGERY Left 12/2006   • RECTAL PROLAPSE REPAIR     • RECTAL SURGERY      prolapsed rectum repair x2   • WISDOM TOOTH EXTRACTION         Family History   Problem Relation Age of Onset   • Hypertension Mother    • Hyperlipidemia Mother    • Dementia Mother    • Heart attack Father         x2   • Hypertension Father    • Hyperlipidemia Father    • Stroke Father    • Skin cancer Father    • Migraines Sister    • No Known Problems Sister    • Breast cancer Maternal Grandmother 54   • Migraines Brother    • No Known Problems Son    • No Known Problems Son    • No Known Problems Daughter    • No Known Problems Maternal Aunt    • No Known Problems Maternal Aunt    • No Known Problems Paternal Aunt    • No Known Problems Paternal Aunt    • Breast cancer Paternal Aunt 72         Medications have been verified  Objective   /76 (BP Location: Right arm, Patient Position: Sitting, Cuff Size: Adult)   Pulse 60   Temp 98 5 °F (36 9 °C) (Tympanic)   Resp 16   SpO2 97%   No LMP recorded  (Menstrual status: Birth Control)  Physical Exam     Physical Exam  Vitals reviewed  Constitutional:       Appearance: Normal appearance  She is well-developed  HENT:      Head: Normocephalic and atraumatic  Mouth/Throat:      Mouth: Mucous membranes are moist       Pharynx: Oropharynx is clear  Eyes:      Conjunctiva/sclera: Conjunctivae normal    Cardiovascular:      Rate and Rhythm: Normal rate and regular rhythm  Pulses: Normal pulses  Pulmonary:      Effort: Pulmonary effort is normal    Musculoskeletal:         General: Normal range of motion  Cervical back: Normal range of motion and neck supple  Right foot: Normal       Left foot: Normal capillary refill  Swelling and tenderness present  Normal pulse  Feet:    Skin:     General: Skin is warm and dry  Capillary Refill: Capillary refill takes less than 2 seconds  Neurological:      General: No focal deficit present  Mental Status: She is alert and oriented to person, place, and time     Psychiatric:         Mood and Affect: Mood normal          Behavior: Behavior normal

## 2023-05-25 ENCOUNTER — APPOINTMENT (OUTPATIENT)
Dept: URGENT CARE | Facility: CLINIC | Age: 53
End: 2023-05-25

## 2023-05-25 DIAGNOSIS — E03.4 IDIOPATHIC ATROPHIC HYPOTHYROIDISM: ICD-10-CM

## 2023-05-25 DIAGNOSIS — E55.9 VITAMIN D DEFICIENCY: ICD-10-CM

## 2023-05-25 DIAGNOSIS — E78.2 MIXED HYPERLIPIDEMIA: Primary | ICD-10-CM

## 2023-05-25 LAB
25(OH)D3 SERPL-MCNC: 58.6 NG/ML (ref 30–100)
ALBUMIN SERPL BCP-MCNC: 3.6 G/DL (ref 3.5–5)
ALP SERPL-CCNC: 94 U/L (ref 46–116)
ALT SERPL W P-5'-P-CCNC: 43 U/L (ref 12–78)
ANION GAP SERPL CALCULATED.3IONS-SCNC: 0 MMOL/L (ref 4–13)
AST SERPL W P-5'-P-CCNC: 32 U/L (ref 5–45)
BASOPHILS # BLD AUTO: 0.03 THOUSANDS/ÂΜL (ref 0–0.1)
BASOPHILS NFR BLD AUTO: 1 % (ref 0–1)
BILIRUB DIRECT SERPL-MCNC: 0.11 MG/DL (ref 0–0.2)
BILIRUB SERPL-MCNC: 0.42 MG/DL (ref 0.2–1)
BILIRUB UR QL STRIP: NEGATIVE
BUN SERPL-MCNC: 11 MG/DL (ref 5–25)
CALCIUM SERPL-MCNC: 9.8 MG/DL (ref 8.3–10.1)
CHLORIDE SERPL-SCNC: 108 MMOL/L (ref 96–108)
CHOLEST SERPL-MCNC: 201 MG/DL
CLARITY UR: CLEAR
CO2 SERPL-SCNC: 28 MMOL/L (ref 21–32)
COLOR UR: NORMAL
CREAT SERPL-MCNC: 0.8 MG/DL (ref 0.6–1.3)
EOSINOPHIL # BLD AUTO: 0.07 THOUSAND/ÂΜL (ref 0–0.61)
EOSINOPHIL NFR BLD AUTO: 2 % (ref 0–6)
ERYTHROCYTE [DISTWIDTH] IN BLOOD BY AUTOMATED COUNT: 12.8 % (ref 11.6–15.1)
GFR SERPL CREATININE-BSD FRML MDRD: 85 ML/MIN/1.73SQ M
GLUCOSE P FAST SERPL-MCNC: 74 MG/DL (ref 65–99)
GLUCOSE UR STRIP-MCNC: NEGATIVE MG/DL
HCT VFR BLD AUTO: 39.6 % (ref 34.8–46.1)
HDLC SERPL-MCNC: 72 MG/DL
HGB BLD-MCNC: 13.1 G/DL (ref 11.5–15.4)
HGB UR QL STRIP.AUTO: NEGATIVE
IMM GRANULOCYTES # BLD AUTO: 0 THOUSAND/UL (ref 0–0.2)
IMM GRANULOCYTES NFR BLD AUTO: 0 % (ref 0–2)
KETONES UR STRIP-MCNC: NEGATIVE MG/DL
LDLC SERPL CALC-MCNC: 109 MG/DL (ref 0–100)
LEUKOCYTE ESTERASE UR QL STRIP: NEGATIVE
LYMPHOCYTES # BLD AUTO: 1.09 THOUSANDS/ÂΜL (ref 0.6–4.47)
LYMPHOCYTES NFR BLD AUTO: 27 % (ref 14–44)
MCH RBC QN AUTO: 32 PG (ref 26.8–34.3)
MCHC RBC AUTO-ENTMCNC: 33.1 G/DL (ref 31.4–37.4)
MCV RBC AUTO: 97 FL (ref 82–98)
MONOCYTES # BLD AUTO: 0.53 THOUSAND/ÂΜL (ref 0.17–1.22)
MONOCYTES NFR BLD AUTO: 13 % (ref 4–12)
NEUTROPHILS # BLD AUTO: 2.29 THOUSANDS/ÂΜL (ref 1.85–7.62)
NEUTS SEG NFR BLD AUTO: 57 % (ref 43–75)
NITRITE UR QL STRIP: NEGATIVE
NRBC BLD AUTO-RTO: 0 /100 WBCS
PH UR STRIP.AUTO: 5.5 [PH]
PLATELET # BLD AUTO: 373 THOUSANDS/UL (ref 149–390)
PMV BLD AUTO: 10.2 FL (ref 8.9–12.7)
POTASSIUM SERPL-SCNC: 3.9 MMOL/L (ref 3.5–5.3)
PROT SERPL-MCNC: 7 G/DL (ref 6.4–8.4)
PROT UR STRIP-MCNC: NEGATIVE MG/DL
RBC # BLD AUTO: 4.1 MILLION/UL (ref 3.81–5.12)
SODIUM SERPL-SCNC: 136 MMOL/L (ref 135–147)
SP GR UR STRIP.AUTO: 1.02 (ref 1–1.03)
TRIGL SERPL-MCNC: 101 MG/DL
TSH SERPL DL<=0.05 MIU/L-ACNC: 1.53 UIU/ML (ref 0.45–4.5)
UROBILINOGEN UR STRIP-ACNC: <2 MG/DL
WBC # BLD AUTO: 4.01 THOUSAND/UL (ref 4.31–10.16)

## 2023-05-25 NOTE — PROGRESS NOTES
Pt has labwork ordered by her PCP  She has been fasting  Labwork drawn and sent to the lab  Pt reports she has an appointment scheduled with a podiatrist to follow up about her toenail fungus

## 2023-06-07 DIAGNOSIS — I10 PRIMARY HYPERTENSION: ICD-10-CM

## 2023-06-07 PROCEDURE — 87102 FUNGUS ISOLATION CULTURE: CPT | Performed by: PODIATRIST

## 2023-06-07 RX ORDER — LOSARTAN POTASSIUM 50 MG/1
TABLET ORAL
Qty: 90 TABLET | Refills: 0 | Status: SHIPPED | OUTPATIENT
Start: 2023-06-07

## 2023-06-08 ENCOUNTER — TELEPHONE (OUTPATIENT)
Dept: FAMILY MEDICINE CLINIC | Facility: CLINIC | Age: 53
End: 2023-06-08

## 2023-06-08 NOTE — TELEPHONE ENCOUNTER
Called and spoke to patient regarding TSH labs  She was not aware that it was done on 5/23/2023  Reviewed with patient

## 2023-06-08 NOTE — TELEPHONE ENCOUNTER
Hi this is Darian Mejia Date of birth [birth year]  I  did some blood work recently and I don't think it included my thyroid blood work that I needed  So can you get back to me if Doctor Marely Hanna can write something to get that tested  I have to get blood work done for a different doctor  But if you can add that to the list of things that need to be tested  Thank you for [phone]  Thank you  You received a voice mail from [facility]

## 2023-06-12 ENCOUNTER — LAB REQUISITION (OUTPATIENT)
Dept: LAB | Facility: HOSPITAL | Age: 53
End: 2023-06-12
Payer: COMMERCIAL

## 2023-06-12 DIAGNOSIS — B35.1 TINEA UNGUIUM: ICD-10-CM

## 2023-06-19 LAB — FUNGUS SPEC CULT: NORMAL

## 2023-06-23 ENCOUNTER — HOSPITAL ENCOUNTER (OUTPATIENT)
Dept: MRI IMAGING | Facility: HOSPITAL | Age: 53
End: 2023-06-23
Payer: COMMERCIAL

## 2023-06-23 DIAGNOSIS — M79.89 SWELLING OF LIMB: ICD-10-CM

## 2023-06-23 PROCEDURE — G1004 CDSM NDSC: HCPCS

## 2023-06-23 PROCEDURE — 73718 MRI LOWER EXTREMITY W/O DYE: CPT

## 2023-06-25 DIAGNOSIS — F41.1 GENERALIZED ANXIETY DISORDER: ICD-10-CM

## 2023-06-25 DIAGNOSIS — E03.4 IDIOPATHIC ATROPHIC HYPOTHYROIDISM: ICD-10-CM

## 2023-06-26 ENCOUNTER — APPOINTMENT (OUTPATIENT)
Dept: LAB | Facility: AMBULARY SURGERY CENTER | Age: 53
End: 2023-06-26
Payer: COMMERCIAL

## 2023-06-26 DIAGNOSIS — M25.572 LEFT ANKLE PAIN, UNSPECIFIED CHRONICITY: ICD-10-CM

## 2023-06-26 LAB
ANA SER QL IA: NEGATIVE
CRP SERPL QL: <3 MG/L
FUNGUS SPEC CULT: NORMAL

## 2023-06-26 PROCEDURE — 36415 COLL VENOUS BLD VENIPUNCTURE: CPT

## 2023-06-26 PROCEDURE — 86038 ANTINUCLEAR ANTIBODIES: CPT

## 2023-06-26 PROCEDURE — 86140 C-REACTIVE PROTEIN: CPT

## 2023-06-26 PROCEDURE — 86430 RHEUMATOID FACTOR TEST QUAL: CPT

## 2023-06-26 RX ORDER — LEVOTHYROXINE SODIUM 0.07 MG/1
TABLET ORAL
Qty: 90 TABLET | Refills: 0 | Status: SHIPPED | OUTPATIENT
Start: 2023-06-26

## 2023-06-26 RX ORDER — VENLAFAXINE HYDROCHLORIDE 75 MG/1
CAPSULE, EXTENDED RELEASE ORAL
Qty: 90 CAPSULE | Refills: 0 | Status: SHIPPED | OUTPATIENT
Start: 2023-06-26

## 2023-06-27 LAB — RHEUMATOID FACT SER QL LA: NEGATIVE

## 2023-07-03 LAB — FUNGUS SPEC CULT: NORMAL

## 2023-07-10 LAB — FUNGUS SPEC CULT: NORMAL

## 2023-07-13 ENCOUNTER — OFFICE VISIT (OUTPATIENT)
Dept: FAMILY MEDICINE CLINIC | Facility: CLINIC | Age: 53
End: 2023-07-13
Payer: COMMERCIAL

## 2023-07-13 VITALS
OXYGEN SATURATION: 99 % | WEIGHT: 138 LBS | BODY MASS INDEX: 24.45 KG/M2 | HEIGHT: 63 IN | TEMPERATURE: 97.6 F | DIASTOLIC BLOOD PRESSURE: 80 MMHG | SYSTOLIC BLOOD PRESSURE: 130 MMHG | HEART RATE: 79 BPM

## 2023-07-13 DIAGNOSIS — E78.2 MIXED HYPERLIPIDEMIA: ICD-10-CM

## 2023-07-13 DIAGNOSIS — F41.1 GENERALIZED ANXIETY DISORDER: ICD-10-CM

## 2023-07-13 DIAGNOSIS — E03.4 IDIOPATHIC ATROPHIC HYPOTHYROIDISM: ICD-10-CM

## 2023-07-13 DIAGNOSIS — I10 HTN (HYPERTENSION), BENIGN: Primary | ICD-10-CM

## 2023-07-13 PROCEDURE — 99213 OFFICE O/P EST LOW 20 MIN: CPT

## 2023-07-13 NOTE — PROGRESS NOTES
Assessment/Plan:         Problem List Items Addressed This Visit        Endocrine    Idiopathic atrophic hypothyroidism     Under control. Continue current medication. We will re-evaluate at next office visit. Cardiovascular and Mediastinum    HTN (hypertension), benign - Primary     Under control. Continue current medication. We will re-evaluate at next office visit. Other    Generalized anxiety disorder     Under control. Continue current medication. We will re-evaluate at next office visit. Mixed hyperlipidemia     Under control. Continue current medication. We will re-evaluate at next office visit. Subjective:      Patient ID: Betsy Hsu is a 47 y.o. female. Patient here for review of chronic medical problems and  the labs and imaging if it is applicable. Currently has no specific complaints other than mentioned in the review of systems  Denies chest pain, SOB, cough, abdominal pain, nausea, vomiting, fever, chills, lightheadedness, dizziness,headache, tingling or numbness. No bowel or bladder problem. Patient dropped 2030 pounds on the left great toe quite many years ago and it has been swollen for many years but now it is kind of hurting so she has been seen by different podiatrist finally 1 podiatrist did an MRI of the toe and it reported as possible osteomyelitis of distal phalanx she is getting second MRI in near future and she might need to help partial amputation of left great toe      The following portions of the patient's history were reviewed and updated as appropriate:   Past Medical History:  She has a past medical history of Acute non-recurrent frontal sinusitis (3/13/2023), Allergic rhinitis, Disease of thyroid gland, HL (hearing loss), Hypertension, Liver hemangioma, AMY (obstructive sleep apnea) (1/16/2023), and Tinnitus. ,  _______________________________________________________________________  Medical Problems:  does not have any pertinent problems on file.,  _______________________________________________________________________  Past Surgical History:   has a past surgical history that includes Rectal prolapse repair; Knee surgery (Left, 12/2006); Rectal surgery; Colonoscopy (07/15/2022); and [place] tooth extraction. ,  _______________________________________________________________________  Family History:  family history includes Anxiety disorder in her mother; Breast cancer in her maternal grandmother; Breast cancer (age of onset: 79) in her paternal aunt; Dementia in her mother; Heart attack in her father; Hyperlipidemia in her father and mother; Hypertension in her father and mother; Migraines in her brother and sister; No Known Problems in her daughter, maternal aunt, maternal aunt, paternal aunt, paternal aunt, sister, son, and son; Skin cancer in her father; Stroke in her father.,  _______________________________________________________________________  Social History:   reports that she quit smoking about 29 years ago. Her smoking use included cigarettes. She smoked an average of .25 packs per day. She has never used smokeless tobacco. She reports that she does not currently use alcohol. She reports that she does not use drugs. ,  _______________________________________________________________________  Allergies:  is allergic to sertraline. .  _______________________________________________________________________  Current Outpatient Medications   Medication Sig Dispense Refill   • ALPRAZolam (XANAX) 0.25 mg tablet Daily     • Camrese Lo 0.1-0.02 & 0.01 MG TABS TAKE ONE TABLET BY MOUTH EVERY DAY 91 tablet 2   • Cholecalciferol 125 MCG (5000 UT) capsule Daily     • Cyanocobalamin (VITAMIN B-12 PO) Take by mouth     • fluticasone (FLONASE) 50 mcg/act nasal spray 2 sprays into each nostril daily     • levothyroxine 75 mcg tablet TAKE ONE TABLET BY MOUTH EVERY DAY AS DIRECTED 90 tablet 0   • losartan (COZAAR) 50 mg tablet TAKE ONE TABLET BY MOUTH EVERY DAY AT BEDTIME 90 tablet 0   • ondansetron (ZOFRAN-ODT) 4 mg disintegrating tablet      • venlafaxine (EFFEXOR-XR) 75 mg 24 hr capsule TAKE ONE CAPSULE BY MOUTH EVERY DAY 90 capsule 0     No current facility-administered medications for this visit.     _______________________________________________________________________  Review of Systems   Constitutional: Negative for chills, fatigue and fever. HENT: Negative for congestion, ear pain, rhinorrhea, sneezing and sore throat. Eyes: Negative for redness, itching and visual disturbance. Respiratory: Negative for cough, chest tightness and shortness of breath. Cardiovascular: Negative for chest pain, palpitations and leg swelling. Gastrointestinal: Negative for abdominal pain, blood in stool, diarrhea, nausea and vomiting. Endocrine: Negative for cold intolerance and heat intolerance. Genitourinary: Negative for dysuria, frequency and urgency. Musculoskeletal: Negative for arthralgias, back pain and myalgias. Skin: Negative for color change and rash. Neurological: Negative for dizziness, weakness, light-headedness, numbness and headaches. Hematological: Does not bruise/bleed easily. Psychiatric/Behavioral: Negative for agitation, behavioral problems and confusion. Objective:  Vitals:    07/13/23 1439   BP: 130/80   BP Location: Left arm   Patient Position: Sitting   Cuff Size: Adult   Pulse: 79   Temp: 97.6 °F (36.4 °C)   TempSrc: Tympanic   SpO2: 99%   Weight: 62.6 kg (138 lb)   Height: 5' 3" (1.6 m)     Body mass index is 24.45 kg/m². Physical Exam  Vitals and nursing note reviewed. Constitutional:       General: She is not in acute distress. Appearance: Normal appearance. She is not ill-appearing, toxic-appearing or diaphoretic. HENT:      Head: Normocephalic and atraumatic.       Right Ear: Tympanic membrane and ear canal normal.      Left Ear: Tympanic membrane and ear canal normal.      Nose: Nose normal. No congestion. Mouth/Throat:      Mouth: Mucous membranes are moist.   Eyes:      General: No scleral icterus. Right eye: No discharge. Left eye: No discharge. Extraocular Movements: Extraocular movements intact. Conjunctiva/sclera: Conjunctivae normal.      Pupils: Pupils are equal, round, and reactive to light. Cardiovascular:      Rate and Rhythm: Normal rate and regular rhythm. Pulses: Normal pulses. Heart sounds: Normal heart sounds. No murmur heard. No gallop. Pulmonary:      Effort: Pulmonary effort is normal. No respiratory distress. Breath sounds: Normal breath sounds. No wheezing, rhonchi or rales. Abdominal:      General: Abdomen is flat. Bowel sounds are normal. There is no distension. Palpations: Abdomen is soft. Tenderness: There is no abdominal tenderness. There is no right CVA tenderness, left CVA tenderness or guarding. Musculoskeletal:         General: No swelling or tenderness. Normal range of motion. Cervical back: Normal range of motion and neck supple. No rigidity. Right lower leg: No edema. Left lower leg: No edema. Lymphadenopathy:      Cervical: No cervical adenopathy. Skin:     General: Skin is warm. Capillary Refill: Capillary refill takes 2 to 3 seconds. Coloration: Skin is not jaundiced. Findings: No bruising or rash. Neurological:      General: No focal deficit present. Mental Status: She is alert and oriented to person, place, and time. Mental status is at baseline. Motor: No weakness.       Gait: Gait normal.   Psychiatric:         Mood and Affect: Mood normal.         Behavior: Behavior normal.

## 2023-07-17 LAB — FUNGUS SPEC CULT: NORMAL

## 2023-07-18 NOTE — PROGRESS NOTES
Assessment/Plan   Problem List Items Addressed This Visit    None  Visit Diagnoses     Well woman exam    -  Primary    Menopause        Relevant Orders    Follicle stimulating hormone    Luteinizing hormone    Estradiol          Discussion  I have discussed the importance of monthly self-breast exams, exercise and healthy diet as well as adequate intake of calcium and vitamin D. Encourage safe sexual practices; STI testing - declines  Contraception - JUWAN    The current ASCCP guidelines were reviewed. Patient's last pap was 2022 and therefore, a pap with HPV cotesting is  not indicated at this time. Mammogram is UTD. Colorectal screening is UTD. All questions have been answered to her satisfaction  RTO for APE or sooner if needed      Subjective     HPI   Umesh López is a 47 y.o. female who presents for annual well woman exam.     She has no periods while utilizing the MOVE Guides. She recently ran out of MOVE Guides x1 month and has not taken it. She still did not get her menses. Menopausal sxs: occasional hot flashes; +weight gain; hypothyroidism. No vulvar itch/burn; No vaginal itch/burn; No abn discharge or odor; No urinary sx - burning/pain/frequency/hematuria    (-) SBEs - no breast concerns; No abd/pelvic pain or HAs;     Pt is not currently sexually active. Feels safe at home. Current contraception: JUWAN      (+) PCP for routine Bw/care; Last Pap - 04/2022 WNL  History of abnormal Pap smear: no  Last mammo - 05/2023 BIRADS2  History of abnormal mammogram: no   Last colonoscopy - 05/2022; repeat in 7 years    Review of Systems   Constitutional: Negative for fatigue. Eyes: Negative for photophobia and visual disturbance. Respiratory: Negative for cough and shortness of breath. Cardiovascular: Negative for chest pain and palpitations. Gastrointestinal: Negative for abdominal pain, blood in stool, constipation, diarrhea, nausea and rectal pain.    Genitourinary: Negative for dyspareunia, dysuria, flank pain, frequency, genital sores, menstrual problem, pelvic pain, urgency, vaginal bleeding, vaginal discharge and vaginal pain. Musculoskeletal: Negative for arthralgias and back pain. Skin: Negative for rash. Neurological: Negative for weakness and headaches.        The following portions of the patient's history were reviewed and updated as appropriate: allergies, current medications, past family history, past medical history, past social history, past surgical history and problem list.         OB History        3    Para   3    Term   3            AB        Living   3       SAB        IAB        Ectopic        Multiple        Live Births                     Past Medical History:   Diagnosis Date   • Acute non-recurrent frontal sinusitis 3/13/2023   • Allergic rhinitis    • Disease of thyroid gland     hypothyroidism   • HL (hearing loss)    • Hypertension    • Liver hemangioma    • AMY (obstructive sleep apnea) 2023   • Tinnitus        Past Surgical History:   Procedure Laterality Date   • COLONOSCOPY  07/15/2022   • KNEE SURGERY Left 2006   • RECTAL PROLAPSE REPAIR     • RECTAL SURGERY      prolapsed rectum repair x2   • WISDOM TOOTH EXTRACTION         Family History   Problem Relation Age of Onset   • Hypertension Mother    • Hyperlipidemia Mother    • Dementia Mother    • Anxiety disorder Mother    • Heart attack Father         x2   • Hypertension Father    • Hyperlipidemia Father    • Stroke Father    • Skin cancer Father    • Migraines Sister    • No Known Problems Sister    • Breast cancer Maternal Grandmother    • Migraines Brother    • No Known Problems Son    • No Known Problems Son    • No Known Problems Daughter    • No Known Problems Maternal Aunt    • No Known Problems Maternal Aunt    • No Known Problems Paternal Aunt    • No Known Problems Paternal Aunt    • Breast cancer Paternal Aunt 72       Social History     Socioeconomic History   • Marital status:      Spouse name: Not on file   • Number of children: Not on file   • Years of education: Not on file   • Highest education level: Not on file   Occupational History   • Not on file   Tobacco Use   • Smoking status: Former     Packs/day: 0.25     Types: Cigarettes     Quit date: 1994     Years since quittin.5   • Smokeless tobacco: Never   • Tobacco comments:     social cigarette use   Vaping Use   • Vaping Use: Former   Substance and Sexual Activity   • Alcohol use: Yes     Comment: holiday only-twice/year   • Drug use: Never   • Sexual activity: Not Currently     Partners: Male     Birth control/protection: None   Other Topics Concern   • Not on file   Social History Narrative   • Not on file     Social Determinants of Health     Financial Resource Strain: Not on file   Food Insecurity: Not on file   Transportation Needs: Not on file   Physical Activity: Not on file   Stress: Not on file   Social Connections: Not on file   Intimate Partner Violence: Not on file   Housing Stability: Not on file         Current Outpatient Medications:   •  ALPRAZolam (XANAX) 0.25 mg tablet, Daily, Disp: , Rfl:   •  Camrese Lo 0.1-0.02 & 0.01 MG TABS, TAKE ONE TABLET BY MOUTH EVERY DAY, Disp: 91 tablet, Rfl: 2  •  Cholecalciferol 125 MCG (5000 UT) capsule, Daily, Disp: , Rfl:   •  Cyanocobalamin (VITAMIN B-12 PO), Take by mouth, Disp: , Rfl:   •  levothyroxine 75 mcg tablet, TAKE ONE TABLET BY MOUTH EVERY DAY AS DIRECTED, Disp: 90 tablet, Rfl: 0  •  losartan (COZAAR) 50 mg tablet, TAKE ONE TABLET BY MOUTH EVERY DAY AT BEDTIME, Disp: 90 tablet, Rfl: 0  •  meclizine (ANTIVERT) 12.5 MG tablet, Take 12.5 mg by mouth if needed for dizziness, Disp: , Rfl:   •  venlafaxine (EFFEXOR-XR) 75 mg 24 hr capsule, TAKE ONE CAPSULE BY MOUTH EVERY DAY, Disp: 90 capsule, Rfl: 0  •  fluticasone (FLONASE) 50 mcg/act nasal spray, 2 sprays into each nostril daily, Disp: , Rfl:   •  ondansetron (ZOFRAN-ODT) 4 mg disintegrating tablet, , Disp: , Rfl:   No current facility-administered medications for this visit. Allergies   Allergen Reactions   • Sertraline Hives and Rash       Objective   Vitals:    07/20/23 1526   BP: 120/78   BP Location: Left arm   Patient Position: Sitting   Cuff Size: Standard   Weight: 63 kg (139 lb)   Height: 5' 3" (1.6 m)     Physical Exam  Vitals and nursing note reviewed. Constitutional:       Appearance: Normal appearance. She is well-developed and normal weight. HENT:      Head: Normocephalic and atraumatic. Eyes:      Conjunctiva/sclera: Conjunctivae normal.   Cardiovascular:      Rate and Rhythm: Normal rate and regular rhythm. Heart sounds: Normal heart sounds. Pulmonary:      Effort: Pulmonary effort is normal.      Breath sounds: Normal breath sounds. Chest:   Breasts:     Breasts are symmetrical.      Right: Normal. No inverted nipple, mass, nipple discharge, skin change or tenderness. Left: Normal. No inverted nipple, mass, nipple discharge, skin change or tenderness. Abdominal:      General: Abdomen is flat. There is no distension. Palpations: Abdomen is soft. There is no mass. Tenderness: There is no abdominal tenderness. There is no right CVA tenderness or left CVA tenderness. Genitourinary:     General: Normal vulva. Exam position: Lithotomy position. Pubic Area: No rash or pubic lice. Labia:         Right: No rash or tenderness. Left: No rash or tenderness. Urethra: No urethral pain. Vagina: Normal. No vaginal discharge. Cervix: Normal.      Uterus: Normal. No uterine prolapse. Adnexa: Right adnexa normal and left adnexa normal.        Right: No mass or tenderness. Left: No mass or tenderness. Musculoskeletal:         General: No tenderness. Normal range of motion. Cervical back: Normal range of motion. No tenderness. Right lower leg: No edema. Left lower leg: No edema.    Lymphadenopathy: Cervical: No cervical adenopathy. Upper Body:      Right upper body: No supraclavicular or axillary adenopathy. Left upper body: No supraclavicular or axillary adenopathy. Lower Body: No right inguinal adenopathy. No left inguinal adenopathy. Skin:     General: Skin is warm and dry. Neurological:      Mental Status: She is alert and oriented to person, place, and time. Motor: No weakness. Psychiatric:         Mood and Affect: Mood normal.         Behavior: Behavior normal.         Thought Content:  Thought content normal.         Judgment: Judgment normal.         Patient Instructions   [facility] Complete

## 2023-07-19 ENCOUNTER — HOSPITAL ENCOUNTER (OUTPATIENT)
Dept: MRI IMAGING | Facility: HOSPITAL | Age: 53
Discharge: HOME | End: 2023-07-19
Payer: COMMERCIAL

## 2023-07-19 DIAGNOSIS — M86.9 OSTEOMYELITIS OF LEFT FOOT, UNSPECIFIED TYPE (HCC): ICD-10-CM

## 2023-07-19 PROCEDURE — G1004 CDSM NDSC: HCPCS

## 2023-07-19 PROCEDURE — 73720 MRI LWR EXTREMITY W/O&W/DYE: CPT

## 2023-07-19 PROCEDURE — A9585 GADOBUTROL INJECTION: HCPCS | Performed by: PODIATRIST

## 2023-07-19 RX ADMIN — GADOBUTROL 5 ML: 604.72 INJECTION INTRAVENOUS at 16:44

## 2023-07-20 ENCOUNTER — ANNUAL EXAM (OUTPATIENT)
Dept: OBGYN CLINIC | Facility: CLINIC | Age: 53
End: 2023-07-20
Payer: COMMERCIAL

## 2023-07-20 VITALS
SYSTOLIC BLOOD PRESSURE: 120 MMHG | HEIGHT: 63 IN | BODY MASS INDEX: 24.63 KG/M2 | WEIGHT: 139 LBS | DIASTOLIC BLOOD PRESSURE: 78 MMHG

## 2023-07-20 DIAGNOSIS — Z78.0 MENOPAUSE: ICD-10-CM

## 2023-07-20 DIAGNOSIS — Z01.419 WELL WOMAN EXAM: Primary | ICD-10-CM

## 2023-07-20 PROCEDURE — S0612 ANNUAL GYNECOLOGICAL EXAMINA: HCPCS

## 2023-07-20 RX ORDER — MECLIZINE HCL 12.5 MG/1
12.5 TABLET ORAL AS NEEDED
COMMUNITY

## 2023-08-16 ENCOUNTER — CONSULT (OUTPATIENT)
Dept: FAMILY MEDICINE CLINIC | Facility: CLINIC | Age: 53
End: 2023-08-16
Payer: COMMERCIAL

## 2023-08-16 ENCOUNTER — APPOINTMENT (OUTPATIENT)
Dept: LAB | Facility: AMBULARY SURGERY CENTER | Age: 53
End: 2023-08-16
Payer: COMMERCIAL

## 2023-08-16 VITALS
WEIGHT: 139 LBS | HEIGHT: 63 IN | DIASTOLIC BLOOD PRESSURE: 80 MMHG | TEMPERATURE: 98.4 F | SYSTOLIC BLOOD PRESSURE: 118 MMHG | OXYGEN SATURATION: 99 % | BODY MASS INDEX: 24.63 KG/M2 | HEART RATE: 61 BPM

## 2023-08-16 DIAGNOSIS — F41.1 GENERALIZED ANXIETY DISORDER: ICD-10-CM

## 2023-08-16 DIAGNOSIS — M86.9 TOE OSTEOMYELITIS, RIGHT (HCC): ICD-10-CM

## 2023-08-16 DIAGNOSIS — I10 HTN (HYPERTENSION), BENIGN: ICD-10-CM

## 2023-08-16 DIAGNOSIS — Z01.818 PREOPERATIVE CLEARANCE: Primary | ICD-10-CM

## 2023-08-16 DIAGNOSIS — I10 PRIMARY HYPERTENSION: ICD-10-CM

## 2023-08-16 DIAGNOSIS — E78.2 MIXED HYPERLIPIDEMIA: ICD-10-CM

## 2023-08-16 DIAGNOSIS — E03.4 IDIOPATHIC ATROPHIC HYPOTHYROIDISM: ICD-10-CM

## 2023-08-16 DIAGNOSIS — Z01.818 PREOP TESTING: ICD-10-CM

## 2023-08-16 PROBLEM — B35.1 TOENAIL FUNGUS: Status: RESOLVED | Noted: 2023-05-23 | Resolved: 2023-08-16

## 2023-08-16 LAB
ALBUMIN SERPL BCP-MCNC: 3.7 G/DL (ref 3.5–5)
ALP SERPL-CCNC: 117 U/L (ref 46–116)
ALT SERPL W P-5'-P-CCNC: 42 U/L (ref 12–78)
ANION GAP SERPL CALCULATED.3IONS-SCNC: 3 MMOL/L
AST SERPL W P-5'-P-CCNC: 28 U/L (ref 5–45)
BASOPHILS # BLD AUTO: 0.03 THOUSANDS/ÂΜL (ref 0–0.1)
BASOPHILS NFR BLD AUTO: 1 % (ref 0–1)
BILIRUB SERPL-MCNC: 0.4 MG/DL (ref 0.2–1)
BUN SERPL-MCNC: 13 MG/DL (ref 5–25)
CALCIUM SERPL-MCNC: 10.2 MG/DL (ref 8.3–10.1)
CHLORIDE SERPL-SCNC: 108 MMOL/L (ref 96–108)
CO2 SERPL-SCNC: 29 MMOL/L (ref 21–32)
CREAT SERPL-MCNC: 0.75 MG/DL (ref 0.6–1.3)
EOSINOPHIL # BLD AUTO: 0.06 THOUSAND/ÂΜL (ref 0–0.61)
EOSINOPHIL NFR BLD AUTO: 1 % (ref 0–6)
ERYTHROCYTE [DISTWIDTH] IN BLOOD BY AUTOMATED COUNT: 12.6 % (ref 11.6–15.1)
GFR SERPL CREATININE-BSD FRML MDRD: 91 ML/MIN/1.73SQ M
GLUCOSE P FAST SERPL-MCNC: 84 MG/DL (ref 65–99)
HCT VFR BLD AUTO: 39.2 % (ref 34.8–46.1)
HGB BLD-MCNC: 12.4 G/DL (ref 11.5–15.4)
IMM GRANULOCYTES # BLD AUTO: 0.01 THOUSAND/UL (ref 0–0.2)
IMM GRANULOCYTES NFR BLD AUTO: 0 % (ref 0–2)
LYMPHOCYTES # BLD AUTO: 1.28 THOUSANDS/ÂΜL (ref 0.6–4.47)
LYMPHOCYTES NFR BLD AUTO: 29 % (ref 14–44)
MCH RBC QN AUTO: 29.8 PG (ref 26.8–34.3)
MCHC RBC AUTO-ENTMCNC: 31.6 G/DL (ref 31.4–37.4)
MCV RBC AUTO: 94 FL (ref 82–98)
MONOCYTES # BLD AUTO: 0.61 THOUSAND/ÂΜL (ref 0.17–1.22)
MONOCYTES NFR BLD AUTO: 14 % (ref 4–12)
NEUTROPHILS # BLD AUTO: 2.44 THOUSANDS/ÂΜL (ref 1.85–7.62)
NEUTS SEG NFR BLD AUTO: 55 % (ref 43–75)
NRBC BLD AUTO-RTO: 0 /100 WBCS
PLATELET # BLD AUTO: 351 THOUSANDS/UL (ref 149–390)
PMV BLD AUTO: 10.5 FL (ref 8.9–12.7)
POTASSIUM SERPL-SCNC: 4 MMOL/L (ref 3.5–5.3)
PROT SERPL-MCNC: 7.5 G/DL (ref 6.4–8.4)
RBC # BLD AUTO: 4.16 MILLION/UL (ref 3.81–5.12)
SODIUM SERPL-SCNC: 140 MMOL/L (ref 135–147)
WBC # BLD AUTO: 4.43 THOUSAND/UL (ref 4.31–10.16)

## 2023-08-16 PROCEDURE — 80053 COMPREHEN METABOLIC PANEL: CPT

## 2023-08-16 PROCEDURE — 85025 COMPLETE CBC W/AUTO DIFF WBC: CPT

## 2023-08-16 PROCEDURE — 93000 ELECTROCARDIOGRAM COMPLETE: CPT | Performed by: INTERNAL MEDICINE

## 2023-08-16 PROCEDURE — 36415 COLL VENOUS BLD VENIPUNCTURE: CPT

## 2023-08-16 PROCEDURE — 99213 OFFICE O/P EST LOW 20 MIN: CPT | Performed by: INTERNAL MEDICINE

## 2023-08-16 RX ORDER — VENLAFAXINE HYDROCHLORIDE 75 MG/1
75 CAPSULE, EXTENDED RELEASE ORAL DAILY
Qty: 90 CAPSULE | Refills: 0 | Status: SHIPPED | OUTPATIENT
Start: 2023-08-16

## 2023-08-16 RX ORDER — LOSARTAN POTASSIUM 50 MG/1
50 TABLET ORAL
Qty: 90 TABLET | Refills: 0 | Status: SHIPPED | OUTPATIENT
Start: 2023-08-16

## 2023-08-16 RX ORDER — LEVOTHYROXINE SODIUM 0.07 MG/1
75 TABLET ORAL DAILY
Qty: 90 TABLET | Refills: 0 | Status: SHIPPED | OUTPATIENT
Start: 2023-08-16

## 2023-08-16 NOTE — PROGRESS NOTES
Name: Martin Hernández      : 1970      MRN: [id number]  Encounter Provider: Alize Sandoval MD  Encounter Date: 2023   Encounter department: [facility]     1. Preoperative clearance  Assessment & Plan:  She has not gone for labs yet but labs done in May were reviewed  Unless labs are significantly abnormal from prior, she is at acceptable risk to proceed with surgery  EKG done in office today for a baseline- NSR, no ischemic changes    Orders:  -     POCT ECG    2. HTN (hypertension), benign  Assessment & Plan:  BP is well controlled on current regimen  Continue losartan 50 mg daily      3. Mixed hyperlipidemia  Assessment & Plan:  Diet controlled  Monitor yearly      4. Generalized anxiety disorder  Assessment & Plan:  Well controlled on Effexor   Uses Xanax rarely    Orders:  -     venlafaxine (EFFEXOR-XR) 75 mg 24 hr capsule; Take 1 capsule (75 mg total) by mouth daily    5. Primary hypertension  -     losartan (COZAAR) 50 mg tablet; Take 1 tablet (50 mg total) by mouth daily at bedtime    6. Idiopathic atrophic hypothyroidism  Assessment & Plan:  Continue levothyroxine at current dose  Monitor yearly     Orders:  -     levothyroxine 75 mcg tablet; Take 1 tablet (75 mcg total) by mouth daily    7. Toe osteomyelitis, right [facility])  Assessment & Plan:  Follows with podiatry and ID  Biopsy planned followed by probably antibiotics           Subjective     Steven Gage is a 59-year-old female with a history of hypertension who is here for preoperative clearance. She is having a biopsy of her left toe on  with \[facility]"" [facility]. States that 2 years ago she recalls dropping a heavy weight on her left toe. Since then her left toe has always been bigger than the right toe. She has periodic pain as well and her nail is thicker. She saw multiple podiatrist consensus was that it was first a fungal issue. Despite treatment symptoms persisted. She eventually got an MRI of her foot which showed osteomyelitis and cellulitis. She had the option of doing a biopsy and figuring out antibiotic treatment versus partial amputation. She saw infectious disease as well and has decided to do the biopsy route. Denies any prior problems with anesthesia. Denies any problem with easy bleeding or bruising. She is able to go up a set of stairs without any chest pain or difficulty breathing. Review of Systems   Constitutional: Negative for chills and fever. HENT: Negative for congestion and sore throat. Respiratory: Negative for cough and shortness of breath. Cardiovascular: Negative for chest pain and leg swelling. Gastrointestinal: Negative for abdominal pain, blood in stool and diarrhea. Genitourinary: Negative for dysuria and frequency. Skin: Negative for rash and wound. Neurological: Negative for headaches. Hematological: Negative for adenopathy. Does not bruise/bleed easily.        Past Medical History:   Diagnosis Date   • Acute non-recurrent frontal sinusitis 3/13/2023   • Allergic rhinitis    • Disease of thyroid gland     hypothyroidism   • HL (hearing loss)    • Hypertension    • Liver hemangioma    • AMY (obstructive sleep apnea) 1/16/2023   • Tinnitus      Past Surgical History:   Procedure Laterality Date   • COLONOSCOPY  07/15/2022   • KNEE SURGERY Left 12/2006   • RECTAL PROLAPSE REPAIR     • RECTAL SURGERY      prolapsed rectum repair x2   • WISDOM TOOTH EXTRACTION       Family History   Problem Relation Age of Onset   • Hypertension Mother    • Hyperlipidemia Mother    • Dementia Mother    • Anxiety disorder Mother    • Heart attack Father         x2   • Hypertension Father    • Hyperlipidemia Father    • Stroke Father    • Skin cancer Father    • Migraines Sister    • No Known Problems Sister    • Breast cancer Maternal Grandmother    • Migraines Brother    • No Known Problems Son    • No Known Problems Son    • No Known Problems Daughter    • No Known Problems Maternal Aunt    • No Known Problems Maternal Aunt    • No Known Problems Paternal Aunt    • No Known Problems Paternal Aunt    • Breast cancer Paternal Aunt 79     Social History     Socioeconomic History   • Marital status:      Spouse name: None   • Number of children: None   • Years of education: None   • Highest education level: None   Occupational History   • None   Tobacco Use   • Smoking status: Former     Packs/day: 0.25     Types: Cigarettes     Quit date: 1994     Years since quittin.6   • Smokeless tobacco: Never   • Tobacco comments:     social cigarette use   Vaping Use   • Vaping Use: Former   Substance and Sexual Activity   • Alcohol use: Yes     Comment: holiday only-twice/year   • Drug use: Never   • Sexual activity: Not Currently     Partners: Male     Birth control/protection: None   Other Topics Concern   • None   Social History Narrative   • None     Social Determinants of Health     Financial Resource Strain: Not on file   Food Insecurity: Not on file   Transportation Needs: Not on file   Physical Activity: Not on file   Stress: Not on file   Social Connections: Not on file   Intimate Partner Violence: Not on file   Housing Stability: Not on file     Current Outpatient Medications on File Prior to Visit   Medication Sig   • ALPRAZolam (XANAX) 0.25 mg tablet Daily   • Cholecalciferol 125 MCG (5000 UT) capsule Daily   • Cyanocobalamin (VITAMIN B-12 PO) Take by mouth   • fluticasone (FLONASE) 50 mcg/act nasal spray 2 sprays into each nostril daily As needed   • meclizine (ANTIVERT) 12.5 MG tablet Take 12.5 mg by mouth if needed for dizziness   • [DISCONTINUED] levothyroxine 75 mcg tablet TAKE ONE TABLET BY MOUTH EVERY DAY AS DIRECTED   • [DISCONTINUED] losartan (COZAAR) 50 mg tablet TAKE ONE TABLET BY MOUTH EVERY DAY AT BEDTIME   • Camrese Lo 0.1-0.02 & 0.01 MG TABS TAKE ONE TABLET BY MOUTH EVERY DAY (Patient not taking: Reported on 8/16/2023)   • [DISCONTINUED] ondansetron (ZOFRAN-ODT) 4 mg disintegrating tablet  (Patient not taking: Reported on 7/20/2023)   • [DISCONTINUED] venlafaxine (EFFEXOR-XR) 75 mg 24 hr capsule TAKE ONE CAPSULE BY MOUTH EVERY DAY     Allergies   Allergen Reactions   • Sertraline Hives and Rash     Immunization History   Administered Date(s) Administered   • COVID-19 PFIZER VACCINE 0.3 ML IM 01/13/2021, 02/03/2021, 12/08/2021   • INFLUENZA 11/28/2007, 11/24/2008   • Influenza Quadrivalent 3 years and older 09/08/2020, 09/08/2020   • Influenza, injectable, quadrivalent, preservative free 0.5 mL 09/08/2020       Objective     /80 (BP Location: Right arm, Patient Position: Sitting, Cuff Size: Standard)   Pulse 61   Temp 98.4 °F (36.9 °C) (Tympanic)   Ht 5' 3" (1.6 m)   Wt 63 kg (139 lb)   LMP  (LMP Unknown)   SpO2 99%   BMI 24.62 kg/m²     Physical Exam  Vitals reviewed. Constitutional:       General: She is not in acute distress. HENT:      Right Ear: External ear normal.      Left Ear: External ear normal.      Nose: Nose normal.      Mouth/Throat:      Mouth: Mucous membranes are moist.   Eyes:      Conjunctiva/sclera: Conjunctivae normal.   Cardiovascular:      Rate and Rhythm: Normal rate and regular rhythm. Heart sounds: No murmur heard. Pulmonary:      Effort: Pulmonary effort is normal. No respiratory distress. Breath sounds: No wheezing. Abdominal:      General: There is no distension. Palpations: Abdomen is soft. Tenderness: There is no abdominal tenderness. Musculoskeletal:      Right lower leg: No edema. Left lower leg: No edema. Comments: Left toe > in size than Right; thickened nail as well distally, no erythema noted   Lymphadenopathy:      Cervical: No cervical adenopathy. Neurological:      Mental Status: She is alert and oriented to person, place, and time.    Psychiatric:         Mood and Affect: Mood normal.       Radha Gardner MD

## 2023-08-16 NOTE — ASSESSMENT & PLAN NOTE
She has not gone for labs yet but labs done in May were reviewed  Unless labs are significantly abnormal from prior, she is at acceptable risk to proceed with surgery  EKG done in office today for a baseline- NSR, no ischemic changes

## 2023-08-23 ENCOUNTER — TELEPHONE (OUTPATIENT)
Dept: FAMILY MEDICINE CLINIC | Facility: CLINIC | Age: 53
End: 2023-08-23

## 2023-08-23 NOTE — TELEPHONE ENCOUNTER
LM  - Hi, good afternoon. This is [facility] calling from [facility]. Pre admission testing on August 23rd about 3:45 patient [id number] [facility] date of birth 1970. She was seen by Doctor Marcos Hernandes on August 16th for medical clearance because she is having a Podiatry surgery. I just need the EKG tracing please that was completed at that visit. So if you could please fax that EKG tracing to [phone]. That is pre admission testing and we'll scan it into the chart please. Just wanted to call and if you have any questions regarding this message, please feel free to call [phone]. Alright, I'll document that we've left a message. Thank you so much once again. Just looking for an EKG tracing on [facility]. She had a visit with Doctor Marcos Hernandes as of August 16th and it was a medical clearance. So please fax the EKG to us. All right. Thanks so much. Bye, bye.    -- Patient EKG trace was faxed to the number provider in the voicemail , and confirmation was received.

## 2023-08-24 NOTE — PRE-PROCEDURE INSTRUCTIONS
Pre-Surgery Instructions:   Medication Instructions   • ALPRAZolam (XANAX) 0.25 mg tablet Uses PRN- OK to take day of surgery   • Cholecalciferol 125 MCG (5000 UT) capsule Stop taking 5 days prior to surgery. • Cyanocobalamin (VITAMIN B-12 PO) Stop taking 5 days prior to surgery. • fluticasone (FLONASE) 50 mcg/act nasal spray Uses PRN- OK to take day of surgery   • levothyroxine 75 mcg tablet Take day of surgery. • losartan (COZAAR) 50 mg tablet Hold day of surgery. • meclizine (ANTIVERT) 12.5 MG tablet Uses PRN- OK to take day of surgery   • venlafaxine (EFFEXOR-XR) 75 mg 24 hr capsule Take day of surgery. Medication instructions for day surgery reviewed. Please use only a sip of water to take your instructed medications. Avoid all over the counter vitamins, supplements and NSAIDS for one week prior to surgery per anesthesia guidelines. Tylenol is ok to take as needed. You will receive a call one business day prior to surgery with an arrival time and hospital directions. If your surgery is scheduled on a Monday, the hospital will be calling you on the Friday prior to your surgery. If you have not heard from anyone by 8pm, please call the hospital supervisor through the hospital  at [phone]. [place] [phone]). Do not eat or drink anything after midnight the night before your surgery, including candy, mints, lifesavers, or chewing gum. Do not drink alcohol 24hrs before your surgery. Try not to smoke at least 24hrs before your surgery. Follow the pre surgery showering instructions as listed in the [facility] Surgical Experience Booklet” or otherwise provided by your surgeon's office. Do not shave the surgical area 24 hours before surgery. Do not apply any lotions, creams, including makeup, cologne, deodorant, or perfumes after showering on the day of your surgery. No contact lenses, eye make-up, or artificial eyelashes.  Remove nail polish, including gel polish, and any artificial, gel, or acrylic nails if possible. Remove all jewelry including rings and body piercing jewelry. Wear causal clothing that is easy to take on and off. Consider your type of surgery. Keep any valuables, jewelry, piercings at home. Please bring any specially ordered equipment (sling, braces) if indicated. Arrange for a responsible person to drive you to and from the hospital on the day of your surgery. Visitor Guidelines discussed. Call the surgeon's office with any new illnesses, exposures, or additional questions prior to surgery. Please reference your [facility] Surgical Experience Booklet” for additional information to prepare for your upcoming surgery.

## 2023-08-28 ENCOUNTER — ANESTHESIA EVENT (OUTPATIENT)
Dept: PERIOP | Facility: HOSPITAL | Age: 53
End: 2023-08-28
Payer: COMMERCIAL

## 2023-08-29 ENCOUNTER — APPOINTMENT (OUTPATIENT)
Dept: RADIOLOGY | Facility: HOSPITAL | Age: 53
End: 2023-08-29
Payer: COMMERCIAL

## 2023-08-29 ENCOUNTER — HOSPITAL ENCOUNTER (OUTPATIENT)
Facility: HOSPITAL | Age: 53
Setting detail: OUTPATIENT SURGERY
Discharge: HOME | End: 2023-08-29
Attending: STUDENT IN AN ORGANIZED HEALTH CARE EDUCATION/TRAINING PROGRAM | Admitting: STUDENT IN AN ORGANIZED HEALTH CARE EDUCATION/TRAINING PROGRAM
Payer: COMMERCIAL

## 2023-08-29 ENCOUNTER — ANESTHESIA (OUTPATIENT)
Dept: PERIOP | Facility: HOSPITAL | Age: 53
End: 2023-08-29
Payer: COMMERCIAL

## 2023-08-29 VITALS
DIASTOLIC BLOOD PRESSURE: 75 MMHG | SYSTOLIC BLOOD PRESSURE: 140 MMHG | WEIGHT: 137.13 LBS | OXYGEN SATURATION: 99 % | BODY MASS INDEX: 24.3 KG/M2 | RESPIRATION RATE: 16 BRPM | TEMPERATURE: 97.4 F | HEART RATE: 59 BPM | HEIGHT: 63 IN

## 2023-08-29 DIAGNOSIS — M86.9 OSTEOMYELITIS, UNSPECIFIED (HCC): ICD-10-CM

## 2023-08-29 LAB
EXT PREGNANCY TEST URINE: NEGATIVE
EXT. CONTROL: NORMAL

## 2023-08-29 PROCEDURE — 81025 URINE PREGNANCY TEST: CPT | Performed by: STUDENT IN AN ORGANIZED HEALTH CARE EDUCATION/TRAINING PROGRAM

## 2023-08-29 PROCEDURE — 87070 CULTURE OTHR SPECIMN AEROBIC: CPT | Performed by: STUDENT IN AN ORGANIZED HEALTH CARE EDUCATION/TRAINING PROGRAM

## 2023-08-29 PROCEDURE — 73630 X-RAY EXAM OF FOOT: CPT

## 2023-08-29 PROCEDURE — 87075 CULTR BACTERIA EXCEPT BLOOD: CPT | Performed by: STUDENT IN AN ORGANIZED HEALTH CARE EDUCATION/TRAINING PROGRAM

## 2023-08-29 PROCEDURE — 88307 TISSUE EXAM BY PATHOLOGIST: CPT | Performed by: PATHOLOGY

## 2023-08-29 PROCEDURE — 87205 SMEAR GRAM STAIN: CPT | Performed by: STUDENT IN AN ORGANIZED HEALTH CARE EDUCATION/TRAINING PROGRAM

## 2023-08-29 PROCEDURE — 87176 TISSUE HOMOGENIZATION CULTR: CPT | Performed by: STUDENT IN AN ORGANIZED HEALTH CARE EDUCATION/TRAINING PROGRAM

## 2023-08-29 PROCEDURE — 88311 DECALCIFY TISSUE: CPT | Performed by: PATHOLOGY

## 2023-08-29 RX ORDER — HYDROMORPHONE HCL/PF 1 MG/ML
0.5 SYRINGE (ML) INJECTION
Status: DISCONTINUED | OUTPATIENT
Start: 2023-08-29 | End: 2023-08-29 | Stop reason: HOSPADM

## 2023-08-29 RX ORDER — LIDOCAINE HYDROCHLORIDE 10 MG/ML
INJECTION, SOLUTION EPIDURAL; INFILTRATION; INTRACAUDAL; PERINEURAL AS NEEDED
Status: DISCONTINUED | OUTPATIENT
Start: 2023-08-29 | End: 2023-08-29

## 2023-08-29 RX ORDER — ONDANSETRON 2 MG/ML
INJECTION INTRAMUSCULAR; INTRAVENOUS AS NEEDED
Status: DISCONTINUED | OUTPATIENT
Start: 2023-08-29 | End: 2023-08-29

## 2023-08-29 RX ORDER — PROPOFOL 10 MG/ML
INJECTION, EMULSION INTRAVENOUS AS NEEDED
Status: DISCONTINUED | OUTPATIENT
Start: 2023-08-29 | End: 2023-08-29

## 2023-08-29 RX ORDER — PROPOFOL 10 MG/ML
INJECTION, EMULSION INTRAVENOUS CONTINUOUS PRN
Status: DISCONTINUED | OUTPATIENT
Start: 2023-08-29 | End: 2023-08-29

## 2023-08-29 RX ORDER — CEFAZOLIN SODIUM 2 G/50ML
2000 SOLUTION INTRAVENOUS ONCE
Status: COMPLETED | OUTPATIENT
Start: 2023-08-29 | End: 2023-08-29

## 2023-08-29 RX ORDER — SODIUM CHLORIDE 9 MG/ML
125 INJECTION, SOLUTION INTRAVENOUS CONTINUOUS
Status: DISCONTINUED | OUTPATIENT
Start: 2023-08-29 | End: 2023-08-29 | Stop reason: HOSPADM

## 2023-08-29 RX ORDER — MIDAZOLAM HYDROCHLORIDE 2 MG/2ML
INJECTION, SOLUTION INTRAMUSCULAR; INTRAVENOUS AS NEEDED
Status: DISCONTINUED | OUTPATIENT
Start: 2023-08-29 | End: 2023-08-29

## 2023-08-29 RX ORDER — BUPIVACAINE HYDROCHLORIDE 5 MG/ML
INJECTION, SOLUTION EPIDURAL; INTRACAUDAL AS NEEDED
Status: DISCONTINUED | OUTPATIENT
Start: 2023-08-29 | End: 2023-08-29 | Stop reason: HOSPADM

## 2023-08-29 RX ORDER — PROMETHAZINE HYDROCHLORIDE 25 MG/ML
6.25 INJECTION, SOLUTION INTRAMUSCULAR; INTRAVENOUS ONCE AS NEEDED
Status: DISCONTINUED | OUTPATIENT
Start: 2023-08-29 | End: 2023-08-29 | Stop reason: HOSPADM

## 2023-08-29 RX ORDER — ONDANSETRON 2 MG/ML
4 INJECTION INTRAMUSCULAR; INTRAVENOUS ONCE AS NEEDED
Status: DISCONTINUED | OUTPATIENT
Start: 2023-08-29 | End: 2023-08-29 | Stop reason: HOSPADM

## 2023-08-29 RX ORDER — MEPERIDINE HYDROCHLORIDE 25 MG/ML
12.5 INJECTION INTRAMUSCULAR; INTRAVENOUS; SUBCUTANEOUS ONCE AS NEEDED
Status: DISCONTINUED | OUTPATIENT
Start: 2023-08-29 | End: 2023-08-29 | Stop reason: HOSPADM

## 2023-08-29 RX ORDER — FENTANYL CITRATE/PF 50 MCG/ML
50 SYRINGE (ML) INJECTION
Status: DISCONTINUED | OUTPATIENT
Start: 2023-08-29 | End: 2023-08-29 | Stop reason: HOSPADM

## 2023-08-29 RX ADMIN — ONDANSETRON 4 MG: 2 INJECTION INTRAMUSCULAR; INTRAVENOUS at 13:04

## 2023-08-29 RX ADMIN — SODIUM CHLORIDE 125 ML/HR: 0.9 INJECTION, SOLUTION INTRAVENOUS at 11:04

## 2023-08-29 RX ADMIN — LIDOCAINE HYDROCHLORIDE 100 MG: 10 INJECTION, SOLUTION EPIDURAL; INFILTRATION; INTRACAUDAL; PERINEURAL at 12:58

## 2023-08-29 RX ADMIN — PROPOFOL 120 MCG/KG/MIN: 10 INJECTION, EMULSION INTRAVENOUS at 12:58

## 2023-08-29 RX ADMIN — MIDAZOLAM 2 MG: 1 INJECTION INTRAMUSCULAR; INTRAVENOUS at 12:52

## 2023-08-29 RX ADMIN — CEFAZOLIN SODIUM 2000 MG: 2 SOLUTION INTRAVENOUS at 12:59

## 2023-08-29 RX ADMIN — PROPOFOL 30 MG: 10 INJECTION, EMULSION INTRAVENOUS at 12:58

## 2023-08-29 NOTE — OP NOTE
OPERATIVE REPORT  PATIENT NAME: Lindsey Nathan    :  1970  MRN: [id number]  Pt Location: [facility] OR ROOM 02    SURGERY DATE: 2023    Surgeon(s) and Role:     * Klever Rosales DPM - Primary     * Jane Segura DPM - Assisting    Preop Diagnosis:  Osteomyelitis, unspecified ([address]) [M86.9]    Post-Op Diagnosis Codes:     * Osteomyelitis, unspecified ([address]) [M86.9]    Procedure(s):  Left - HALLUX BONE BIOPSY    Specimen(s):  ID Type Source Tests Collected by Time Destination   1 : left distal phalanx for pathology Tissue Foot, Left TISSUE EXAM Klever Rosales DPM 2023 1317    A : left distal phlanx bone for culture Tissue Foot, Left ANAEROBIC CULTURE AND GRAM STAIN Klever Rosales DPM 2023 1322    B : left distal phalanax bone for culture Tissue Foot, Left CULTURE, TISSUE AND GRAM STAIN Klever Rosales [facility] 2023 1326        Estimated Blood Loss:   Minimal    Drains:  * No LDAs found *    Anesthesia Type:   Choice    Operative Indications:  Osteomyelitis, unspecified ([address]) [M86.9]    Operative Findings:  Plan to follow up aerobic, anaerobic and pathology bone cultures of left hallux distal phalanx    Complications:   None    Procedure and Technique:  Patient was brought back to the operating room under light sedation and transferred to the operating room table in the supine position. After anesthesia was administered a preinjection timeout was complete with all parties in agreement. Approximately 6 cc of 1% lidocaine and 0.5% Marcaine was injected as a local block to the left lower extremity. The left foot was then prepped and draped in normal sterile manner. A preincision timeout was then complete with all parties in agreement    Attention was then drawn to the left first toe. An Esmarch bandage was utilized as a tourniquet around the metatarsal phalangeal joint.   A small 1 cm stab incision was made down to bone on the medial aspect of the big toe overlying the distal phalanx. Dissection was carried down utilizing a hemostat. The distal phalanx was then visualized. Lysing a Jamshidi needle 3 bone samples were taken of the distal phalanx and passed off the table for further examination. The incision was then copiously flushed utilizing sterile saline. Closure consisted of 4-0 nylon in a simple suture fashion. The tourniquet was removed from the toe and immediate hyperemic flush was noted to the digit. An additional local block consisting of 10 cc of 0.5% Marcaine was injected to the left lower extremity. Dressings consisted of Xeroform 4 x 4 gauze and Coban. Patient tolerated procedure well with no immediate complications from anesthesia or surgery. Dr. Taco Pang was present for the entire procedure.     Patient Disposition:  PACU         SIGNATURE: Christiano Rae DPM  DATE: August 29, 2023  TIME: 1:38 PM

## 2023-08-29 NOTE — ANESTHESIA POSTPROCEDURE EVALUATION
Post-Op Assessment Note    CV Status:  Stable    Pain management: adequate     Mental Status:  Alert and awake   Hydration Status:  Euvolemic   PONV Controlled:  Controlled   Airway Patency:  Patent      Post Op Vitals Reviewed: Yes      Staff: Anesthesiologist         No notable events documented.     BP      Temp      Pulse     Resp      SpO2      /88   Pulse 63   Temp 97.9 °F (36.6 °C) (Temporal)   Resp 16   Ht 5' 3" (1.6 m)   Wt 62.2 kg (137 lb 2 oz)   LMP  (LMP Unknown)   SpO2 100%   BMI 24.29 kg/m²

## 2023-08-29 NOTE — DISCHARGE INSTR - AVS FIRST PAGE
[address] [facility]  [phone]    Tri Snyder was under my care for a procedure on 8/29/2023. Please allow patient to remain out of work until 9/5/23 due to left foot surgery. Patient is to be allowed to wear a surgical shoe on the affected foot. She is able to bear weight. She would be allowed to sit for at least 50% of the day until her sutures are removed. Please do not hesitate to call with any questions      [address]  Post-Operative Instructions    1. You may take ibuprofen or tylenol for pain management   2. Upon arrival at home, lie down and elevate your surgical foot on 2 pillows. 3. Remain quiet, off your feet as much as possible, for the first 24-48 hours. This is when your feet first swell and may become painful. After 48 hours you may begin limited walking following these restrictions:weight bearing as tolerated in a surgical shoe   4. Drink large quantities of water. Consume no alcohol. Continue a well-balanced diet. 5. Report any unusual discomfort or fever to this office. 6. A limited amount of discomfort and swelling is to be expected. In some cases the skin may take on a bruised appearance. The surgical solution that was applied to your foot prior to the operation is dark in color and the operation site may appear to be oozing when it actually is not. 7. A slight amount of blood is to be expected, and is no cause for alarm. Do not remove the dressings. If there is active bleeding and if the bleeding persists, add additional gauze to the bandage, apply direct pressure, elevate your feet and call this office. 8. Do not get the dressings wet. As regular bathing may be inconvenient, sponge baths are recommended. If you shower, make sure the dressing stays dry. 9. When anesthesia wears off and if any discomfort should be present, apply an ice pack directly over the operated area for 15 minute intervals for several hours or until the pain leaves.  (USE IN EXCESS OF 15 MINUTES COULD CAUSE FROSTBITE). Do not use hot water bags or electric pads. A convenient icepack can be made by placing ice cubes in a plastic bag and covering this with a towel. 10. If necessary, take a mild laxative before retiring. 11. Wear your special open shoes anytime you put weight on your foot, even if it is just to walk to the bathroom and back. It will probably be 2 or 3 weeks before you will be permitted to try regular shoes. 12. Having performed the operation, we are interested in a prompt recovery. Please cooperate by following the above instructions. 13. Please call to confirm your post-op appointment or call with any other questions.

## 2023-08-29 NOTE — ANESTHESIA PREPROCEDURE EVALUATION
Procedure:  HALLUX BONE BIOPSY (Left: Toe)    Relevant Problems   CARDIO   (+) HTN (hypertension), benign   (+) Mixed hyperlipidemia      ENDO   (+) Idiopathic atrophic hypothyroidism      NEURO/PSYCH   (+) Chronic left shoulder pain   (+) Generalized anxiety disorder      PULMONARY   (+) AMY (obstructive sleep apnea)      Nervous and Auditory   (+) HL (hearing loss)      Other   (+) Toe osteomyelitis, right (HCC)   (+) Tonsillolith   (+) Vertigo        Physical Exam    Airway    Mallampati score: II  TM Distance: >3 FB  Neck ROM: full     Dental   No notable dental hx     Cardiovascular  Rhythm: regular, Rate: normal, Cardiovascular exam normal    Pulmonary  Pulmonary exam normal Breath sounds clear to auscultation,     Other Findings        Anesthesia Plan  ASA Score- 2     Anesthesia Type- IV sedation with anesthesia with ASA Monitors. Additional Monitors:   Airway Plan:     Comment: GA prn. Plan Factors-    Chart reviewed. Existing labs reviewed. Patient summary reviewed. Patient is not a current smoker. Patient not instructed to abstain from smoking on day of procedure. Patient did not smoke on day of surgery. There is medical exclusion for perioperative obstructive sleep apnea risk education. Induction- intravenous. Postoperative Plan-     Informed Consent- Anesthetic plan and risks discussed with patient.

## 2023-08-29 NOTE — DISCHARGE SUMMARY
PODIATRY DISCHARGE SUMMARY     Patient Name: Camilla Donis   Age & Sex: 47 y.o. female   MRN: [id number]  Unit/Bed#: OR POOL   Encounter: [id number]  Length of Stay: 0 days    Active Problems:    HL (hearing loss)      HPI from Admission:  Patient to be admitted for same-day surgery of left hallux bone biopsy    Nolberto Banks is a 47 y.o. female who was admitted on 8/29/2023 for same-day surgery of left hallux bone biopsy. Patient underwent procedure with no immediate complications and was discharged following being PACU criteria    DISCHARGE INFORMATION     PCP at Discharge: Raj Galindo MD    Admitting Provider: Dr. Ulices Suazo  Admission Date: 8/29/2023     Discharge Provider: Dr. Mc Singh  Discharge Date: 08/29/23    Discharge Disposition: Home  Discharge Condition: Good  Discharge with Lines: No  Discharge Diet: regular  Activity Restrictions: WBAT  Test Results Pending at Discharge: None  Medications at Discharge: See after visit summary for reconciled discharge medications provided to patient and family. Discharge Diagnoses: Active Problems:    HL (hearing loss)      Consulting Providers:  none    Diagnostic Imaging Performed:  No results found. Procedures Performed:  Procedure(s):  HALLUX BONE BIOPSY      Code Status: No Order  Advance Directive and Living Will:      Power of :    POLST:      FOLLOW-UP     PCP Outpatient Follow-up:  Yes    Consulting Providers Follow-up:  none    Active Issues Requiring Follow-Up:  none    Discharge Statement:  I spent 25 minutes discharging the patient. This time was spent on the day of discharge. I had direct contact with the patient on the day of discharge. Additional documentation is required if more than 30 minutes were spent on discharge. Portions of the record may have been created with voice recognition software.   Occasional wrong word or "sound a like" substitutions may have occurred due to the inherent limitations of voice recognition software.   Read the chart carefully and recognize, using context, where substitutions have occurred.  ==  Erendira Paul, [address] [facility]  Podiatric Medicine & Surgery

## 2023-09-01 LAB
BACTERIA SPEC ANAEROBE CULT: NO GROWTH
BACTERIA TISS AEROBE CULT: NO GROWTH
GRAM STN SPEC: NORMAL

## 2023-09-01 PROCEDURE — 88311 DECALCIFY TISSUE: CPT | Performed by: PATHOLOGY

## 2023-09-01 PROCEDURE — 88307 TISSUE EXAM BY PATHOLOGIST: CPT | Performed by: PATHOLOGY

## 2023-09-07 DIAGNOSIS — M86.172 OTHER ACUTE OSTEOMYELITIS OF LEFT FOOT (HCC): Primary | ICD-10-CM

## 2023-09-20 NOTE — PROGRESS NOTES
Consultation - Infectious Disease   Padmini Wasserman 47 y.o. female MRN: [id number]  Unit/Bed#:  Encounter: [id number]      IMPRESSION & RECOMMENDATIONS:     1. Chronic Left Great Toe Swelling, abnormal MRI of distal phalanx:  -Patient has had intermittent pain and swelling since sustaining traumatic injury about 2 years ago where she dropped a weight on the toe. While her MRIs on 6/23 and 7/19 did note diffuse bone marrow edema and enhancement in the distal phalanx of great toe with mild T1 marrow signal hypointensity, her bone pathology was negative for osteomyelitis and the bone cultures were negative. At this time I have a lower concern for a true bone infection given these results. Perhaps her MRI findings are related to chronic bone remodeling after her traumatic injury. Furthermore, I would have expected progressive symptoms over the last 2 years if there was an infectious process not being treated. She has no current systemic signs of infection and no chronic wounds/sinus tracts on exam to suggest underlying bone infection.   -at this time, given there is not a clear established diagnosis of infectious osteomyelitis and her symptoms are stable, would not recommend antibiotic therapy  -I would also like to confirm an infectious cause prior to initiating treatment so that we do not unnecessarily expose patient to antimicrobials which may have side effects including nephrotoxicity, C.  Diff infection and resistance   -discussed both with patient and her podiatry team that next steps for further evaluation for infection would include repeat bone biopsy for pathology (with multiple samples if possible) and sending cultures including aerobic, anaerobic, fungal and AFB  -she can be re-referred back to ID if this is performed and findings are suggestive of infection  -I also counseled patient to call our office should she develop new symptoms concerning for infection including a non-healing wound, progressive/increasing pain/swelling, and fever    2. Hypothyroidism  -This is currently controlled on home levothyroxine, follows with her primary care provider for management. 3.  Hypertension  -Currently well controlled on home medications. I have discussed the above management plan in detail with the primary service    I have performed an extensive review of the medical records in Epic including review of the notes, radiographs, and laboratory results     HISTORY OF PRESENT ILLNESS:  Reason for Consult: swelling of toe    HPI: Sabrina Vigil is a 47y.o. year old female with hypothyroidism, HTN, HLD. Per chart review it appears patient had dropped a weight on her left great toe several years ago and she has had persistent and chronic toe swelling since then. She has seen several podiatrists in the past for examination. He had an MRI done on 6/23 which noted subcutaneous edema along the distal great toe, but no abscess. There was bone marrow edema seen throughout the great toe distal phalanx with decreased T1 marrow signal noted suspicious for osteomyelitis. A follow-up MRI was done on 7/19 which was again similar showing diffuse bone marrow edema and enhancement of the distal phalanx of left great toe. There was initially lower clinical concern for an infectious process but plan was to pursue bone biopsy to establish a definitive diagnosis of true osteomyelitis. He was admitted to [facility] on 8/29 and underwent left hallux bone biopsy and cultures. Both aerobic and anaerobic cultures were negative and pathology was negative for osteomyelitis. Per review of last podiatry note on 9/7 they noted that the incision site was healed and stitches were removed. There was no evidence of acute soft tissue infection. She is now referred to ID clinic for further evaluation    Patient reports that she dropped a 30 pound weight on her left great toe roughly 2 years ago while cleaning her basement.   She was wearing shoes at the time of injury. Denies any open wound, but she did lose her great toenail which eventually grew back. Since this time, she feels that the toe itself is larger compared to the right toe. She also feels it is more sensitive to pain and often is aggravated with walking, certain movements, and with minimal trauma. The toe occasionally does swell at times, but does not turn red and is not warm to touch. She has not noticed any open wounds or drainage from the toe. Denies any fevers or chills. No other traumatic injury to the toe. She thinks overall for the past year plus, her symptoms have remained stable. She has recovered well from her recent bone biopsy without any residual wound. Denies any personal history of autoimmune conditions including psoriasis. No major family history of autoimmune conditions. Denies any major swelling or aches/pains in other joints. REVIEW OF SYSTEMS:  A complete review of systems is negative other than that noted in the HPI.     PAST MEDICAL HISTORY:  Past Medical History:   Diagnosis Date   • Acute non-recurrent frontal sinusitis 03/13/2023   • Allergic rhinitis    • HL (hearing loss)    • Liver hemangioma    • Tinnitus      Past Surgical History:   Procedure Laterality Date   • BONE BIOPSY Left 8/29/2023    Procedure: HALLUX BONE BIOPSY;  Surgeon: Hailey Pérez DPM;  Location: [facility];  Service: Podiatry   • COLONOSCOPY  07/15/2022   • KNEE SURGERY Left 12/2006   • RECTAL PROLAPSE REPAIR     • RECTAL SURGERY      prolapsed rectum repair x2   • WISDOM TOOTH EXTRACTION         FAMILY HISTORY:  Non-contributory    SOCIAL HISTORY:  Social History   Social History     Substance and Sexual Activity   Alcohol Use Yes    Comment: holiday only-twice/year     Social History     Substance and Sexual Activity   Drug Use Never     Social History     Tobacco Use   Smoking Status Former   • Packs/day: 0.25   • Types: Cigarettes   • Quit date: 1/1/1994 • Years since quittin.7   Smokeless Tobacco Never       ALLERGIES:  Allergies   Allergen Reactions   • Sertraline Hives and Rash       MEDICATIONS:  All current active medications have been reviewed. PHYSICAL EXAM:  Vitals:    23 0855   BP: 115/75   Pulse: 77   Resp: 18   Temp: 97.5 °F (36.4 °C)   SpO2: 95%         General Appearance:  Appearing well, nontoxic, and in no distress   Head:  Normocephalic, without obvious abnormality, atraumatic   Eyes:  Conjunctiva pink and sclera anicteric, both eyes   Nose: Nares normal, mucosa normal, no drainage   Throat: Oropharynx moist without lesions   Neck: Supple   Back:   Symmetric   Lungs:   Clear to auscultation bilaterally, respirations unlabored   Chest Wall:  No tenderness or deformity   Heart:  RRR; no murmur, rub or gallop   Abdomen:   Soft, non-tender   Extremities: Left great toe without any wound/ulcer, no erythema, non-tender; thickened great toenail   Skin: No rashes or lesions. No draining wounds noted. Lymph nodes: Cervical, supraclavicular nodes normal   Neurologic: Alert and oriented        LABS, IMAGING, & OTHER STUDIES:  Lab Results:  I have personally reviewed pertinent labs. Imaging Studies:   I have personally reviewed pertinent imaging study reports and images in PACS. Other Studies:   I have personally reviewed pertinent reports.       Savage Davis MD  [facility]

## 2023-09-21 ENCOUNTER — CONSULT (OUTPATIENT)
Dept: INFECTIOUS DISEASES | Facility: CLINIC | Age: 53
End: 2023-09-21
Payer: COMMERCIAL

## 2023-09-21 VITALS
OXYGEN SATURATION: 95 % | SYSTOLIC BLOOD PRESSURE: 115 MMHG | HEART RATE: 77 BPM | DIASTOLIC BLOOD PRESSURE: 75 MMHG | TEMPERATURE: 97.5 F | HEIGHT: 63 IN | BODY MASS INDEX: 25.37 KG/M2 | RESPIRATION RATE: 18 BRPM | WEIGHT: 143.2 LBS

## 2023-09-21 DIAGNOSIS — M86.172 OTHER ACUTE OSTEOMYELITIS OF LEFT FOOT (HCC): ICD-10-CM

## 2023-09-21 PROCEDURE — 99204 OFFICE O/P NEW MOD 45 MIN: CPT | Performed by: INTERNAL MEDICINE

## 2023-10-13 ENCOUNTER — OFFICE VISIT (OUTPATIENT)
Dept: FAMILY MEDICINE CLINIC | Facility: CLINIC | Age: 53
End: 2023-10-13
Payer: COMMERCIAL

## 2023-10-13 VITALS
TEMPERATURE: 97.8 F | HEART RATE: 80 BPM | WEIGHT: 143 LBS | BODY MASS INDEX: 25.34 KG/M2 | HEIGHT: 63 IN | SYSTOLIC BLOOD PRESSURE: 120 MMHG | DIASTOLIC BLOOD PRESSURE: 60 MMHG | OXYGEN SATURATION: 99 %

## 2023-10-13 DIAGNOSIS — M86.9 TOE OSTEOMYELITIS, RIGHT (HCC): ICD-10-CM

## 2023-10-13 DIAGNOSIS — I10 PRIMARY HYPERTENSION: Primary | ICD-10-CM

## 2023-10-13 DIAGNOSIS — E03.4 IDIOPATHIC ATROPHIC HYPOTHYROIDISM: ICD-10-CM

## 2023-10-13 DIAGNOSIS — F41.1 GENERALIZED ANXIETY DISORDER: ICD-10-CM

## 2023-10-13 PROBLEM — Z01.818 PREOPERATIVE CLEARANCE: Status: RESOLVED | Noted: 2023-08-16 | Resolved: 2023-10-13

## 2023-10-13 PROBLEM — Z71.89 HEARING AID CONSULTATION: Status: RESOLVED | Noted: 2021-12-21 | Resolved: 2023-10-13

## 2023-10-13 PROCEDURE — 99213 OFFICE O/P EST LOW 20 MIN: CPT | Performed by: INTERNAL MEDICINE

## 2023-10-13 RX ORDER — LOSARTAN POTASSIUM 50 MG/1
50 TABLET ORAL
Qty: 90 TABLET | Refills: 1 | Status: SHIPPED | OUTPATIENT
Start: 2023-10-13

## 2023-10-13 RX ORDER — VENLAFAXINE HYDROCHLORIDE 75 MG/1
75 CAPSULE, EXTENDED RELEASE ORAL DAILY
Qty: 90 CAPSULE | Refills: 1 | Status: SHIPPED | OUTPATIENT
Start: 2023-10-13

## 2023-10-13 RX ORDER — LEVOTHYROXINE SODIUM 0.07 MG/1
75 TABLET ORAL DAILY
Qty: 90 TABLET | Refills: 1 | Status: SHIPPED | OUTPATIENT
Start: 2023-10-13

## 2023-10-13 NOTE — PROGRESS NOTES
BMI Counseling: Body mass index is 25.33 kg/m². The BMI is above normal. Nutrition recommendations include encouraging healthy choices of fruits and vegetables, moderation in carbohydrate intake, increasing intake of lean protein and reducing intake of cholesterol. Exercise recommendations include exercising 3-5 times per week and strength training exercises. No pharmacotherapy was ordered. Rationale for BMI follow-up plan is due to patient being overweight or obese. Assessment/Plan:       Problem List Items Addressed This Visit          Endocrine    Idiopathic atrophic hypothyroidism     TSH was at goal in May, check yearly  Continue same dose of levothyroxine         Relevant Medications    levothyroxine 75 mcg tablet       Cardiovascular and Mediastinum    Primary hypertension - Primary     Blood pressure is well controlled on current regimen  Continue same meds without changes  Encouraged healthy diet and exercise             Relevant Medications    losartan (COZAAR) 50 mg tablet       Musculoskeletal and Integument    Toe osteomyelitis, right (HCC)     Needs to maybe have a repeat biopsy done  Following with ID and podiatry             Other    Generalized anxiety disorder     Well controlled on Effexor  Refill provided  Continue same dose  Recheck in 6 months         Relevant Medications    venlafaxine (EFFEXOR-XR) 75 mg 24 hr capsule    BMI 25.0-25.9,adult     Concerned about trouble losing weight- likely related to menopause   We discussed diet at length  Encouraged exercise as tolerated              Subjective:     Chief Complaint   Patient presents with    Follow-up     3 month follow up             Patient ID: Earl Colorado is a 47 y.o. female who presents here to follow-up for her chronic medical conditions. She denies any issues with her blood pressure medication. She takes it every day as prescribed. She denies headaches, chest pain, difficulty breathing.   She does not do any formal exercise but tries to stay active and goes for walks with the dogs. She also does not eat much throughout the day but still feels like she is gaining weight. She just snacks on pretzels or something similar at work so that she can take her meds and then will eat anything until late afternoon which will be her dinner. She does not really drink any sugary drinks except the rare soda. Her anxiety is well controlled on her current medications and denies any issues. She had a biopsy for her toe and it did not show anything was so she may need a repeat biopsy. Currently she is in a boot and they are seeing a stabilization will help improve the swelling. If not the plan will be for possible repeat biopsy to determine if she needs antibiotics for osteomyelitis. Patient's past medical history, surgical history, family history, medications, allergies and social history reviewed and updated    Review of Systems   Constitutional:  Negative for chills and fever. HENT:  Negative for congestion, rhinorrhea and sore throat. Eyes:  Negative for visual disturbance. Respiratory:  Negative for cough and shortness of breath. Cardiovascular:  Negative for chest pain. Gastrointestinal:  Negative for abdominal distention, constipation, diarrhea, nausea and vomiting. Endocrine: Negative for polyuria. Genitourinary:  Negative for dysuria and frequency. Musculoskeletal:  Negative for back pain. Skin:  Negative for rash. Neurological:  Negative for headaches. Psychiatric/Behavioral:  Negative for dysphoric mood. All other systems reviewed and are negative. All other ROS negative. Objective:    Vitals:    10/13/23 1417   BP: 120/60   Pulse: 80   Temp: 97.8 °F (36.6 °C)   SpO2: 99%          Physical Exam  Vitals reviewed. Constitutional:       General: She is not in acute distress.   HENT:      Right Ear: External ear normal.      Left Ear: External ear normal.      Nose: Nose normal. Mouth/Throat:      Mouth: Mucous membranes are moist.   Eyes:      Conjunctiva/sclera: Conjunctivae normal.   Cardiovascular:      Rate and Rhythm: Normal rate and regular rhythm. Heart sounds: No murmur heard. Pulmonary:      Effort: Pulmonary effort is normal. No respiratory distress. Breath sounds: No wheezing. Abdominal:      General: There is no distension. Palpations: Abdomen is soft. Tenderness: There is no abdominal tenderness. Musculoskeletal:      Right lower leg: No edema. Left lower leg: No edema. Lymphadenopathy:      Cervical: No cervical adenopathy. Neurological:      Mental Status: She is alert and oriented to person, place, and time. Psychiatric:         Mood and Affect: Mood normal.               Portions of the record may have been created with voice recognition software. Occasional wrong word or "sound a like" substitutions may have occurred due to the inherent limitations of voice recognition software. Read the chart carefully and recognize, using context, where substitutions have occurred.      Issa Denney MD  Internal Medicine and Pediatrics

## 2023-10-13 NOTE — ASSESSMENT & PLAN NOTE
Concerned about trouble losing weight- likely related to menopause   We discussed diet at length  Encouraged exercise as tolerated

## 2023-10-13 NOTE — ASSESSMENT & PLAN NOTE
Blood pressure is well controlled on current regimen  Continue same meds without changes  Encouraged healthy diet and exercise

## 2023-11-20 ENCOUNTER — APPOINTMENT (OUTPATIENT)
Dept: LAB | Facility: AMBULARY SURGERY CENTER | Age: 53
End: 2023-11-20
Payer: COMMERCIAL

## 2023-11-22 ENCOUNTER — CONSULT (OUTPATIENT)
Dept: FAMILY MEDICINE CLINIC | Facility: CLINIC | Age: 53
End: 2023-11-22
Payer: COMMERCIAL

## 2023-11-22 VITALS
BODY MASS INDEX: 25.8 KG/M2 | OXYGEN SATURATION: 97 % | HEIGHT: 63 IN | WEIGHT: 145.6 LBS | DIASTOLIC BLOOD PRESSURE: 80 MMHG | RESPIRATION RATE: 18 BRPM | TEMPERATURE: 97.7 F | HEART RATE: 69 BPM | SYSTOLIC BLOOD PRESSURE: 110 MMHG

## 2023-11-22 DIAGNOSIS — Z01.818 PRE-OP EXAM: Primary | ICD-10-CM

## 2023-11-22 DIAGNOSIS — E78.2 MIXED HYPERLIPIDEMIA: ICD-10-CM

## 2023-11-22 DIAGNOSIS — E03.4 IDIOPATHIC ATROPHIC HYPOTHYROIDISM: ICD-10-CM

## 2023-11-22 DIAGNOSIS — I10 PRIMARY HYPERTENSION: ICD-10-CM

## 2023-11-22 DIAGNOSIS — F41.1 GENERALIZED ANXIETY DISORDER: ICD-10-CM

## 2023-11-22 PROBLEM — R40.0 DAYTIME SLEEPINESS: Status: RESOLVED | Noted: 2023-01-16 | Resolved: 2023-11-22

## 2023-11-22 PROBLEM — J35.8 TONSILLOLITH: Status: RESOLVED | Noted: 2019-05-22 | Resolved: 2023-11-22

## 2023-11-22 PROCEDURE — 93000 ELECTROCARDIOGRAM COMPLETE: CPT | Performed by: INTERNAL MEDICINE

## 2023-11-22 PROCEDURE — 99214 OFFICE O/P EST MOD 30 MIN: CPT | Performed by: INTERNAL MEDICINE

## 2023-11-22 NOTE — ASSESSMENT & PLAN NOTE
Patient is at acceptable risk for surgery, no contraindications to proceed  EKG done today is normal sinus, no ischemic changes

## 2023-11-22 NOTE — PROGRESS NOTES
Name: John Singh      : 1970      MRN: [id number]  Encounter Provider: Chan Bowie MD  Encounter Date: 2023   Encounter department: [address] [place] [address]     1. Pre-op exam  Assessment & Plan:  Patient is at acceptable risk for surgery, no contraindications to proceed  EKG done today is normal sinus, no ischemic changes    Orders:  -     POCT ECG    2. Primary hypertension  Assessment & Plan:  Well controlled on current regimen  Continue same medication on day of surgery with sip of water      3. Idiopathic atrophic hypothyroidism  Assessment & Plan:  TSH is at goal  Continue levothroxine  Take on morning of surgery with sip of water      4. Mixed hyperlipidemia  Assessment & Plan:  Controlled with diet  Check lipids yearly      5. Generalized anxiety disorder  Assessment & Plan:  Well controlled with Effexor  Continue same- ok to hold on day of surgery if needed and take later in the day              Saul Hope is having a toe biopsy and possible partial amputation on 23 with Dr. Binh Pearson. She denies any prior issues with anesthesia. Denies chest pain. Able to up stairs without chest pain or difficulty breathing. 2 years ago she recalls dropping a heavy weight on her left toe. Since then her left toe has always been bigger than the right toe. She has periodic pain as well and her nail is thicker. She saw multiple podiatrist consensus was that it was first a fungal issue. Despite treatment symptoms persisted. She eventually got an MRI of her foot which showed osteomyelitis and cellulitis. She had the option of doing a biopsy and figuring out antibiotic treatment versus partial amputation. She saw infectious disease as well and had decided to do the biopsy route. Biopsy did not show anything so ID wanted her to have repeat biopsy.  She is having this done but is also leaning towards just having a partial amputation because she doesn't want to have another inconclusive biopsy. Review of Systems   Constitutional:  Negative for chills and fever. HENT:  Negative for congestion, rhinorrhea and sore throat. Eyes:  Negative for visual disturbance. Respiratory:  Negative for cough and shortness of breath. Cardiovascular:  Negative for chest pain. Gastrointestinal:  Negative for abdominal distention, constipation, diarrhea, nausea and vomiting. Endocrine: Negative for polyuria. Genitourinary:  Negative for dysuria and frequency. Musculoskeletal:  Negative for back pain. Skin:  Negative for rash. Neurological:  Negative for headaches. Psychiatric/Behavioral:  Negative for dysphoric mood. All other systems reviewed and are negative.       Past Medical History:   Diagnosis Date    Acute non-recurrent frontal sinusitis 03/13/2023    Allergic rhinitis     HL (hearing loss)     Liver hemangioma     Tinnitus      Past Surgical History:   Procedure Laterality Date    BONE BIOPSY Left 8/29/2023    Procedure: HALLUX BONE BIOPSY;  Surgeon: Marisela Camejo DPM;  Location: [facility] OR;  Service: Podiatry    COLONOSCOPY  07/15/2022    KNEE SURGERY Left 12/2006    RECTAL PROLAPSE REPAIR      RECTAL SURGERY      prolapsed rectum repair x2    WISDOM TOOTH EXTRACTION       Family History   Problem Relation Age of Onset    Hypertension Mother     Hyperlipidemia Mother     Dementia Mother     Anxiety disorder Mother     Heart attack Father         x2    Hypertension Father     Hyperlipidemia Father     Stroke Father     Skin cancer Father     Migraines Sister     No Known Problems Sister     Breast cancer Maternal Grandmother     Migraines Brother     No Known Problems Son     No Known Problems Son     No Known Problems Daughter     No Known Problems Maternal Aunt     No Known Problems Maternal Aunt     No Known Problems Paternal Aunt     No Known Problems Paternal Aunt     Breast cancer Paternal Aunt 72     Social History Socioeconomic History    Marital status:      Spouse name: None    Number of children: None    Years of education: None    Highest education level: None   Occupational History    None   Tobacco Use    Smoking status: Former     Packs/day: 0.25     Types: Cigarettes     Quit date: 1994     Years since quittin.9    Smokeless tobacco: Never   Vaping Use    Vaping Use: Former   Substance and Sexual Activity    Alcohol use: Yes     Comment: holiday only-twice/year    Drug use: Never    Sexual activity: Not Currently     Partners: Male     Birth control/protection: None   Other Topics Concern    None   Social History Narrative    None     Social Determinants of Health     Financial Resource Strain: Not on file   Food Insecurity: Not on file   Transportation Needs: Not on file   Physical Activity: Not on file   Stress: Not on file   Social Connections: Not on file   Intimate Partner Violence: Not on file   Housing Stability: Not on file     Current Outpatient Medications on File Prior to Visit   Medication Sig    ALPRAZolam (XANAX) 0.25 mg tablet Daily    Cholecalciferol 125 MCG (5000 UT) capsule Daily    Cyanocobalamin (VITAMIN B-12 PO) Take by mouth    fluticasone (FLONASE) 50 mcg/act nasal spray 2 sprays into each nostril daily As needed    levothyroxine 75 mcg tablet Take 1 tablet (75 mcg total) by mouth daily    losartan (COZAAR) 50 mg tablet Take 1 tablet (50 mg total) by mouth daily at bedtime    meclizine (ANTIVERT) 12.5 MG tablet Take 12.5 mg by mouth if needed for dizziness    venlafaxine (EFFEXOR-XR) 75 mg 24 hr capsule Take 1 capsule (75 mg total) by mouth daily    Camrese Lo 0.1-0.02 & 0.01 MG TABS TAKE ONE TABLET BY MOUTH EVERY DAY (Patient not taking: Reported on 2023)     Allergies   Allergen Reactions    Sertraline Hives and Rash     Immunization History   Administered Date(s) Administered    COVID-19 PFIZER VACCINE 0.3 ML IM 2021, 2021, 2021    INFLUENZA 11/28/2007, 11/24/2008    Influenza Quadrivalent 3 years and older 09/08/2020, 09/08/2020    Influenza, injectable, quadrivalent, preservative free 0.5 mL 09/08/2020       Objective     /80 (BP Location: Left arm, Patient Position: Sitting, Cuff Size: Standard)   Pulse 69   Temp 97.7 °F (36.5 °C) (Tympanic)   Resp 18   Ht 5' 3" (1.6 m)   Wt 66 kg (145 lb 9.6 oz)   SpO2 97%   BMI 25.79 kg/m²     Physical Exam  Vitals reviewed. Constitutional:       General: She is not in acute distress. HENT:      Right Ear: External ear normal.      Left Ear: External ear normal.      Nose: Nose normal.      Mouth/Throat:      Mouth: Mucous membranes are moist.   Eyes:      Conjunctiva/sclera: Conjunctivae normal.   Cardiovascular:      Rate and Rhythm: Normal rate and regular rhythm. Heart sounds: No murmur heard. Pulmonary:      Effort: Pulmonary effort is normal. No respiratory distress. Breath sounds: No wheezing. Abdominal:      General: There is no distension. Palpations: Abdomen is soft. Tenderness: There is no abdominal tenderness. Musculoskeletal:      Right lower leg: No edema. Left lower leg: No edema. Lymphadenopathy:      Cervical: No cervical adenopathy. Neurological:      Mental Status: She is alert and oriented to person, place, and time.    Psychiatric:         Mood and Affect: Mood normal.       Davon Flores MD

## 2023-11-22 NOTE — ASSESSMENT & PLAN NOTE
Well controlled with Effexor  Continue same- ok to hold on day of surgery if needed and take later in the day

## 2023-11-27 NOTE — PRE-PROCEDURE INSTRUCTIONS
Pre-Surgery Instructions:   Medication Instructions    ALPRAZolam (XANAX) 0.25 mg tablet Uses PRN- OK to take day of surgery    Cholecalciferol 125 MCG (5000 UT) capsule Stop taking 1 day prior to surgery. Cyanocobalamin (VITAMIN B-12 PO) Stop taking 1 day prior to surgery. fluticasone (FLONASE) 50 mcg/act nasal spray Uses PRN- OK to take day of surgery    levothyroxine 75 mcg tablet Take day of surgery. losartan (COZAAR) 50 mg tablet Take night before surgery    meclizine (ANTIVERT) 12.5 MG tablet Uses PRN- OK to take day of surgery    venlafaxine (EFFEXOR-XR) 75 mg 24 hr capsule Take day of surgery. Medication instructions for day surgery reviewed. Please use only a sip of water to take your instructed medications. Avoid all over the counter vitamins, supplements and NSAIDS for one week prior to surgery per anesthesia guidelines. Tylenol is ok to take as needed. You will receive a call one business day prior to surgery with an arrival time and hospital directions. If your surgery is scheduled on a Monday, the hospital will be calling you on the Friday prior to your surgery. If you have not heard from anyone by 8pm, please call the hospital supervisor through the hospital  at [phone]. Raymundo Collins [phone]). Do not eat or drink anything after midnight the night before your surgery, including candy, mints, lifesavers, or chewing gum. Do not drink alcohol 24hrs before your surgery. Try not to smoke at least 24hrs before your surgery. Follow the pre surgery showering instructions as listed in the [facility] Surgical Experience Booklet” or otherwise provided by your surgeon's office. Do not use a blade to shave the surgical area 1 week before surgery. It is okay to use a clean electric clippers up to 24 hours before surgery. Do not apply any lotions, creams, including makeup, cologne, deodorant, or perfumes after showering on the day of your surgery.  Do not use dry shampoo, hair spray, hair gel, or any type of hair products. No contact lenses, eye make-up, or artificial eyelashes. Remove nail polish, including gel polish, and any artificial, gel, or acrylic nails if possible. Remove all jewelry including rings and body piercing jewelry. Wear causal clothing that is easy to take on and off. Consider your type of surgery. Keep any valuables, jewelry, piercings at home. Please bring any specially ordered equipment (sling, braces) if indicated. Arrange for a responsible person to drive you to and from the hospital on the day of your surgery. Visitor Guidelines discussed. Call the surgeon's office with any new illnesses, exposures, or additional questions prior to surgery. Please reference your [facility] Surgical Experience Booklet” for additional information to prepare for your upcoming surgery.

## 2023-11-28 ENCOUNTER — ANESTHESIA EVENT (OUTPATIENT)
Dept: PERIOP | Facility: HOSPITAL | Age: 53
End: 2023-11-28
Payer: COMMERCIAL

## 2023-11-29 ENCOUNTER — HOSPITAL ENCOUNTER (OUTPATIENT)
Facility: HOSPITAL | Age: 53
Setting detail: OUTPATIENT SURGERY
Discharge: HOME | End: 2023-11-29
Attending: STUDENT IN AN ORGANIZED HEALTH CARE EDUCATION/TRAINING PROGRAM | Admitting: STUDENT IN AN ORGANIZED HEALTH CARE EDUCATION/TRAINING PROGRAM
Payer: COMMERCIAL

## 2023-11-29 ENCOUNTER — ANESTHESIA (OUTPATIENT)
Dept: PERIOP | Facility: HOSPITAL | Age: 53
End: 2023-11-29
Payer: COMMERCIAL

## 2023-11-29 VITALS
TEMPERATURE: 97.7 F | RESPIRATION RATE: 18 BRPM | HEIGHT: 63 IN | OXYGEN SATURATION: 99 % | BODY MASS INDEX: 26.25 KG/M2 | DIASTOLIC BLOOD PRESSURE: 84 MMHG | HEART RATE: 70 BPM | SYSTOLIC BLOOD PRESSURE: 137 MMHG | WEIGHT: 148.15 LBS

## 2023-11-29 DIAGNOSIS — M86.9 OSTEOMYELITIS, UNSPECIFIED (HCC): ICD-10-CM

## 2023-11-29 PROCEDURE — 87205 SMEAR GRAM STAIN: CPT | Performed by: STUDENT IN AN ORGANIZED HEALTH CARE EDUCATION/TRAINING PROGRAM

## 2023-11-29 PROCEDURE — 87116 MYCOBACTERIA CULTURE: CPT | Performed by: STUDENT IN AN ORGANIZED HEALTH CARE EDUCATION/TRAINING PROGRAM

## 2023-11-29 PROCEDURE — 88311 DECALCIFY TISSUE: CPT | Performed by: PATHOLOGY

## 2023-11-29 PROCEDURE — 88307 TISSUE EXAM BY PATHOLOGIST: CPT | Performed by: PATHOLOGY

## 2023-11-29 PROCEDURE — 87102 FUNGUS ISOLATION CULTURE: CPT | Performed by: STUDENT IN AN ORGANIZED HEALTH CARE EDUCATION/TRAINING PROGRAM

## 2023-11-29 PROCEDURE — 87176 TISSUE HOMOGENIZATION CULTR: CPT | Performed by: STUDENT IN AN ORGANIZED HEALTH CARE EDUCATION/TRAINING PROGRAM

## 2023-11-29 PROCEDURE — 87206 SMEAR FLUORESCENT/ACID STAI: CPT | Performed by: STUDENT IN AN ORGANIZED HEALTH CARE EDUCATION/TRAINING PROGRAM

## 2023-11-29 PROCEDURE — 87070 CULTURE OTHR SPECIMN AEROBIC: CPT | Performed by: STUDENT IN AN ORGANIZED HEALTH CARE EDUCATION/TRAINING PROGRAM

## 2023-11-29 PROCEDURE — 87075 CULTR BACTERIA EXCEPT BLOOD: CPT | Performed by: STUDENT IN AN ORGANIZED HEALTH CARE EDUCATION/TRAINING PROGRAM

## 2023-11-29 RX ORDER — ONDANSETRON 2 MG/ML
4 INJECTION INTRAMUSCULAR; INTRAVENOUS EVERY 4 HOURS PRN
Status: DISCONTINUED | OUTPATIENT
Start: 2023-11-29 | End: 2023-11-29 | Stop reason: HOSPADM

## 2023-11-29 RX ORDER — LIDOCAINE HYDROCHLORIDE 20 MG/ML
INJECTION, SOLUTION EPIDURAL; INFILTRATION; INTRACAUDAL; PERINEURAL AS NEEDED
Status: DISCONTINUED | OUTPATIENT
Start: 2023-11-29 | End: 2023-11-29

## 2023-11-29 RX ORDER — SODIUM CHLORIDE 9 MG/ML
125 INJECTION, SOLUTION INTRAVENOUS CONTINUOUS
Status: DISCONTINUED | OUTPATIENT
Start: 2023-11-29 | End: 2023-11-29 | Stop reason: HOSPADM

## 2023-11-29 RX ORDER — PROPOFOL 10 MG/ML
INJECTION, EMULSION INTRAVENOUS AS NEEDED
Status: DISCONTINUED | OUTPATIENT
Start: 2023-11-29 | End: 2023-11-29

## 2023-11-29 RX ORDER — FENTANYL CITRATE 50 UG/ML
50 INJECTION, SOLUTION INTRAMUSCULAR; INTRAVENOUS
Status: DISCONTINUED | OUTPATIENT
Start: 2023-11-29 | End: 2023-11-29 | Stop reason: HOSPADM

## 2023-11-29 RX ORDER — ONDANSETRON 2 MG/ML
4 INJECTION INTRAMUSCULAR; INTRAVENOUS EVERY 6 HOURS PRN
Status: DISCONTINUED | OUTPATIENT
Start: 2023-11-29 | End: 2023-11-29 | Stop reason: HOSPADM

## 2023-11-29 RX ORDER — ONDANSETRON 2 MG/ML
INJECTION INTRAMUSCULAR; INTRAVENOUS AS NEEDED
Status: DISCONTINUED | OUTPATIENT
Start: 2023-11-29 | End: 2023-11-29

## 2023-11-29 RX ORDER — MAGNESIUM HYDROXIDE 1200 MG/15ML
LIQUID ORAL AS NEEDED
Status: DISCONTINUED | OUTPATIENT
Start: 2023-11-29 | End: 2023-11-29 | Stop reason: HOSPADM

## 2023-11-29 RX ORDER — CEFAZOLIN SODIUM 2 G/50ML
SOLUTION INTRAVENOUS AS NEEDED
Status: DISCONTINUED | OUTPATIENT
Start: 2023-11-29 | End: 2023-11-29

## 2023-11-29 RX ORDER — EPHEDRINE SULFATE 50 MG/ML
INJECTION INTRAVENOUS AS NEEDED
Status: DISCONTINUED | OUTPATIENT
Start: 2023-11-29 | End: 2023-11-29

## 2023-11-29 RX ORDER — FENTANYL CITRATE 50 UG/ML
INJECTION, SOLUTION INTRAMUSCULAR; INTRAVENOUS AS NEEDED
Status: DISCONTINUED | OUTPATIENT
Start: 2023-11-29 | End: 2023-11-29

## 2023-11-29 RX ADMIN — LIDOCAINE HYDROCHLORIDE 100 MG: 20 INJECTION, SOLUTION EPIDURAL; INFILTRATION; INTRACAUDAL at 14:33

## 2023-11-29 RX ADMIN — ONDANSETRON 4 MG: 2 INJECTION INTRAMUSCULAR; INTRAVENOUS at 16:54

## 2023-11-29 RX ADMIN — ONDANSETRON 4 MG: 2 INJECTION INTRAMUSCULAR; INTRAVENOUS at 14:44

## 2023-11-29 RX ADMIN — EPHEDRINE SULFATE 10 MG: 50 INJECTION, SOLUTION INTRAVENOUS at 14:48

## 2023-11-29 RX ADMIN — CEFAZOLIN SODIUM 2000 MG: 2 SOLUTION INTRAVENOUS at 15:05

## 2023-11-29 RX ADMIN — FENTANYL CITRATE 100 MCG: 50 INJECTION INTRAMUSCULAR; INTRAVENOUS at 14:27

## 2023-11-29 RX ADMIN — PROPOFOL 200 MG: 10 INJECTION, EMULSION INTRAVENOUS at 14:33

## 2023-11-29 RX ADMIN — SODIUM CHLORIDE 125 ML/HR: 0.9 INJECTION, SOLUTION INTRAVENOUS at 14:05

## 2023-11-29 NOTE — OP NOTE
OPERATIVE REPORT  PATIENT NAME: Curtis Hernandez    :  1970  MRN: [id number]  Pt Location: [place] [address]    SURGERY DATE: 2023    Surgeon(s) and Role:     * Ivania Toth DPM - Primary     * Evelyn Ortiz DPM - Assisting    Preop Diagnosis:  Osteomyelitis, unspecified ([address]) [M86.9]    Post-Op Diagnosis Codes:     * Osteomyelitis, unspecified ([address]) [M86.9]    Procedure(s):  Left - HALLUX BONE BIOPSY VS AMPUTATION    Specimen(s):  ID Type Source Tests Collected by Time Destination   1 : Distal Phalanx #1 Tissue Bone TISSUE EXAM Erddbeverly Toth [facility] 2023 1511    2 : Distal Phalanx #2 [facility]    A : Distal Phalanx #1 Tissue Bone ANAEROBIC CULTURE AND GRAM STAIN, FUNGAL CULTURE, CULTURE, TISSUE AND GRAM STAIN, AFB CULTURE WITH STAIN Ivania Toth [facility] 2023 1452    B : Distal Phalanx #2 Tissue Bone ANAEROBIC CULTURE AND GRAM STAIN, FUNGAL CULTURE, CULTURE, TISSUE AND GRAM STAIN, AFB CULTURE WITH STAIN [facility] 2023 1507        Estimated Blood Loss:   Minimal    Drains:  * No LDAs found *    Anesthesia Type:   Choice    Operative Indications:  Osteomyelitis, unspecified ([address]) [M86.9]      Operative Findings:  1) bone biopsies of distal phalanx taken  2) distal phalanx appears hard, white. 3) no purulence or acute soft tissue flexion noted. Complications:   None    Procedure and Technique:  Patient was brought back to the operating room under light sedation and transferred to the operative table in the supine position. After anesthesia was administered a preinjection timeout was complete with all parties in agreement. A local block consisting of 7 cc of 0.5% Marcaine was injected to the left lower extremity. The left foot was then prepped and draped in the normal sterile manner. A preincision timeout was then completed with all parties in agreement.      Utilizing an Esmarch bandage the left first toe was exsanguinated, Esmarch bandage being held in place by hemostat. A 3 cm linear incision over the distal tip of the first toe was made down to bone. The distal phalanx was then exposed and all soft tissue was reflected off of it. The distal phalanx appeared hard, white with no acute infection noted. Bone biopsy was then taken and passed off the table for pathological examination. The toe was then copiously flushed utilizing sterile saline. Deep closure was achieved utilizing 4-0 Vicryl. Skin closure was achieved utilizing 4-0 nylon in a horizontal mattress and simple suture type fashion. The Esmarch bandage was then released and immediate hyperemic flush was noted to the toe. A dressing consisting of Xeroform DSD and Coban was then applied to the left foot. Patient tolerated the procedure well with no immediate complications noted. Dr. Mc Singh was present for the entire procedure.     Patient Disposition:  PACU         SIGNATURE: Fariba Jose DPM  DATE: November 29, 2023  TIME: 3:24 PM

## 2023-11-29 NOTE — DISCHARGE SUMMARY
PODIATRY DISCHARGE SUMMARY     Patient Name: Lucien Mccord   Age & Sex: 47 y.o. female   MRN: [id number]  Unit/Bed#: [facility]   Encounter: [id number]  Length of Stay: 0 days    Active Problems: There are no active Hospital Problems. HPI from Admission:  Patient to be admitted for same-day surgery of left great toe bone biopsy    Nolberto Montes [facility] is a 47 y.o. female who was admitted on 11/29/2023 for left great toe bone biopsy. Patient underwent the procedure as advertised and was discharged home following meeting PACU criteria. DISCHARGE INFORMATION     PCP at Discharge: Austyn Virk MD    Admitting Provider: Dr. Glenn Shannon  Admission Date: 11/29/2023     Discharge Provider: Dr. Glnen Shannon  Discharge Date: 11/29/23    Discharge Disposition: Home  Discharge Condition: Good  Discharge with Lines: No  Discharge Diet: regular home  Activity Restrictions: WBAT  Test Results Pending at Discharge: None  Medications at Discharge: See after visit summary for reconciled discharge medications provided to patient and family. Discharge Diagnoses: Active Problems: There are no active Hospital Problems. Consulting Providers:  none    Diagnostic Imaging Performed:  No results found. Procedures Performed:  Procedure(s):  HALLUX BONE BIOPSY VS AMPUTATION      Code Status: No Order  Advance Directive and Living Will:      Power of :    POLST:      FOLLOW-UP     PCP Outpatient Follow-up:  Yes    Consulting Providers Follow-up:  none    Active Issues Requiring Follow-Up:  Bone biopsy     Discharge Statement:  I spent 25 minutes discharging the patient. This time was spent on the day of discharge. I had direct contact with the patient on the day of discharge. Additional documentation is required if more than 30 minutes were spent on discharge. Portions of the record may have been created with voice recognition software.   Occasional wrong word or "sound a like" substitutions may have occurred due to the inherent limitations of voice recognition software.   Read the chart carefully and recognize, using context, where substitutions have occurred.  ==  Erendira Paul, [address] [facility]  Podiatric Medicine & Surgery

## 2023-11-29 NOTE — DISCHARGE INSTR - AVS FIRST PAGE
Dr. Damian Howard Instructions    1. For pain management you may utilize ibuprofen or Tylenol. Per patient request narcotics are not being prescribed. Patient may call the office if she requests narcotics for pain. 2. Upon arrival at home, lie down and elevate your surgical foot on 2 pillows. 3. Remain quiet, off your feet as much as possible, for the first 24-48 hours. This is when your feet first swell and may become painful. After 48 hours you may begin limited walking following these restrictions: weight bearing as tolerated either in a surgical shoe or CAM boot      4. Drink large quantities of water. Consume no alcohol. Continue a well-balanced diet. 5. Report any unusual discomfort or fever to this office. 6. A limited amount of discomfort and swelling is to be expected. In some cases the skin may take on a bruised appearance. The surgical solution that was applied to your foot prior to the operation is dark in color and the operation site may appear to be oozing when it actually is not. 7. A slight amount of blood is to be expected, and is no cause for alarm. Do not remove the dressings. If there is active bleeding and if the bleeding persists, add additional gauze to the bandage, apply direct pressure, elevate your feet and call this office. 8. Do not get the dressings wet. As regular bathing may be inconvenient, sponge baths are recommended. If you shower, make sure the dressing stays dry. 9. When anesthesia wears off and if any discomfort should be present, apply an ice pack directly over the operated area for 15 minute intervals for several hours or until the pain leaves. (USE IN EXCESS OF 15 MINUTES COULD CAUSE FROSTBITE). Do not use hot water bags or electric pads. A convenient icepack can be made by placing ice cubes in a plastic bag and covering this with a towel. 10. If necessary, take a mild laxative before retiring.   11. Wear your special open shoes anytime you put weight on your foot, even if it is just to walk to the bathroom and back. It will probably be 2 or 3 weeks before you will be permitted to try regular shoes. 12. Having performed the operation, we are interested in a prompt recovery. Please cooperate by following the above instructions. 13. Please call to confirm your post-op appointment or call with any other questions.

## 2023-11-29 NOTE — ANESTHESIA PREPROCEDURE EVALUATION
Procedure:  HALLUX BONE BIOPSY VS AMPUTATION (Left: Foot)    Relevant Problems   CARDIO   (+) Mixed hyperlipidemia   (+) Primary hypertension      ENDO   (+) Idiopathic atrophic hypothyroidism      NEURO/PSYCH   (+) Chronic left shoulder pain   (+) Generalized anxiety disorder      PULMONARY   (+) AMY (obstructive sleep apnea)      Other   (+) Toe osteomyelitis, right (HCC)        Physical Exam    Airway    Mallampati score: II  TM Distance: >3 FB  Neck ROM: full     Dental   No notable dental hx     Cardiovascular  Rhythm: regular, Rate: normal, Cardiovascular exam normal    Pulmonary  Pulmonary exam normal Breath sounds clear to auscultation    Other Findings  post-pubertal.      Anesthesia Plan  ASA Score- 2     Anesthesia Type- general with ASA Monitors. Additional Monitors:     Airway Plan:            Plan Factors-    Chart reviewed. Existing labs reviewed. Patient summary reviewed. Patient is not a current smoker. Patient not instructed to abstain from smoking on day of procedure. Patient did not smoke on day of surgery. There is medical exclusion for perioperative obstructive sleep apnea risk education. Induction- intravenous. Postoperative Plan-     Informed Consent- Anesthetic plan and risks discussed with patient.

## 2023-11-29 NOTE — ANESTHESIA POSTPROCEDURE EVALUATION
Post-Op Assessment Note    CV Status:  Stable    Pain management: adequate       Mental Status:  Alert and awake   Hydration Status:  Euvolemic   PONV Controlled:  Controlled   Airway Patency:  Patent     Post Op Vitals Reviewed: Yes    No anethesia notable event occurred.     Staff: Anesthesiologist               /88 (11/29/23 1625)    Temp 98.1 °F (36.7 °C) (11/29/23 1615)    Pulse 65 (11/29/23 1615)   Resp 16 (11/29/23 1615)    SpO2 97 % (11/29/23 1615)

## 2023-11-30 LAB
RHODAMINE-AURAMINE STN SPEC: NORMAL
RHODAMINE-AURAMINE STN SPEC: NORMAL

## 2023-11-30 NOTE — PROGRESS NOTES
[facility]        NAME: Yohan Parker is a 47 y o  female  : 1970    MRN: [id number]  DATE: 2023  TIME: 1:52 PM    Assessment and Plan   Acute non-recurrent frontal sinusitis [J01 10]  1  Acute non-recurrent frontal sinusitis  amoxicillin-clavulanate (AUGMENTIN) 875-125 mg per tablet    benzonatate (TESSALON PERLES) 100 mg capsule            Patient Instructions       Take Augmentin as prescribed, take with food  Take Tessalon as needed for cough  Caution- this can be sedating  Swallow whole, do not crush or chew  Continue Flonase nasal spray daily  Increase your water intake and use nasal saline rinses  Recommend Mucinex as needed  Follow up with your PCP or return to the clinic if symptoms worsen or persist more than 3-5 days  Proceed to  ER if symptoms worsen  Chief Complaint     Chief Complaint   Patient presents with   • Cough         History of Present Illness       Ongoing cough x about 1 month  Throat feels dry and with a "tickle"  R side of throat feels irritated  Nasal congestion, frontal sinus pressure, & sneezing present  Cough wakes her at night, once she starts coughing it is difficult to stop  She had a COVID infection 3 months ago, did not test for COVID recently  No fever or chills  She denies other symptoms  She has tried neti-pot, cough drops, mucinex - mild improvement  Review of Systems   Review of Systems   Constitutional: Negative  Negative for chills, fatigue and fever  HENT: Positive for congestion (nasal), postnasal drip, rhinorrhea, sinus pressure (frontal) and sore throat  Negative for ear discharge, ear pain, hearing loss, sinus pain and trouble swallowing  Eyes: Negative  Respiratory: Positive for cough (dry, irritated throat)  Negative for shortness of breath and wheezing  Cardiovascular: Negative  Negative for chest pain and palpitations  Gastrointestinal: Negative  Musculoskeletal: Negative      Skin: Negative  Negative for rash  All other systems reviewed and are negative          Current Medications       Current Outpatient Medications:   •  ALPRAZolam (XANAX) 0 25 mg tablet, Daily, Disp: , Rfl:   •  amoxicillin-clavulanate (AUGMENTIN) 875-125 mg per tablet, Take 1 tablet by mouth every 12 (twelve) hours for 7 days, Disp: 14 tablet, Rfl: 0  •  benzonatate (TESSALON PERLES) 100 mg capsule, Take 1 capsule (100 mg total) by mouth 3 (three) times a day as needed for cough, Disp: 20 capsule, Rfl: 0  •  Camrese Lo 0 1-0 02 & 0 01 MG TABS, TAKE ONE TABLET BY MOUTH EVERY DAY, Disp: 91 tablet, Rfl: 2  •  Cholecalciferol 125 MCG (5000 UT) capsule, Daily, Disp: , Rfl:   •  Cyanocobalamin (VITAMIN B-12 PO), Take by mouth, Disp: , Rfl:   •  fluticasone (FLONASE) 50 mcg/act nasal spray, 2 sprays into each nostril daily, Disp: , Rfl:   •  levothyroxine 75 mcg tablet, TAKE ONE TABLET BY MOUTH EVERY DAY AS DIRECTED, Disp: 90 tablet, Rfl: 0  •  losartan (COZAAR) 50 mg tablet, TAKE ONE TABLET BY MOUTH EVERY DAY AT BEDTIME, Disp: 90 tablet, Rfl: 0  •  ondansetron (ZOFRAN-ODT) 4 mg disintegrating tablet, , Disp: , Rfl:   •  venlafaxine (EFFEXOR-XR) 75 mg 24 hr capsule, TAKE ONE CAPSULE BY MOUTH EVERY DAY, Disp: 90 capsule, Rfl: 0    Current Allergies     Allergies as of 03/13/2023 - Reviewed 03/13/2023   Allergen Reaction Noted   • Sertraline Hives 02/22/2021            The following portions of the patient's history were reviewed and updated as appropriate: allergies, current medications, past family history, past medical history, past social history, past surgical history and problem list      Past Medical History:   Diagnosis Date   • Acute non-recurrent frontal sinusitis 3/13/2023   • Allergic rhinitis    • Disease of thyroid gland     hypothyroidism   • HL (hearing loss)    • Hypertension    • Liver hemangioma    • AMY (obstructive sleep apnea) 1/16/2023   • Tinnitus        Past Surgical History:   Procedure Laterality Date • COLONOSCOPY  07/15/2022   • KNEE SURGERY Left 12/2006   • RECTAL PROLAPSE REPAIR     • RECTAL SURGERY      prolapsed rectum repair x2   • WISDOM TOOTH EXTRACTION         Family History   Problem Relation Age of Onset   • Hypertension Mother    • Hyperlipidemia Mother    • Dementia Mother    • Heart attack Father         x2   • Hypertension Father    • Hyperlipidemia Father    • Stroke Father    • Skin cancer Father    • Migraines Sister    • Migraines Brother    • Breast cancer Maternal Grandmother 54         Medications have been verified  Objective   /80 (BP Location: Right arm, Patient Position: Sitting, Cuff Size: Adult)   Pulse 80   Temp 98 7 °F (37 1 °C) (Tympanic)   Resp 16   SpO2 100%   No LMP recorded  (Menstrual status: Birth Control)  Physical Exam     Physical Exam  Vitals and nursing note reviewed  Constitutional:       General: She is not in acute distress  Appearance: Normal appearance  She is well-developed  HENT:      Head: Normocephalic and atraumatic  Jaw: There is normal jaw occlusion  Right Ear: Hearing, tympanic membrane, ear canal and external ear normal       Left Ear: Hearing, tympanic membrane, ear canal and external ear normal       Nose: Mucosal edema and congestion present  No rhinorrhea  Right Turbinates: Swollen  Left Turbinates: Swollen  Right Sinus: Frontal sinus tenderness present  Left Sinus: Frontal sinus tenderness present  Mouth/Throat:      Lips: Pink  Mouth: Mucous membranes are moist       Dentition: Normal dentition  Pharynx: Uvula midline  Posterior oropharyngeal erythema (cobblestoning appearance) present  No oropharyngeal exudate  Tonsils: No tonsillar exudate  1+ on the right  1+ on the left  Eyes:      Conjunctiva/sclera: Conjunctivae normal       Pupils: Pupils are equal, round, and reactive to light  Cardiovascular:      Rate and Rhythm: Normal rate and regular rhythm  Pulses: Normal pulses  Heart sounds: Normal heart sounds  Pulmonary:      Effort: Pulmonary effort is normal       Breath sounds: Normal breath sounds and air entry  No stridor, decreased air movement or transmitted upper airway sounds  No decreased breath sounds, wheezing, rhonchi or rales  Musculoskeletal:         General: Normal range of motion  Cervical back: Normal range of motion and neck supple  Skin:     General: Skin is warm and dry  Capillary Refill: Capillary refill takes less than 2 seconds  Neurological:      General: No focal deficit present  Mental Status: She is alert and oriented to person, place, and time     Psychiatric:         Mood and Affect: Mood normal          Behavior: Behavior normal  No

## 2023-12-02 LAB
BACTERIA SPEC ANAEROBE CULT: NO GROWTH
BACTERIA SPEC ANAEROBE CULT: NO GROWTH
BACTERIA TISS AEROBE CULT: NO GROWTH
BACTERIA TISS AEROBE CULT: NO GROWTH
GRAM STN SPEC: NORMAL
GRAM STN SPEC: NORMAL

## 2023-12-04 LAB
FUNGUS SPEC CULT: NORMAL
FUNGUS SPEC CULT: NORMAL

## 2023-12-05 PROCEDURE — 88307 TISSUE EXAM BY PATHOLOGIST: CPT | Performed by: PATHOLOGY

## 2023-12-05 PROCEDURE — 88311 DECALCIFY TISSUE: CPT | Performed by: PATHOLOGY

## 2023-12-11 LAB
FUNGUS SPEC CULT: NORMAL
FUNGUS SPEC CULT: NORMAL

## 2023-12-12 LAB
MYCOBACTERIUM SPEC CULT: NORMAL
MYCOBACTERIUM SPEC CULT: NORMAL
RHODAMINE-AURAMINE STN SPEC: NORMAL
RHODAMINE-AURAMINE STN SPEC: NORMAL

## 2023-12-18 LAB
FUNGUS SPEC CULT: NORMAL
FUNGUS SPEC CULT: NORMAL

## 2023-12-26 LAB — FUNGUS SPEC CULT: NORMAL

## 2024-01-02 LAB — FUNGUS SPEC CULT: NORMAL

## 2024-01-08 DIAGNOSIS — D89.89 AUTOIMMUNE DISORDER (HCC): Primary | ICD-10-CM

## 2024-04-19 DIAGNOSIS — I10 PRIMARY HYPERTENSION: ICD-10-CM

## 2024-04-19 RX ORDER — LOSARTAN POTASSIUM 50 MG/1
50 TABLET ORAL
Qty: 90 TABLET | Refills: 1 | Status: SHIPPED | OUTPATIENT
Start: 2024-04-19

## 2024-05-14 ENCOUNTER — OFFICE VISIT (OUTPATIENT)
Dept: FAMILY MEDICINE CLINIC | Facility: CLINIC | Age: 54
End: 2024-05-14
Payer: COMMERCIAL

## 2024-05-14 VITALS
HEART RATE: 61 BPM | WEIGHT: 143.8 LBS | HEIGHT: 63 IN | TEMPERATURE: 98.4 F | BODY MASS INDEX: 25.48 KG/M2 | RESPIRATION RATE: 16 BRPM | SYSTOLIC BLOOD PRESSURE: 128 MMHG | OXYGEN SATURATION: 97 % | DIASTOLIC BLOOD PRESSURE: 88 MMHG

## 2024-05-14 DIAGNOSIS — E78.2 MIXED HYPERLIPIDEMIA: ICD-10-CM

## 2024-05-14 DIAGNOSIS — E55.9 VITAMIN D DEFICIENCY: ICD-10-CM

## 2024-05-14 DIAGNOSIS — Z11.59 NEED FOR HEPATITIS C SCREENING TEST: ICD-10-CM

## 2024-05-14 DIAGNOSIS — Z00.00 ANNUAL PHYSICAL EXAM: Primary | ICD-10-CM

## 2024-05-14 DIAGNOSIS — E03.4 IDIOPATHIC ATROPHIC HYPOTHYROIDISM: ICD-10-CM

## 2024-05-14 DIAGNOSIS — I10 PRIMARY HYPERTENSION: ICD-10-CM

## 2024-05-14 PROCEDURE — 99396 PREV VISIT EST AGE 40-64: CPT

## 2024-05-14 NOTE — PROGRESS NOTES
ADULT ANNUAL PHYSICAL  [facility]    NAME: Sid Sexton  AGE: 54 y.o. SEX: female  : 1970     DATE: 2024     Assessment and Plan:     Problem List Items Addressed This Visit     Vitamin D deficiency    Relevant Orders    Vitamin D 25 hydroxy    Idiopathic atrophic hypothyroidism    Relevant Orders    TSH, 3rd generation with Free T4 reflex    Primary hypertension    Relevant Orders    CBC and differential    Comprehensive metabolic panel    Urinalysis with microscopic    Mixed hyperlipidemia    Relevant Orders    Lipid Panel with Direct LDL reflex   Other Visit Diagnoses     Annual physical exam    -  Primary    Relevant Orders    TSH, 3rd generation with Free T4 reflex    CBC and differential    Comprehensive metabolic panel    Lipid Panel with Direct LDL reflex    Vitamin D 25 hydroxy    Urinalysis with microscopic    Need for hepatitis C screening test        Relevant Orders    Hepatitis C Antibody            Immunizations and preventive care screenings were discussed with patient today. Appropriate education was printed on patient's after visit summary.    Counseling:  Exercise: the importance of regular exercise/physical activity was discussed. Recommend exercise 3-5 times per week for at least 30 minutes.          Return in about 3 months (around 2024).     Chief Complaint:     Chief Complaint   Patient presents with   • Physical Exam     physical      History of Present Illness:     Adult Annual Physical   Patient here for a comprehensive physical exam. The patient reports no problems.    Diet and Physical Activity  Diet/Nutrition: well balanced diet, limited junk food, consuming 3-5 servings of fruits/vegetables daily, and adequate fiber intake.   Exercise: walking, 5-7 times a week on average, and 30-60 minutes on average.      Depression Screening  PHQ-2/9 Depression Screening    Little interest or pleasure in doing  things: 0 - not at all  Feeling down, depressed, or hopeless: 0 - not at all  PHQ-2 Score: 0  PHQ-2 Interpretation: Negative depression screen       General Health  Sleep: sleeps well.   Hearing: normal - bilateral.  Vision: no vision problems, goes for regular eye exams, and wears glasses.   Dental: regular dental visits, brushes teeth twice daily, and flosses teeth occasionally.       /GYN Health  Follows with gynecology? yes   Patient is: premenopausal  Last menstrual period: unknown  Contraceptive method:  none .    Advanced Care Planning  Do you have an advanced directive? no  Do you have a durable medical power of ? no  ACP document given to the patient? no     Review of Systems:     Review of Systems   Past Medical History:     Past Medical History:   Diagnosis Date   • Acute non-recurrent frontal sinusitis 2023   • Allergic rhinitis    • HL (hearing loss)    • Liver hemangioma    • Tinnitus       Past Surgical History:     Past Surgical History:   Procedure Laterality Date   • BONE BIOPSY Left 2023    Procedure: HALLUX BONE BIOPSY;  Surgeon: Mehdi Palomo DPM;  [facility];  Service: Podiatry   • BONE BIOPSY Left 2023    Procedure: HALLUX BONE BIOPSY;  Surgeon: Mehdi Palomo DPM;  [facility];  Service: Podiatry   • COLONOSCOPY  07/15/2022   • KNEE SURGERY Left 2006   • RECTAL PROLAPSE REPAIR     • RECTAL SURGERY      prolapsed rectum repair x2   • WISDOM TOOTH EXTRACTION        Social History:     Social History     Socioeconomic History   • Marital status:      Spouse name: None   • Number of children: None   • Years of education: None   • Highest education level: None   Occupational History   • None   Tobacco Use   • Smoking status: Former     Current packs/day: 0.00     Types: Cigarettes     Quit date: 1994     Years since quittin.3     Passive exposure: Past   • Smokeless tobacco: Never   Vaping Use   • Vaping status: Never  Used   Substance and Sexual Activity   • Alcohol use: Yes     Comment: holiday only-twice/year   • Drug use: Never   • Sexual activity: Not Currently     Partners: Male     Birth control/protection: None   Other Topics Concern   • None   Social History Narrative   • None     Social Determinants of Health     Financial Resource Strain: Not on file   Food Insecurity: Not on file   Transportation Needs: Not on file   Physical Activity: Not on file   Stress: Not on file   Social Connections: Not on file   Intimate Partner Violence: Not on file   Housing Stability: Not on file      Family History:     Family History   Problem Relation Age of Onset   • Hypertension Mother    • Hyperlipidemia Mother    • Dementia Mother    • Anxiety disorder Mother    • Heart attack Father         x2   • Hypertension Father    • Hyperlipidemia Father    • Stroke Father    • Skin cancer Father    • Colon cancer Father    • Migraines Sister    • No Known Problems Sister    • Migraines Brother    • No Known Problems Son    • No Known Problems Son    • No Known Problems Daughter    • Breast cancer Maternal Grandmother 55   • No Known Problems Maternal Aunt    • No Known Problems Maternal Aunt    • No Known Problems Paternal Aunt    • No Known Problems Paternal Aunt    • Breast cancer Paternal Aunt 70      Current Medications:     Current Outpatient Medications   Medication Sig Dispense Refill   • ALPRAZolam (XANAX) 0.25 mg tablet if needed     • Cholecalciferol 125 MCG (5000 UT) capsule Daily     • Cyanocobalamin (VITAMIN B-12 PO) Take by mouth in the morning     • fluticasone (FLONASE) 50 mcg/act nasal spray 2 sprays into each nostril daily As needed     • levothyroxine 75 mcg tablet Take 1 tablet (75 mcg total) by mouth daily 90 tablet 1   • losartan (COZAAR) 50 mg tablet TAKE ONE TABLET BY MOUTH AT BEDTIME 90 tablet 1   • meclizine (ANTIVERT) 12.5 MG tablet Take 12.5 mg by mouth if needed for dizziness     • venlafaxine (EFFEXOR-XR) 75 mg  "24 hr capsule Take 1 capsule (75 mg total) by mouth daily 90 capsule 1     No current facility-administered medications for this visit.      Allergies:     Allergies   Allergen Reactions   • Sertraline Hives and Rash      Physical Exam:     /88 (BP Location: Left arm, Patient Position: Sitting, Cuff Size: Standard)   Pulse 61   Temp 98.4 °F (36.9 °C) (Tympanic)   Resp 16   Ht 5' 3\" (1.6 m)   Wt 65.2 kg (143 lb 12.8 oz)   LMP  (LMP Unknown)   SpO2 97%   BMI 25.47 kg/m²     Physical Exam     Steven Peña MD  [facility] PRIMARY CARE    "

## 2024-05-22 ENCOUNTER — NURSE TRIAGE (OUTPATIENT)
Age: 54
End: 2024-05-22

## 2024-05-22 NOTE — TELEPHONE ENCOUNTER
"  Patient c/o poison ivy on arms, legs, and face. She is using otc spray for the itching. Denies trouble breathing. She states the last time this happened she was prescribed a steroid dose pack. She is requesting another order for the steroid.   Please follow up with patient.   Reason for Disposition   Rash involves more than one-fourth of the body    Answer Assessment - Initial Assessment Questions  1. APPEARANCE of RASH: \"Describe the rash.\"       Red rash     2. LOCATION: \"Where is the rash located?\"       arms, legs, stomach, and face    4. ONSET: \"When did the rash begin?\"       Rash began on Saturday    5. ITCHING: \"Does the rash itch?\" If Yes, ask: \"How bad is it?\"    - MILD - doesn't interfere with normal activities    - MODERATE-SEVERE: interferes with work, school, sleep, or other activities     6. PREGNANCY: \"Is there any chance you are pregnant?\" \"When was your last menstrual period?\"    Protocols used: Poison Ivy - [place] - [facility]-ADULT-    "

## 2024-05-23 ENCOUNTER — TELEMEDICINE (OUTPATIENT)
Dept: FAMILY MEDICINE CLINIC | Facility: CLINIC | Age: 54
End: 2024-05-23
Payer: COMMERCIAL

## 2024-05-23 ENCOUNTER — NURSE TRIAGE (OUTPATIENT)
Dept: OTHER | Facility: OTHER | Age: 54
End: 2024-05-23

## 2024-05-23 DIAGNOSIS — L23.7 POISON IVY: Primary | ICD-10-CM

## 2024-05-23 PROCEDURE — 99213 OFFICE O/P EST LOW 20 MIN: CPT

## 2024-05-23 RX ORDER — PREDNISONE 20 MG/1
40 TABLET ORAL DAILY
Qty: 10 TABLET | Refills: 0 | Status: SHIPPED | OUTPATIENT
Start: 2024-05-23 | End: 2024-05-28

## 2024-05-23 NOTE — PROGRESS NOTES
Virtual Regular Visit    Verification of patient location:    Patient is located at Home in the following state in which I hold an active license [place]      Assessment/Plan:    Problem List Items Addressed This Visit     Poison ivy - Primary     With prednisone 40 mg daily for 5 days.  Local care.  May take Benadryl as needed.  If not improving getting worse get back to us otherwise follow-up as needed         Relevant Medications    predniSONE 20 mg tablet            Reason for visit is   Chief Complaint   Patient presents with   • Virtual Awv   • Virtual Regular Visit          Encounter provider Steven Peña MD      Recent Visits  No visits were found meeting these conditions.  Showing recent visits within past 7 days and meeting all other requirements  Today's Visits  Date Type Provider Dept   05/23/24 Telemedicine MD [address] [facility]   Showing today's visits and meeting all other requirements  Future Appointments  No visits were found meeting these conditions.  Showing future appointments within next 150 days and meeting all other requirements       The patient was identified by name and date of birth. Sid Sexton was informed that this is a telemedicine visit and that the visit is being conducted through the [employer] platform. She agrees to proceed..  My office door was closed. No one else was in the room.  She acknowledged consent and understanding of privacy and security of the video platform. The patient has agreed to participate and understands they can discontinue the visit at any time.    Patient is aware this is a billable service.     Subjective  Sid Sexton is a 54 y.o. female  .      Patient requested virtual visit as she was working in the yard and broke out in poison ivy on her both upper extremity trunk and on the face and it spreading and has marked itching.  No fever or chills.  No redness.  No other specific complaint.         Past Medical History:   Diagnosis  Date   • Acute non-recurrent frontal sinusitis 03/13/2023   • Allergic rhinitis    • HL (hearing loss)    • Liver hemangioma    • Tinnitus        Past Surgical History:   Procedure Laterality Date   • BONE BIOPSY Left 8/29/2023    Procedure: HALLUX BONE BIOPSY;  Surgeon: Mehdi Palomo DPM;  [facility];  Service: Podiatry   • BONE BIOPSY Left 11/29/2023    Procedure: HALLUX BONE BIOPSY;  Surgeon: Mehdi Palomo DPM;  [facility];  Service: Podiatry   • COLONOSCOPY  07/15/2022   • KNEE SURGERY Left 12/2006   • RECTAL PROLAPSE REPAIR     • RECTAL SURGERY      prolapsed rectum repair x2   • WISDOM TOOTH EXTRACTION         Current Outpatient Medications   Medication Sig Dispense Refill   • predniSONE 20 mg tablet Take 2 tablets (40 mg total) by mouth daily for 5 days 10 tablet 0   • ALPRAZolam (XANAX) 0.25 mg tablet if needed     • Cholecalciferol 125 MCG (5000 UT) capsule Daily     • Cyanocobalamin (VITAMIN B-12 PO) Take by mouth in the morning     • fluticasone (FLONASE) 50 mcg/act nasal spray 2 sprays into each nostril daily As needed     • levothyroxine 75 mcg tablet Take 1 tablet (75 mcg total) by mouth daily 90 tablet 1   • losartan (COZAAR) 50 mg tablet TAKE ONE TABLET BY MOUTH AT BEDTIME 90 tablet 1   • meclizine (ANTIVERT) 12.5 MG tablet Take 12.5 mg by mouth if needed for dizziness     • venlafaxine (EFFEXOR-XR) 75 mg 24 hr capsule Take 1 capsule (75 mg total) by mouth daily 90 capsule 1     No current facility-administered medications for this visit.        Allergies   Allergen Reactions   • Sertraline Hives and Rash       Review of Systems   Constitutional:  Negative for chills and fever.   Respiratory:  Negative for shortness of breath.    Cardiovascular:  Negative for chest pain.   Musculoskeletal:  Negative for arthralgias and myalgias.   Skin:  Positive for rash.   Neurological:  Negative for dizziness, light-headedness and headaches.       Video Exam    There were no  vitals filed for this visit.    Physical Exam  Constitutional:       General: She is not in acute distress.     Appearance: Normal appearance. She is not ill-appearing.   HENT:      Head: Normocephalic.   Eyes:      General: No scleral icterus.        Right eye: No discharge.         Left eye: No discharge.      Extraocular Movements: Extraocular movements intact.      Conjunctiva/sclera: Conjunctivae normal.   Pulmonary:      Effort: Pulmonary effort is normal.   Skin:     Findings: Rash (On face trunk and both upper extremity consistent with poison ivy) present.   Neurological:      Mental Status: She is alert and oriented to person, place, and time.   Psychiatric:         Mood and Affect: Mood normal.         Behavior: Behavior normal.          Visit Time  Total Visit Duration: 22

## 2024-05-23 NOTE — ASSESSMENT & PLAN NOTE
With prednisone 40 mg daily for 5 days.  Local care.  May take Benadryl as needed.  If not improving getting worse get back to us otherwise follow-up as needed  
No

## 2024-05-23 NOTE — PROGRESS NOTES
Virtual AWV Consent    Verification of patient location:    Patient is located at {[facility] Virtual Patient Location:15318} in the following state in which I hold an active license {[facility] virtual patient location:[address]47}    The patient was identified by name and date of birth. Sid Sexton was informed that this is a telemedicine visit and that the visit is being conducted through {[facility] VIRTUAL VISIT MEDIUM:88958}.  {Telemedicine confidentiality :44378} {Telemedicine participants:60515}  She acknowledged consent and understanding of privacy and security of the video platform. The patient has agreed to participate and understands they can discontinue the visit at any time.    Patient is aware this is a billable service.     Reason for visit is ***    Encounter provider Steven Peña MD    Provider located at [facility]  [address] [place] [id number] [phone]      Recent Visits  No visits were found meeting these conditions.  Showing recent visits within past 7 days and meeting all other requirements  Today's Visits  Date Type Provider Dept   24 Telemedicine Steven Peña MD [facility]   Showing today's visits and meeting all other requirements  Future Appointments  No visits were found meeting these conditions.  Showing future appointments within next 150 days and meeting all other requirements           Visit Time  Total Visit Duration: ***  Ambulatory Visit  Name: Sid Sexton      : 1970      MRN: [id number]  Encounter Provider: Steven Peña MD  Encounter Date: 2024   Encounter department: [facility] PRIMARY [facility]ARE    Assessment & Plan   {There are no diagnoses linked to this encounter. (Refresh or delete this SmartLink)}     Preventive health issues were discussed with patient, and age appropriate screening tests were ordered as noted in patient's After Visit Summary.  Personalized health advice and appropriate referrals for health education or preventive services given if needed, as noted in patient's After Visit Summary.    History of Present Illness   {Disappearing Hyperlinks I Encounters * My Last Note * Since Last Visit * History :28934}  [facility]   Patient Care Team:  Steven Peña MD as PCP - General (Internal Medicine)    Review of Systems  Medical History Reviewed by provider this encounter:       Annual Wellness Visit Questionnaire   Annual Wellness Visit     No results found.    Objective   {Disappearing Hyperlinks   Review Vitals * Enter New Vitals * Results Review * Labs * Imaging * Cardiology * Procedures * Lung Cancer Screening :00083}  LMP  (LMP Unknown)     Physical Exam  Administrative Statements {Disappearing Hyperlinks I  Level of Service * PCMH/PCSP:21892}  {Time Spent Statement (Optional):21840}

## 2024-05-23 NOTE — TELEPHONE ENCOUNTER
Patient confirmed an appointment for today 5/23 at 1620 at [facility] [address]

## 2024-05-23 NOTE — PATIENT INSTRUCTIONS
Medicare Preventive Visit Patient Instructions  Thank you for completing your Welcome to Medicare Visit or Medicare Annual Wellness Visit today. Your next wellness visit will be due in one year (5/24/2025).  The screening/preventive services that you may require over the next 5-10 years are detailed below. Some tests may not apply to you based off risk factors and/or age. Screening tests ordered at today's visit but not completed yet may show as past due. Also, please note that scanned in results may not display below.  Preventive Screenings:  Service Recommendations Previous Testing/Comments   Colorectal Cancer Screening  * Colonoscopy    * Fecal Occult Blood Test (FOBT)/Fecal Immunochemical Test (FIT)  * Fecal DNA/Cologuard Test  * Flexible Sigmoidoscopy Age: 45-75 years old   Colonoscopy: every 10 years (may be performed more frequently if at higher risk)  OR  FOBT/FIT: every 1 year  OR  Cologuard: every 3 years  OR  Sigmoidoscopy: every 5 years  Screening may be recommended earlier than age 45 if at higher risk for colorectal cancer. Also, an individualized decision between you and your healthcare provider will decide whether screening between the ages of 76-85 would be appropriate. Colonoscopy: 07/15/2022  FOBT/FIT: Not on file  Cologuard: Not on file  Sigmoidoscopy: Not on file          Breast Cancer Screening Age: 40+ years old  Frequency: every 1-2 years  Not required if history of left and right mastectomy Mammogram: 05/15/2023        Cervical Cancer Screening Between the ages of 21-29, pap smear recommended once every 3 years.   Between the ages of 30-65, can perform pap smear with HPV co-testing every 5 years.   Recommendations may differ for women with a history of total hysterectomy, cervical cancer, or abnormal pap smears in past. Pap Smear: 04/06/2022        Hepatitis C Screening Once for adults born between 1945 and 1965  More frequently in patients at high risk for Hepatitis C Hep C Antibody: Not on  file        Diabetes Screening 1-2 times per year if you're at risk for diabetes or have pre-diabetes Fasting glucose: 84 mg/dL (8/16/2023)  A1C: No results in last 5 years (No results in last 5 years)      Cholesterol Screening Once every 5 years if you don't have a lipid disorder. May order more often based on risk factors. Lipid panel: 05/25/2023          Other Preventive Screenings Covered by Medicare:  Abdominal Aortic Aneurysm (AAA) Screening: covered once if your at risk. You're considered to be at risk if you have a family history of AAA.  Lung Cancer Screening: covers low dose CT scan once per year if you meet all of the following conditions: (1) Age 55-77; (2) No signs or symptoms of lung cancer; (3) Current smoker or have quit smoking within the last 15 years; (4) You have a tobacco smoking history of at least 20 pack years (packs per day multiplied by number of years you smoked); (5) You get a written order from a healthcare provider.  Glaucoma Screening: covered annually if you're considered high risk: (1) You have diabetes OR (2) Family history of glaucoma OR (3)  aged 50 and older OR (4)  American aged 65 and older  Osteoporosis Screening: covered every 2 years if you meet one of the following conditions: (1) You're estrogen deficient and at risk for osteoporosis based off medical history and other findings; (2) Have a vertebral abnormality; (3) On glucocorticoid therapy for more than 3 months; (4) Have primary hyperparathyroidism; (5) On osteoporosis medications and need to assess response to drug therapy.   Last bone density test (DXA Scan): Not on file.  HIV Screening: covered annually if you're between the age of 15-65. Also covered annually if you are younger than 15 and older than 65 with risk factors for HIV infection. For pregnant patients, it is covered up to 3 times per pregnancy.    Immunizations:  Immunization Recommendations   Influenza Vaccine Annual influenza  vaccination during flu season is recommended for all persons aged >= 6 months who do not have contraindications   Pneumococcal Vaccine   * Pneumococcal conjugate vaccine = PCV13 (Prevnar 13), PCV15 (Vaxneuvance), PCV20 (Prevnar 20)  * Pneumococcal polysaccharide vaccine = PPSV23 (Pneumovax) Adults 19-63 yo with certain risk factors or if 65+ yo  If never received any pneumonia vaccine: recommend Prevnar 20 (PCV20)  Give PCV20 if previously received 1 dose of PCV13 or PPSV23   Hepatitis B Vaccine 3 dose series if at intermediate or high risk (ex: diabetes, end stage renal disease, liver disease)   Respiratory syncytial virus (RSV) Vaccine - COVERED BY MEDICARE PART D  * RSVPreF3 (Arexvy) CDC recommends that adults 60 years of age and older may receive a single dose of RSV vaccine using shared clinical decision-making (SCDM)   Tetanus (Td) Vaccine - COST NOT COVERED BY MEDICARE PART B Following completion of primary series, a booster dose should be given every 10 years to maintain immunity against tetanus. Td may also be given as tetanus wound prophylaxis.   Tdap Vaccine - COST NOT COVERED BY MEDICARE PART B Recommended at least once for all adults. For pregnant patients, recommended with each pregnancy.   Shingles Vaccine (Shingrix) - COST NOT COVERED BY MEDICARE PART B  2 shot series recommended in those 19 years and older who have or will have weakened immune systems or those 50 years and older     Health Maintenance Due:      Topic Date Due   • Hepatitis C Screening  Never done   • HIV Screening  06/14/2024 (Originally 1/24/1985)   • Cervical Cancer Screening  04/06/2025   • Breast Cancer Screening: Mammogram  05/15/2025   • Colorectal Cancer Screening  07/13/2029     Immunizations Due:      Topic Date Due   • DTaP,Tdap,and Td Vaccines (1 - Tdap) Never done   • COVID-19 Vaccine (4 - 2023-24 season) 09/01/2023     Advance Directives   What are advance directives?  Advance directives are legal documents that state  your wishes and plans for medical care. These plans are made ahead of time in case you lose your ability to make decisions for yourself. Advance directives can apply to any medical decision, such as the treatments you want, and if you want to donate organs.   What are the types of advance directives?  There are many types of advance directives, and each state has rules about how to use them. You may choose a combination of any of the following:  Living will:  This is a written record of the treatment you want. You can also choose which treatments you do not want, which to limit, and which to stop at a certain time. This includes surgery, medicine, IV fluid, and tube feedings.   Durable power of  for healthcare (DPAHC):  This is a written record that states who you want to make healthcare choices for you when you are unable to make them for yourself. This person, called a proxy, is usually a family member or a friend. You may choose more than 1 proxy.  Do not resuscitate (DNR) order:  A DNR order is used in case your heart stops beating or you stop breathing. It is a request not to have certain forms of treatment, such as CPR. A DNR order may be included in other types of advance directives.  Medical directive:  This covers the care that you want if you are in a coma, near death, or unable to make decisions for yourself. You can list the treatments you want for each condition. Treatment may include pain medicine, surgery, blood transfusions, dialysis, IV or tube feedings, and a ventilator (breathing machine).  Values history:  This document has questions about your views, beliefs, and how you feel and think about life. This information can help others choose the care that you would choose.  Why are advance directives important?  An advance directive helps you control your care. Although spoken wishes may be used, it is better to have your wishes written down. Spoken wishes can be misunderstood, or not  followed. Treatments may be given even if you do not want them. An advance directive may make it easier for your family to make difficult choices about your care.   Weight Management   Why it is important to manage your weight:  Being overweight increases your risk of health conditions such as heart disease, high blood pressure, type 2 diabetes, and certain types of cancer. It can also increase your risk for osteoarthritis, sleep apnea, and other respiratory problems. Aim for a slow, steady weight loss. Even a small amount of weight loss can lower your risk of health problems.  How to lose weight safely:  A safe and healthy way to lose weight is to eat fewer calories and get regular exercise. You can lose up about 1 pound a week by decreasing the number of calories you eat by 500 calories each day.   Healthy meal plan for weight management:  A healthy meal plan includes a variety of foods, contains fewer calories, and helps you stay healthy. A healthy meal plan includes the following:  Eat whole-grain foods more often.  A healthy meal plan should contain fiber. Fiber is the part of grains, fruits, and vegetables that is not broken down by your body. Whole-grain foods are healthy and provide extra fiber in your diet. Some examples of whole-grain foods are whole-wheat breads and pastas, oatmeal, brown rice, and bulgur.  Eat a variety of vegetables every day.  Include dark, leafy greens such as spinach, kale, tavo greens, and mustard greens. Eat yellow and orange vegetables such as carrots, sweet potatoes, and winter squash.   Eat a variety of fruits every day.  Choose fresh or canned fruit (canned in its own juice or light syrup) instead of juice. Fruit juice has very little or no fiber.  Eat low-fat dairy foods.  Drink fat-free (skim) milk or 1% milk. Eat fat-free yogurt and low-fat cottage cheese. Try low-fat cheeses such as mozzarella and other reduced-fat cheeses.  Choose meat and other protein foods that are  low in fat.  Choose beans or other legumes such as split peas or lentils. Choose fish, skinless poultry (chicken or turkey), or lean cuts of red meat (beef or pork). Before you cook meat or poultry, cut off any visible fat.   Use less fat and oil.  Try baking foods instead of frying them. Add less fat, such as margarine, sour cream, regular salad dressing and mayonnaise to foods. Eat fewer high-fat foods. Some examples of high-fat foods include french fries, doughnuts, ice cream, and cakes.  Eat fewer sweets.  Limit foods and drinks that are high in sugar. This includes candy, cookies, regular soda, and sweetened drinks.  Exercise:  Exercise at least 30 minutes per day on most days of the week. Some examples of exercise include walking, biking, dancing, and swimming. You can also fit in more physical activity by taking the stairs instead of the elevator or parking farther away from stores. Ask your healthcare provider about the best exercise plan for you.      © Copyright [employer] 2018 Information is for End User's use only and may not be sold, redistributed or otherwise used for commercial purposes. All illustrations and images included in CareNotes® are the copyrighted property of A.D.A.M., Inc. or [employer]

## 2024-05-30 ENCOUNTER — TELEPHONE (OUTPATIENT)
Dept: FAMILY MEDICINE CLINIC | Facility: CLINIC | Age: 54
End: 2024-05-30

## 2024-05-30 DIAGNOSIS — L23.7 POISON IVY: Primary | ICD-10-CM

## 2024-05-30 RX ORDER — METHYLPREDNISOLONE 4 MG/1
TABLET ORAL
Qty: 1 EACH | Refills: 0 | Status: SHIPPED | OUTPATIENT
Start: 2024-05-30

## 2024-05-30 NOTE — TELEPHONE ENCOUNTER
Apt has had poison ivy for the past 2 weeks. She would like something called in for her. She has pain, a lot of itching, she is not sleeping. She did leave a VM please disregard Vm.  Poison is all over her body.   Hands , arm, stomach.

## 2024-06-13 ENCOUNTER — TELEPHONE (OUTPATIENT)
Age: 54
End: 2024-06-13

## 2024-06-13 ENCOUNTER — TELEPHONE (OUTPATIENT)
Dept: FAMILY MEDICINE CLINIC | Facility: CLINIC | Age: 54
End: 2024-06-13

## 2024-06-13 DIAGNOSIS — L23.7 POISON IVY: Primary | ICD-10-CM

## 2024-06-13 RX ORDER — CLOBETASOL PROPIONATE 0.5 MG/G
CREAM TOPICAL 2 TIMES DAILY
Qty: 30 G | Refills: 1 | Status: SHIPPED | OUTPATIENT
Start: 2024-06-13

## 2024-06-13 NOTE — TELEPHONE ENCOUNTER
Patient called in to ask Dr. Peña if he would consider ordering:  Clobetasol Propionate Ointment   .05%, 30gm   She has another bout of poison ivy after gardening despite precautions.  She heard the above cream really works well for it.  She would also like a refill on the prednisone if possible.  Please advise.

## 2024-06-15 DIAGNOSIS — F41.1 GENERALIZED ANXIETY DISORDER: ICD-10-CM

## 2024-06-15 DIAGNOSIS — E03.4 IDIOPATHIC ATROPHIC HYPOTHYROIDISM: ICD-10-CM

## 2024-06-15 RX ORDER — LEVOTHYROXINE SODIUM 0.07 MG/1
75 TABLET ORAL DAILY
Qty: 90 TABLET | Refills: 0 | Status: SHIPPED | OUTPATIENT
Start: 2024-06-15

## 2024-06-15 RX ORDER — VENLAFAXINE HYDROCHLORIDE 75 MG/1
75 CAPSULE, EXTENDED RELEASE ORAL DAILY
Qty: 90 CAPSULE | Refills: 1 | Status: SHIPPED | OUTPATIENT
Start: 2024-06-15

## 2024-08-06 ENCOUNTER — APPOINTMENT (OUTPATIENT)
Dept: LAB | Facility: AMBULARY SURGERY CENTER | Age: 54
End: 2024-08-06
Payer: COMMERCIAL

## 2024-08-06 DIAGNOSIS — E03.4 IDIOPATHIC ATROPHIC HYPOTHYROIDISM: ICD-10-CM

## 2024-08-06 DIAGNOSIS — E55.9 VITAMIN D DEFICIENCY: ICD-10-CM

## 2024-08-06 DIAGNOSIS — Z00.00 ANNUAL PHYSICAL EXAM: ICD-10-CM

## 2024-08-06 DIAGNOSIS — E78.2 MIXED HYPERLIPIDEMIA: ICD-10-CM

## 2024-08-06 DIAGNOSIS — I10 PRIMARY HYPERTENSION: ICD-10-CM

## 2024-08-06 DIAGNOSIS — Z11.59 NEED FOR HEPATITIS C SCREENING TEST: ICD-10-CM

## 2024-08-06 LAB
25(OH)D3 SERPL-MCNC: 52.8 NG/ML (ref 30–100)
ALBUMIN SERPL BCG-MCNC: 4 G/DL (ref 3.5–5)
ALP SERPL-CCNC: 99 U/L (ref 34–104)
ALT SERPL W P-5'-P-CCNC: 22 U/L (ref 7–52)
ANION GAP SERPL CALCULATED.3IONS-SCNC: 8 MMOL/L (ref 4–13)
AST SERPL W P-5'-P-CCNC: 20 U/L (ref 13–39)
BACTERIA UR QL AUTO: NORMAL /HPF
BASOPHILS # BLD AUTO: 0.04 THOUSANDS/ÂΜL (ref 0–0.1)
BASOPHILS NFR BLD AUTO: 1 % (ref 0–1)
BILIRUB SERPL-MCNC: 0.31 MG/DL (ref 0.2–1)
BILIRUB UR QL STRIP: NEGATIVE
BUN SERPL-MCNC: 13 MG/DL (ref 5–25)
CALCIUM SERPL-MCNC: 9.2 MG/DL (ref 8.4–10.2)
CHLORIDE SERPL-SCNC: 106 MMOL/L (ref 96–108)
CHOLEST SERPL-MCNC: 205 MG/DL
CLARITY UR: CLEAR
CO2 SERPL-SCNC: 26 MMOL/L (ref 21–32)
COLOR UR: NORMAL
CREAT SERPL-MCNC: 0.74 MG/DL (ref 0.6–1.3)
EOSINOPHIL # BLD AUTO: 0.11 THOUSAND/ÂΜL (ref 0–0.61)
EOSINOPHIL NFR BLD AUTO: 2 % (ref 0–6)
ERYTHROCYTE [DISTWIDTH] IN BLOOD BY AUTOMATED COUNT: 12.6 % (ref 11.6–15.1)
GFR SERPL CREATININE-BSD FRML MDRD: 92 ML/MIN/1.73SQ M
GLUCOSE P FAST SERPL-MCNC: 87 MG/DL (ref 65–99)
GLUCOSE UR STRIP-MCNC: NEGATIVE MG/DL
HCT VFR BLD AUTO: 38.5 % (ref 34.8–46.1)
HCV AB SER QL: NORMAL
HDLC SERPL-MCNC: 72 MG/DL
HGB BLD-MCNC: 12.3 G/DL (ref 11.5–15.4)
HGB UR QL STRIP.AUTO: NEGATIVE
IMM GRANULOCYTES # BLD AUTO: 0.01 THOUSAND/UL (ref 0–0.2)
IMM GRANULOCYTES NFR BLD AUTO: 0 % (ref 0–2)
KETONES UR STRIP-MCNC: NEGATIVE MG/DL
LDLC SERPL CALC-MCNC: 120 MG/DL (ref 0–100)
LEUKOCYTE ESTERASE UR QL STRIP: NEGATIVE
LYMPHOCYTES # BLD AUTO: 1.36 THOUSANDS/ÂΜL (ref 0.6–4.47)
LYMPHOCYTES NFR BLD AUTO: 30 % (ref 14–44)
MCH RBC QN AUTO: 30.4 PG (ref 26.8–34.3)
MCHC RBC AUTO-ENTMCNC: 31.9 G/DL (ref 31.4–37.4)
MCV RBC AUTO: 95 FL (ref 82–98)
MONOCYTES # BLD AUTO: 0.51 THOUSAND/ÂΜL (ref 0.17–1.22)
MONOCYTES NFR BLD AUTO: 11 % (ref 4–12)
NEUTROPHILS # BLD AUTO: 2.53 THOUSANDS/ÂΜL (ref 1.85–7.62)
NEUTS SEG NFR BLD AUTO: 56 % (ref 43–75)
NITRITE UR QL STRIP: NEGATIVE
NON-SQ EPI CELLS URNS QL MICRO: NORMAL /HPF
NRBC BLD AUTO-RTO: 0 /100 WBCS
PH UR STRIP.AUTO: 5.5 [PH]
PLATELET # BLD AUTO: 360 THOUSANDS/UL (ref 149–390)
PMV BLD AUTO: 10.1 FL (ref 8.9–12.7)
POTASSIUM SERPL-SCNC: 4.3 MMOL/L (ref 3.5–5.3)
PROT SERPL-MCNC: 6.4 G/DL (ref 6.4–8.4)
PROT UR STRIP-MCNC: NEGATIVE MG/DL
RBC # BLD AUTO: 4.04 MILLION/UL (ref 3.81–5.12)
RBC #/AREA URNS AUTO: NORMAL /HPF
SODIUM SERPL-SCNC: 140 MMOL/L (ref 135–147)
SP GR UR STRIP.AUTO: 1.02 (ref 1–1.03)
T4 FREE SERPL-MCNC: 0.43 NG/DL (ref 0.61–1.12)
TRIGL SERPL-MCNC: 63 MG/DL
TSH SERPL DL<=0.05 MIU/L-ACNC: 19.41 UIU/ML (ref 0.45–4.5)
UROBILINOGEN UR STRIP-ACNC: <2 MG/DL
WBC # BLD AUTO: 4.56 THOUSAND/UL (ref 4.31–10.16)
WBC #/AREA URNS AUTO: NORMAL /HPF

## 2024-08-06 PROCEDURE — 84443 ASSAY THYROID STIM HORMONE: CPT

## 2024-08-06 PROCEDURE — 80061 LIPID PANEL: CPT

## 2024-08-06 PROCEDURE — 84439 ASSAY OF FREE THYROXINE: CPT

## 2024-08-06 PROCEDURE — 86803 HEPATITIS C AB TEST: CPT

## 2024-08-06 PROCEDURE — 36415 COLL VENOUS BLD VENIPUNCTURE: CPT

## 2024-08-06 PROCEDURE — 82306 VITAMIN D 25 HYDROXY: CPT

## 2024-08-06 PROCEDURE — 80053 COMPREHEN METABOLIC PANEL: CPT

## 2024-08-06 PROCEDURE — 85025 COMPLETE CBC W/AUTO DIFF WBC: CPT

## 2024-08-12 DIAGNOSIS — E03.8 OTHER SPECIFIED HYPOTHYROIDISM: Primary | ICD-10-CM

## 2024-08-12 RX ORDER — LEVOTHYROXINE SODIUM 100 UG/1
100 TABLET ORAL
Qty: 30 TABLET | Refills: 0 | Status: SHIPPED | OUTPATIENT
Start: 2024-08-12

## 2024-08-27 ENCOUNTER — OFFICE VISIT (OUTPATIENT)
Age: 54
End: 2024-08-27
Payer: COMMERCIAL

## 2024-08-27 VITALS
WEIGHT: 153 LBS | HEIGHT: 63 IN | TEMPERATURE: 97.3 F | SYSTOLIC BLOOD PRESSURE: 138 MMHG | DIASTOLIC BLOOD PRESSURE: 84 MMHG | RESPIRATION RATE: 16 BRPM | BODY MASS INDEX: 27.11 KG/M2 | HEART RATE: 66 BPM | OXYGEN SATURATION: 98 %

## 2024-08-27 DIAGNOSIS — I10 PRIMARY HYPERTENSION: ICD-10-CM

## 2024-08-27 DIAGNOSIS — F41.1 GENERALIZED ANXIETY DISORDER: ICD-10-CM

## 2024-08-27 DIAGNOSIS — E78.2 MIXED HYPERLIPIDEMIA: ICD-10-CM

## 2024-08-27 DIAGNOSIS — E03.4 IDIOPATHIC ATROPHIC HYPOTHYROIDISM: Primary | ICD-10-CM

## 2024-08-27 PROBLEM — Z01.818 PRE-OP EXAM: Status: RESOLVED | Noted: 2023-08-16 | Resolved: 2024-08-27

## 2024-08-27 PROCEDURE — 99213 OFFICE O/P EST LOW 20 MIN: CPT

## 2024-08-27 NOTE — PROGRESS NOTES
Assessment/Plan:         Problem List Items Addressed This Visit     Generalized anxiety disorder     Under control.    Continue current medication.    We will re-evaluate at next office visit.           Idiopathic atrophic hypothyroidism - Primary     TSH elevated and T4 is low levothyroxine been increased to 100 mcg recently so we will repeat the blood work before next visit           Relevant Orders    TSH, 3rd generation with Free T4 reflex    Primary hypertension     Under control.    Continue current medication.    We will re-evaluate at next office visit.           Mixed hyperlipidemia     Under control.    Continue current medication.    We will re-evaluate at next office visit.                Subjective:      Patient ID: Sid Sexton is a 54 y.o. female.    Patient here for review of chronic medical problems and  the labs and imaging if it is applicable.  Currently has no specific complaints other than mentioned in the review of systems  Denies chest pain, SOB, cough, abdominal pain, nausea, vomiting, fever, chills, lightheadedness, dizziness,headache, tingling or numbness.No bowel or bladder problem.          The following portions of the patient's history were reviewed and updated as appropriate:   Past Medical History:  She has a past medical history of Acute non-recurrent frontal sinusitis (03/13/2023), Allergic rhinitis, HL (hearing loss), Liver hemangioma, and Tinnitus.,  _______________________________________________________________________  Medical Problems:  does not have any pertinent problems on file.,  _______________________________________________________________________  Past Surgical History:   has a past surgical history that includes Rectal prolapse repair; Knee surgery (Left, 12/2006); Rectal surgery; Colonoscopy (07/15/2022); [place] tooth extraction; Bone biopsy (Left, 8/29/2023); and Bone biopsy (Left,  11/29/2023).,  _______________________________________________________________________  Family History:  family history includes Anxiety disorder in her mother; Breast cancer (age of onset: 55) in her maternal grandmother; Breast cancer (age of onset: 70) in her paternal aunt; Colon cancer in her father; Dementia in her mother; Heart attack in her father; Hyperlipidemia in her father and mother; Hypertension in her father and mother; Migraines in her brother and sister; No Known Problems in her daughter, maternal aunt, maternal aunt, paternal aunt, paternal aunt, sister, son, and son; Skin cancer in her father; Stroke in her father.,  _______________________________________________________________________  Social History:   reports that she quit smoking about 30 years ago. Her smoking use included cigarettes. She has been exposed to tobacco smoke. She has never used smokeless tobacco. She reports current alcohol use. She reports that she does not use drugs.,  _______________________________________________________________________  Allergies:  is allergic to sertraline..  _______________________________________________________________________  Current Outpatient Medications   Medication Sig Dispense Refill   • ALPRAZolam (XANAX) 0.25 mg tablet if needed     • Cholecalciferol 125 MCG (5000 UT) capsule Daily     • clobetasol (TEMOVATE) 0.05 % cream Apply topically 2 (two) times a day 30 g 1   • Cyanocobalamin (VITAMIN B-12 PO) Take by mouth in the morning     • fluticasone (FLONASE) 50 mcg/act nasal spray 2 sprays into each nostril daily As needed     • levothyroxine 100 mcg tablet Take 1 tablet (100 mcg total) by mouth daily in the early morning 30 tablet 0   • losartan (COZAAR) 50 mg tablet TAKE ONE TABLET BY MOUTH AT BEDTIME 90 tablet 1   • meclizine (ANTIVERT) 12.5 MG tablet Take 12.5 mg by mouth if needed for dizziness     • venlafaxine (EFFEXOR-XR) 75 mg 24 hr capsule TAKE ONE CAPSULE BY MOUTH EVERY DAY 90  "capsule 1     No current facility-administered medications for this visit.     _______________________________________________________________________  Review of Systems   Constitutional:  Negative for chills, fatigue and fever.   HENT:  Negative for congestion, ear pain, rhinorrhea, sneezing and sore throat.    Eyes:  Negative for redness, itching and visual disturbance.   Respiratory:  Negative for cough, chest tightness and shortness of breath.    Cardiovascular:  Negative for chest pain, palpitations and leg swelling.   Gastrointestinal:  Negative for abdominal pain, blood in stool, diarrhea, nausea and vomiting.   Endocrine: Negative for cold intolerance and heat intolerance.   Genitourinary:  Negative for dysuria, frequency and urgency.   Musculoskeletal:  Negative for arthralgias, back pain and myalgias.   Skin:  Negative for color change and rash.   Neurological:  Negative for dizziness, weakness, light-headedness, numbness and headaches.   Hematological:  Does not bruise/bleed easily.   Psychiatric/Behavioral:  Negative for agitation, behavioral problems and confusion.          Objective:  Vitals:    08/27/24 1517   BP: 138/84   BP Location: Left arm   Patient Position: Sitting   Cuff Size: Standard   Pulse: 66   Resp: 16   Temp: (!) 97.3 °F (36.3 °C)   TempSrc: Tympanic   SpO2: 98%   Weight: 69.4 kg (153 lb)   Height: 5' 3\" (1.6 m)     Body mass index is 27.1 kg/m².     Physical Exam  Vitals and nursing note reviewed.   Constitutional:       General: She is not in acute distress.     Appearance: Normal appearance. She is not ill-appearing, toxic-appearing or diaphoretic.   HENT:      Head: Normocephalic and atraumatic.      Nose: Nose normal. No congestion.      Mouth/Throat:      Mouth: Mucous membranes are moist.   Eyes:      General: No scleral icterus.        Right eye: No discharge.         Left eye: No discharge.      Extraocular Movements: Extraocular movements intact.      Conjunctiva/sclera: " Conjunctivae normal.      Pupils: Pupils are equal, round, and reactive to light.   Cardiovascular:      Rate and Rhythm: Normal rate and regular rhythm.      Pulses: Normal pulses.      Heart sounds: Normal heart sounds. No murmur heard.     No gallop.   Pulmonary:      Effort: Pulmonary effort is normal. No respiratory distress.      Breath sounds: Normal breath sounds. No wheezing, rhonchi or rales.   Abdominal:      General: Abdomen is flat. Bowel sounds are normal. There is no distension.      Palpations: Abdomen is soft.      Tenderness: There is no abdominal tenderness. There is no right CVA tenderness, left CVA tenderness or guarding.   Musculoskeletal:         General: No swelling or tenderness. Normal range of motion.      Cervical back: Normal range of motion and neck supple. No rigidity.      Right lower leg: No edema.      Left lower leg: No edema.   Lymphadenopathy:      Cervical: No cervical adenopathy.   Skin:     General: Skin is warm.      Capillary Refill: Capillary refill takes 2 to 3 seconds.      Coloration: Skin is not jaundiced.      Findings: No bruising or rash.   Neurological:      General: No focal deficit present.      Mental Status: She is alert and oriented to person, place, and time. Mental status is at baseline.      Motor: No weakness.      Gait: Gait normal.   Psychiatric:         Mood and Affect: Mood normal.         Behavior: Behavior normal.

## 2024-08-27 NOTE — ASSESSMENT & PLAN NOTE
TSH elevated and T4 is low levothyroxine been increased to 100 mcg recently so we will repeat the blood work before next visit

## 2024-09-11 DIAGNOSIS — E03.8 OTHER SPECIFIED HYPOTHYROIDISM: ICD-10-CM

## 2024-09-11 RX ORDER — LEVOTHYROXINE SODIUM 100 UG/1
TABLET ORAL
Qty: 30 TABLET | Refills: 5 | Status: SHIPPED | OUTPATIENT
Start: 2024-09-11 | End: 2024-09-12 | Stop reason: SDUPTHER

## 2024-09-12 ENCOUNTER — TELEPHONE (OUTPATIENT)
Age: 54
End: 2024-09-12

## 2024-09-12 DIAGNOSIS — E03.4 IDIOPATHIC ATROPHIC HYPOTHYROIDISM: Primary | ICD-10-CM

## 2024-09-12 RX ORDER — LEVOTHYROXINE SODIUM 100 UG/1
100 TABLET ORAL DAILY
Qty: 90 TABLET | Refills: 2 | Status: SHIPPED | OUTPATIENT
Start: 2024-09-12

## 2024-11-14 ENCOUNTER — TELEPHONE (OUTPATIENT)
Dept: OBGYN CLINIC | Facility: CLINIC | Age: 54
End: 2024-11-14

## 2024-12-27 DIAGNOSIS — I10 PRIMARY HYPERTENSION: ICD-10-CM

## 2024-12-27 DIAGNOSIS — F41.1 GENERALIZED ANXIETY DISORDER: ICD-10-CM

## 2024-12-27 RX ORDER — LOSARTAN POTASSIUM 50 MG/1
50 TABLET ORAL
Qty: 90 TABLET | Refills: 1 | Status: SHIPPED | OUTPATIENT
Start: 2024-12-27

## 2024-12-27 RX ORDER — VENLAFAXINE HYDROCHLORIDE 75 MG/1
75 CAPSULE, EXTENDED RELEASE ORAL DAILY
Qty: 90 CAPSULE | Refills: 1 | Status: SHIPPED | OUTPATIENT
Start: 2024-12-27

## 2024-12-27 NOTE — TELEPHONE ENCOUNTER
Reason for call:   [x] Refill   [] Prior Auth  [] Other:     Office:   [x] PCP/Provider -   [] Specialty/Provider -     Medication: losartan (COZAAR) 50 mg    Dose/Frequency:  TAKE ONE TABLET BY MOUTH AT BEDTIME     Quantity: 90    Pharmacy: [facility] [address] [place] [address]     Medication: venlafaxine (EFFEXOR-XR) 75 mg    Dose/Frequency:  TAKE ONE CAPSULE BY MOUTH EVERY DAY     Quantity: 90    Pharmacy: [facility] [address] [place] [address]     Does the patient have enough for 3 days?   [x] Yes   [] No - Send as HP to POD

## 2025-02-10 ENCOUNTER — TELEPHONE (OUTPATIENT)
Age: 55
End: 2025-02-10

## 2025-02-10 DIAGNOSIS — M62.838 MUSCLE SPASM: Primary | ICD-10-CM

## 2025-02-10 DIAGNOSIS — F41.1 GENERALIZED ANXIETY DISORDER: Primary | ICD-10-CM

## 2025-02-10 RX ORDER — CYCLOBENZAPRINE HCL 5 MG
5 TABLET ORAL 2 TIMES DAILY
Qty: 30 TABLET | Refills: 0 | Status: SHIPPED | OUTPATIENT
Start: 2025-02-10

## 2025-02-10 NOTE — TELEPHONE ENCOUNTER
Patient wanted to see if the Dr would write the muscle relaxer; due to her back pain she is in a lot of pain from shoveling she is schedule to see Dr tomorrow but can not wait until then to get relief. She tried OTC ibuprofen. Please review and advise that patient if approve or not.     If Rx approve please send to;  [facility] [id number] - [place] - [address] [facility]   Phone: [phone]  Fax: [phone]

## 2025-02-11 ENCOUNTER — OFFICE VISIT (OUTPATIENT)
Age: 55
End: 2025-02-11
Payer: COMMERCIAL

## 2025-02-11 VITALS
HEIGHT: 63 IN | DIASTOLIC BLOOD PRESSURE: 58 MMHG | RESPIRATION RATE: 16 BRPM | BODY MASS INDEX: 24.45 KG/M2 | TEMPERATURE: 99 F | SYSTOLIC BLOOD PRESSURE: 100 MMHG | OXYGEN SATURATION: 97 % | HEART RATE: 79 BPM | WEIGHT: 138 LBS

## 2025-02-11 DIAGNOSIS — E03.4 IDIOPATHIC ATROPHIC HYPOTHYROIDISM: ICD-10-CM

## 2025-02-11 DIAGNOSIS — E78.2 MIXED HYPERLIPIDEMIA: ICD-10-CM

## 2025-02-11 DIAGNOSIS — I10 PRIMARY HYPERTENSION: Primary | ICD-10-CM

## 2025-02-11 DIAGNOSIS — Z00.6 ENCOUNTER FOR EXAMINATION FOR NORMAL COMPARISON OR CONTROL IN CLINICAL RESEARCH PROGRAM: ICD-10-CM

## 2025-02-11 DIAGNOSIS — Z12.31 ENCOUNTER FOR SCREENING MAMMOGRAM FOR BREAST CANCER: ICD-10-CM

## 2025-02-11 DIAGNOSIS — M54.50 ACUTE MIDLINE LOW BACK PAIN WITHOUT SCIATICA: ICD-10-CM

## 2025-02-11 DIAGNOSIS — F41.1 GENERALIZED ANXIETY DISORDER: ICD-10-CM

## 2025-02-11 DIAGNOSIS — L40.50 PSORIATIC ARTHRITIS (HCC): ICD-10-CM

## 2025-02-11 PROCEDURE — 99214 OFFICE O/P EST MOD 30 MIN: CPT | Performed by: INTERNAL MEDICINE

## 2025-02-11 RX ORDER — ALPRAZOLAM 0.25 MG/1
0.25 TABLET ORAL 2 TIMES DAILY PRN
Qty: 60 TABLET | Refills: 0 | Status: SHIPPED | OUTPATIENT
Start: 2025-02-11

## 2025-02-11 NOTE — ASSESSMENT & PLAN NOTE
Diet controlled, follow-up with labs  Encouraged low animal fat diet  Orders:    CBC and differential; Future    Comprehensive metabolic panel; Future    Lipid panel; Future

## 2025-02-11 NOTE — ASSESSMENT & PLAN NOTE
Stable  Continue current medications due for labs follow-up in 3 months  Orders:    CBC and differential; Future    Comprehensive metabolic panel; Future

## 2025-02-11 NOTE — PROGRESS NOTES
Name: Sid Sexton      : 1970      MRN: [id number]  Encounter Provider: Yesenia Chavez MD  Encounter Date: 2025   Encounter department: [facility] PRIMARY CARE  :  Assessment & Plan  Primary hypertension  Stable  Continue current medications due for labs follow-up in 3 months  Orders:    CBC and differential; Future    Comprehensive metabolic panel; Future    Generalized anxiety disorder  Stable, continue current medication       Mixed hyperlipidemia  Diet controlled, follow-up with labs  Encouraged low animal fat diet  Orders:    CBC and differential; Future    Comprehensive metabolic panel; Future    Lipid panel; Future    Idiopathic atrophic hypothyroidism  Last TSH levels were elevated and medication adjusted  Patient is recommend to complete her labs that are been already ordered  Medication to be adjusted pending labs  Orders:    TSH + Free T4; Future    Psoriatic arthritis (HCC)         Encounter for screening mammogram for breast cancer    Orders:    Mammo screening bilateral w 3d and cad; Future    Acute midline low back pain without sciatica  Secondary to shoveling snow over the weekend  Improved with muscle relaxant, continue OTC medications, follow-up for persistent symptoms              History of Present Illness     Patient comes for follow-up of multiple chronic medical conditions.  Noticing low backache since shoveling snow over the weekend which improved with muscle relaxant.  Denies other subjective complaints.  HPI  Review of Systems   Constitutional:  Negative for appetite change, chills, diaphoresis, fatigue, fever and unexpected weight change.   Respiratory:  Negative for apnea, cough, choking, chest tightness, shortness of breath, wheezing and stridor.    Cardiovascular:  Negative for chest pain, palpitations and leg swelling.   Gastrointestinal:  Negative for abdominal distention, abdominal pain, anal bleeding, blood in stool, constipation, diarrhea, nausea  and vomiting.   Genitourinary:  Negative for decreased urine volume, difficulty urinating, frequency and urgency.   Musculoskeletal:  Positive for back pain. Negative for arthralgias and myalgias.   Neurological:  Negative for dizziness, light-headedness, numbness and headaches.         Past Medical History   Past Medical History:   Diagnosis Date    Acute non-recurrent frontal sinusitis 03/13/2023    Allergic rhinitis     HL (hearing loss)     Liver hemangioma     Tinnitus      Past Surgical History:   Procedure Laterality Date    BONE BIOPSY Left 8/29/2023    Procedure: HALLUX BONE BIOPSY;  Surgeon: Mehdi Palomo DPM;  Location: [facility];  Service: Podiatry    BONE BIOPSY Left 11/29/2023    Procedure: HALLUX BONE BIOPSY;  Surgeon: Mehdi Palomo DPM;  Location: [facility];  Service: Podiatry    COLONOSCOPY  07/15/2022    KNEE SURGERY Left 12/2006    RECTAL PROLAPSE REPAIR      RECTAL SURGERY      prolapsed rectum repair x2    WISDOM TOOTH EXTRACTION       Family History   Problem Relation Age of Onset    Hypertension Mother     Hyperlipidemia Mother     Dementia Mother     Anxiety disorder Mother     Heart attack Father         x2    Hypertension Father     Hyperlipidemia Father     Stroke Father     Skin cancer Father     Colon cancer Father     Migraines Sister     No Known Problems Sister     Migraines Brother     No Known Problems Son     No Known Problems Son     No Known Problems Daughter     Breast cancer Maternal Grandmother 55    No Known Problems Maternal Aunt     No Known Problems Maternal Aunt     No Known Problems Paternal Aunt     No Known Problems Paternal Aunt     Breast cancer Paternal Aunt 70      reports that she quit smoking about 31 years ago. Her smoking use included cigarettes. She has been exposed to tobacco smoke. She has never used smokeless tobacco. She reports current alcohol use. She reports that she does not use drugs.  Current Outpatient Medications on File Prior to  "Visit   Medication Sig Dispense Refill    ALPRAZolam (XANAX) 0.25 mg tablet Take 1 tablet (0.25 mg total) by mouth 2 (two) times a day as needed for anxiety 60 tablet 0    Cholecalciferol 125 MCG (5000 UT) capsule Daily      Cyanocobalamin (VITAMIN B-12 PO) Take by mouth in the morning      cyclobenzaprine (FLEXERIL) 5 mg tablet Take 1 tablet (5 mg total) by mouth 2 (two) times a day 30 tablet 0    fluticasone (FLONASE) 50 mcg/act nasal spray 2 sprays into each nostril daily As needed      levothyroxine 100 mcg tablet Take 1 tablet (100 mcg total) by mouth daily 90 tablet 2    losartan (COZAAR) 50 mg tablet Take 1 tablet (50 mg total) by mouth daily at bedtime 90 tablet 1    meclizine (ANTIVERT) 12.5 MG tablet Take 12.5 mg by mouth if needed for dizziness      venlafaxine (EFFEXOR-XR) 75 mg 24 hr capsule Take 1 capsule (75 mg total) by mouth daily 90 capsule 1    clobetasol (TEMOVATE) 0.05 % cream Apply topically 2 (two) times a day (Patient not taking: Reported on 2/11/2025) 30 g 1     No current facility-administered medications on file prior to visit.     Allergies   Allergen Reactions    Sertraline Hives and Rash      Objective   /58 (BP Location: Left arm, Patient Position: Sitting, Cuff Size: Standard)   Pulse 79   Temp 99 °F (37.2 °C) (Temporal)   Resp 16   Ht 5' 3\" (1.6 m)   Wt 62.6 kg (138 lb)   LMP  (LMP Unknown)   SpO2 97%   BMI 24.45 kg/m²      Physical Exam  Constitutional:       General: She is not in acute distress.     Appearance: Normal appearance. She is normal weight. She is not ill-appearing, toxic-appearing or diaphoretic.   Cardiovascular:      Rate and Rhythm: Normal rate and regular rhythm.      Pulses: Normal pulses.      Heart sounds: Normal heart sounds. No murmur heard.     No gallop.   Pulmonary:      Effort: Pulmonary effort is normal. No respiratory distress.      Breath sounds: Normal breath sounds. No stridor. No wheezing, rhonchi or rales.   Chest:      Chest wall: No " tenderness.   Musculoskeletal:      Thoracic back: Normal.      Lumbar back: Normal.      Right lower leg: No edema.      Left lower leg: No edema.   Neurological:      Mental Status: She is alert and oriented to person, place, and time.

## 2025-02-11 NOTE — ASSESSMENT & PLAN NOTE
Last TSH levels were elevated and medication adjusted  Patient is recommend to complete her labs that are been already ordered  Medication to be adjusted pending labs  Orders:    TSH + Free T4; Future

## 2025-04-14 ENCOUNTER — HOSPITAL ENCOUNTER (OUTPATIENT)
Facility: HOSPITAL | Age: 55
Discharge: HOME | End: 2025-04-14
Payer: COMMERCIAL

## 2025-04-14 VITALS — HEIGHT: 60 IN | BODY MASS INDEX: 27.09 KG/M2 | WEIGHT: 138 LBS

## 2025-04-14 DIAGNOSIS — Z12.31 ENCOUNTER FOR SCREENING MAMMOGRAM FOR BREAST CANCER: ICD-10-CM

## 2025-04-14 PROCEDURE — 77067 SCR MAMMO BI INCL CAD: CPT

## 2025-04-14 PROCEDURE — 77063 BREAST TOMOSYNTHESIS BI: CPT

## 2025-04-22 ENCOUNTER — RESULTS FOLLOW-UP (OUTPATIENT)
Age: 55
End: 2025-04-22

## 2025-05-13 ENCOUNTER — APPOINTMENT (OUTPATIENT)
Dept: URGENT CARE | Facility: CLINIC | Age: 55
End: 2025-05-13
Attending: INTERNAL MEDICINE

## 2025-05-13 DIAGNOSIS — E03.4 IDIOPATHIC ATROPHIC HYPOTHYROIDISM: ICD-10-CM

## 2025-05-13 DIAGNOSIS — I10 PRIMARY HYPERTENSION: ICD-10-CM

## 2025-05-13 DIAGNOSIS — E78.2 MIXED HYPERLIPIDEMIA: ICD-10-CM

## 2025-05-13 LAB
ALBUMIN SERPL BCG-MCNC: 4.2 G/DL (ref 3.5–5)
ALP SERPL-CCNC: 114 U/L (ref 34–104)
ALT SERPL W P-5'-P-CCNC: 30 U/L (ref 7–52)
ANION GAP SERPL CALCULATED.3IONS-SCNC: 5 MMOL/L (ref 4–13)
AST SERPL W P-5'-P-CCNC: 30 U/L (ref 13–39)
BASOPHILS # BLD AUTO: 0.04 THOUSANDS/ÂΜL (ref 0–0.1)
BASOPHILS NFR BLD AUTO: 1 % (ref 0–1)
BILIRUB SERPL-MCNC: 0.41 MG/DL (ref 0.2–1)
BUN SERPL-MCNC: 11 MG/DL (ref 5–25)
CALCIUM SERPL-MCNC: 9.8 MG/DL (ref 8.4–10.2)
CHLORIDE SERPL-SCNC: 106 MMOL/L (ref 96–108)
CHOLEST SERPL-MCNC: 200 MG/DL (ref ?–200)
CO2 SERPL-SCNC: 28 MMOL/L (ref 21–32)
CREAT SERPL-MCNC: 0.62 MG/DL (ref 0.6–1.3)
EOSINOPHIL # BLD AUTO: 0.09 THOUSAND/ÂΜL (ref 0–0.61)
EOSINOPHIL NFR BLD AUTO: 2 % (ref 0–6)
ERYTHROCYTE [DISTWIDTH] IN BLOOD BY AUTOMATED COUNT: 12.2 % (ref 11.6–15.1)
GFR SERPL CREATININE-BSD FRML MDRD: 102 ML/MIN/1.73SQ M
GLUCOSE P FAST SERPL-MCNC: 90 MG/DL (ref 65–99)
HCT VFR BLD AUTO: 40 % (ref 34.8–46.1)
HDLC SERPL-MCNC: 65 MG/DL
HGB BLD-MCNC: 12.9 G/DL (ref 11.5–15.4)
IMM GRANULOCYTES # BLD AUTO: 0.01 THOUSAND/UL (ref 0–0.2)
IMM GRANULOCYTES NFR BLD AUTO: 0 % (ref 0–2)
LDLC SERPL CALC-MCNC: 121 MG/DL (ref 0–100)
LYMPHOCYTES # BLD AUTO: 1.33 THOUSANDS/ÂΜL (ref 0.6–4.47)
LYMPHOCYTES NFR BLD AUTO: 33 % (ref 14–44)
MCH RBC QN AUTO: 30.5 PG (ref 26.8–34.3)
MCHC RBC AUTO-ENTMCNC: 32.3 G/DL (ref 31.4–37.4)
MCV RBC AUTO: 95 FL (ref 82–98)
MONOCYTES # BLD AUTO: 0.65 THOUSAND/ÂΜL (ref 0.17–1.22)
MONOCYTES NFR BLD AUTO: 16 % (ref 4–12)
NEUTROPHILS # BLD AUTO: 1.86 THOUSANDS/ÂΜL (ref 1.85–7.62)
NEUTS SEG NFR BLD AUTO: 48 % (ref 43–75)
NONHDLC SERPL-MCNC: 135 MG/DL
NRBC BLD AUTO-RTO: 0 /100 WBCS
PLATELET # BLD AUTO: 350 THOUSANDS/UL (ref 149–390)
PMV BLD AUTO: 10.6 FL (ref 8.9–12.7)
POTASSIUM SERPL-SCNC: 4.3 MMOL/L (ref 3.5–5.3)
PROT SERPL-MCNC: 6.8 G/DL (ref 6.4–8.4)
RBC # BLD AUTO: 4.23 MILLION/UL (ref 3.81–5.12)
SODIUM SERPL-SCNC: 139 MMOL/L (ref 135–147)
T4 FREE SERPL-MCNC: 1.14 NG/DL (ref 0.61–1.12)
TRIGL SERPL-MCNC: 70 MG/DL (ref ?–150)
TSH SERPL DL<=0.05 MIU/L-ACNC: <0.01 UIU/ML (ref 0.45–4.5)
WBC # BLD AUTO: 3.98 THOUSAND/UL (ref 4.31–10.16)

## 2025-05-13 PROCEDURE — 84443 ASSAY THYROID STIM HORMONE: CPT

## 2025-05-13 PROCEDURE — 85025 COMPLETE CBC W/AUTO DIFF WBC: CPT

## 2025-05-13 PROCEDURE — 84439 ASSAY OF FREE THYROXINE: CPT

## 2025-05-13 PROCEDURE — 80053 COMPREHEN METABOLIC PANEL: CPT

## 2025-05-13 PROCEDURE — 80061 LIPID PANEL: CPT

## 2025-05-15 ENCOUNTER — OFFICE VISIT (OUTPATIENT)
Age: 55
End: 2025-05-15
Payer: COMMERCIAL

## 2025-05-15 VITALS
BODY MASS INDEX: 26.46 KG/M2 | HEART RATE: 72 BPM | HEIGHT: 60 IN | RESPIRATION RATE: 18 BRPM | OXYGEN SATURATION: 98 % | SYSTOLIC BLOOD PRESSURE: 121 MMHG | WEIGHT: 134.8 LBS | TEMPERATURE: 98 F | DIASTOLIC BLOOD PRESSURE: 78 MMHG

## 2025-05-15 DIAGNOSIS — E03.4 IDIOPATHIC ATROPHIC HYPOTHYROIDISM: ICD-10-CM

## 2025-05-15 DIAGNOSIS — Z13.6 ENCOUNTER FOR SCREENING FOR CORONARY ARTERY DISEASE: ICD-10-CM

## 2025-05-15 DIAGNOSIS — Z00.00 ANNUAL PHYSICAL EXAM: Primary | ICD-10-CM

## 2025-05-15 PROCEDURE — 99396 PREV VISIT EST AGE 40-64: CPT

## 2025-05-15 RX ORDER — LEVOTHYROXINE SODIUM 88 UG/1
88 TABLET ORAL
Qty: 100 TABLET | Refills: 3 | Status: SHIPPED | OUTPATIENT
Start: 2025-05-15

## 2025-05-15 RX ORDER — LOSARTAN POTASSIUM 50 MG/1
50 TABLET ORAL EVERY MORNING
COMMUNITY

## 2025-05-15 NOTE — PROGRESS NOTES
Adult Annual Physical  Name: Sid Sexton      : 1970      MRN: [id number]  Encounter Provider: Steven Peña MD  Encounter Date: 5/15/2025   Encounter department: [facility] PRIMARY CARE    :  Assessment & Plan  Annual physical exam         Idiopathic atrophic hypothyroidism    Orders:  •  levothyroxine 88 mcg tablet; Take 1 tablet (88 mcg total) by mouth daily in the early morning  •  TSH, 3rd generation with Free T4 reflex; Future    Encounter for screening for coronary artery disease    Orders:  •  CT coronary calcium score; Future        Preventive Screenings:  - Diabetes Screening: screening up-to-date  - Cholesterol Screening: screening not indicated and has hyperlipidemia   - Hepatitis C screening: screening up-to-date   - HIV screening: patient declines   - Cervical cancer screening: screening up-to-date   - Breast cancer screening: screening up-to-date   - Colon cancer screening: screening up-to-date   - Lung cancer screening: screening not indicated     Immunizations:  - Immunizations due: Tdap and Zoster (Shingrix)    Counseling/Anticipatory Guidance:  - Alcohol: discussed moderation in alcohol intake and recommendations for healthy alcohol use.   - Drug use: discussed harms of illicit drug use and how it can negatively impact mental/physical health.   - Tobacco use: discussed harms of tobacco use and management options for quitting.   - Dental health: discussed importance of regular tooth brushing, flossing, and dental visits.   - Sexual health: discussed sexually transmitted diseases, partner selection, use of condoms, avoidance of unintended pregnancy, and contraceptive alternatives.   - Diet: discussed recommendations for a healthy/well-balanced diet.   - Exercise: the importance of regular exercise/physical activity was discussed. Recommend exercise 3-5 times per week for at least 30 minutes.   - Injury prevention: discussed safety/seat belts, safety helmets, smoke  detectors, carbon monoxide detectors, and smoking near bedding or upholstery.       Depression Screening and Follow-up Plan: Patient was screened for depression during today's encounter. They screened negative with a PHQ-2 score of 0.          History of Present Illness     Adult Annual Physical:  Patient presents for annual physical.     Diet and Physical Activity:  - Diet/Nutrition: well balanced diet, adequate whole grain intake and consuming 3-5 servings of fruits/vegetables daily.  - Exercise: walking, moderate cardiovascular exercise, 5-7 times a week on average and 30-60 minutes on average.    Depression Screening:  - PHQ-2 Score: 0    General Health:  - Sleep: sleeps well and > 8 hours of sleep on average.  - Hearing: normal hearing bilateral ears.  - Vision: most recent eye exam > 1 year ago, no vision problems and wears glasses.  - Dental: regular dental visits, brushes teeth twice daily and floss regularly.    /GYN Health:  - Follows with GYN: yes.   - History of STDs: no    Advanced Care Planning:  - Has an advanced directive?: no    - Has a durable medical POA?: no      Review of Systems   Constitutional:  Negative for chills, fatigue and fever.   HENT:  Negative for congestion, ear pain, rhinorrhea, sneezing and sore throat.    Eyes:  Negative for redness, itching and visual disturbance.   Respiratory:  Negative for cough, chest tightness and shortness of breath.    Cardiovascular:  Negative for chest pain, palpitations and leg swelling.   Gastrointestinal:  Negative for abdominal pain, blood in stool, diarrhea, nausea and vomiting.   Endocrine: Negative for cold intolerance and heat intolerance.   Genitourinary:  Negative for dysuria, frequency and urgency.   Musculoskeletal:  Negative for arthralgias, back pain and myalgias.   Skin:  Negative for color change and rash.   Neurological:  Negative for dizziness, weakness, light-headedness, numbness and headaches.   Hematological:  Does not  bruise/bleed easily.   Psychiatric/Behavioral:  Negative for agitation, behavioral problems and confusion.      Medical History Reviewed by provider this encounter:  Tobacco  Allergies  Meds  Problems  Med Hx  Surg Hx  Fam Hx     .  Past Medical History   Past Medical History:   Diagnosis Date   • Acute non-recurrent frontal sinusitis 03/13/2023   • Allergic rhinitis    • HL (hearing loss)    • Liver hemangioma    • Tinnitus      Past Surgical History:   Procedure Laterality Date   • BONE BIOPSY Left 8/29/2023    Procedure: HALLUX BONE BIOPSY;  Surgeon: Mehdi Palomo DPM;  Location: [facility];  Service: Podiatry   • BONE BIOPSY Left 11/29/2023    Procedure: HALLUX BONE BIOPSY;  Surgeon: Mehdi Palomo DPM;  Location: [facility];  Service: Podiatry   • COLONOSCOPY  07/15/2022   • KNEE SURGERY Left 12/2006   • RECTAL PROLAPSE REPAIR     • RECTAL SURGERY      prolapsed rectum repair x2   • WISDOM TOOTH EXTRACTION       Family History   Problem Relation Age of Onset   • Hypertension Mother    • Hyperlipidemia Mother    • Dementia Mother    • Anxiety disorder Mother    • Heart attack Father         x2   • Hypertension Father    • Hyperlipidemia Father    • Stroke Father    • Skin cancer Father    • Colon cancer Father    • Migraines Sister    • No Known Problems Sister    • Migraines Brother    • No Known Problems Son    • No Known Problems Son    • No Known Problems Daughter    • Breast cancer Maternal Grandmother 55   • No Known Problems Maternal Aunt    • No Known Problems Maternal Aunt    • No Known Problems Paternal Aunt    • No Known Problems Paternal Aunt    • Breast cancer Paternal Aunt 70      reports that she quit smoking about 31 years ago. Her smoking use included cigarettes. She has been exposed to tobacco smoke. She has never used smokeless tobacco. She reports current alcohol use. She reports that she does not use drugs.  Current Outpatient Medications   Medication Instructions   •  ALPRAZolam (XANAX) 0.25 mg, Oral, 2 times daily PRN   • Cholecalciferol 125 MCG (5000 UT) capsule Daily   • Cyanocobalamin (VITAMIN B-12 PO) Daily   • fluticasone (FLONASE) 50 mcg/act nasal spray 2 sprays, Daily   • levothyroxine 88 mcg, Oral, Daily (early morning)   • losartan (COZAAR) 50 mg, Every morning   • meclizine (ANTIVERT) 12.5 mg, As needed   • venlafaxine (EFFEXOR-XR) 75 mg, Oral, Daily   Allergies[1]   Medications Ordered Prior to Encounter[2]   Social History     Tobacco Use   • Smoking status: Former     Current packs/day: 0.00     Types: Cigarettes     Quit date: 1994     Years since quittin.3     Passive exposure: Past   • Smokeless tobacco: Never   Vaping Use   • Vaping status: Never Used   Substance and Sexual Activity   • Alcohol use: Yes     Comment: holiday only-twice/year   • Drug use: Never   • Sexual activity: Not Currently     Partners: Male     Birth control/protection: None       Objective   /78 (BP Location: Left arm, Patient Position: Sitting, Cuff Size: Standard)   Pulse 72   Temp 98 °F (36.7 °C) (Temporal)   Resp 18   Ht 5' (1.524 m)   Wt 61.1 kg (134 lb 12.8 oz)   LMP  (LMP Unknown)   SpO2 98%   BMI 26.33 kg/m²     Physical Exam  Constitutional:       General: She is not in acute distress.     Appearance: Normal appearance. She is not ill-appearing, toxic-appearing or diaphoretic.   HENT:      Head: Normocephalic and atraumatic.      Right Ear: Tympanic membrane, ear canal and external ear normal. There is no impacted cerumen.      Left Ear: Tympanic membrane, ear canal and external ear normal. There is no impacted cerumen.      Nose: No congestion.      Mouth/Throat:      Pharynx: No oropharyngeal exudate or posterior oropharyngeal erythema.     Eyes:      General: No scleral icterus.        Right eye: No discharge.         Left eye: No discharge.      Extraocular Movements: Extraocular movements intact.      Conjunctiva/sclera: Conjunctivae normal.       Pupils: Pupils are equal, round, and reactive to light.       Cardiovascular:      Rate and Rhythm: Normal rate and regular rhythm.      Pulses: Normal pulses.      Heart sounds: Normal heart sounds. No murmur heard.     No gallop.   Pulmonary:      Effort: Pulmonary effort is normal. No respiratory distress.      Breath sounds: Normal breath sounds. No wheezing or rales.   Abdominal:      General: There is no distension.      Tenderness: There is no abdominal tenderness. There is no right CVA tenderness, left CVA tenderness or guarding.     Musculoskeletal:         General: No swelling or tenderness. Normal range of motion.      Cervical back: Normal range of motion. No rigidity.      Right lower leg: No edema.      Left lower leg: No edema.   Lymphadenopathy:      Cervical: No cervical adenopathy.     Skin:     Coloration: Skin is not jaundiced or pale.     Neurological:      Mental Status: She is alert.      Sensory: No sensory deficit.      Motor: No weakness.     Psychiatric:         Mood and Affect: Mood normal.         Behavior: Behavior normal.                [1]  Allergies  Allergen Reactions   • Sertraline Hives and Rash   [2]  Current Outpatient Medications on File Prior to Visit   Medication Sig Dispense Refill   • ALPRAZolam (XANAX) 0.25 mg tablet Take 1 tablet (0.25 mg total) by mouth 2 (two) times a day as needed for anxiety 60 tablet 0   • Cholecalciferol 125 MCG (5000 UT) capsule in the morning.     • Cyanocobalamin (VITAMIN B-12 PO) Take by mouth in the morning     • fluticasone (FLONASE) 50 mcg/act nasal spray 2 sprays into each nostril in the morning. As needed .     • losartan (COZAAR) 50 mg tablet Take 50 mg by mouth every morning     • meclizine (ANTIVERT) 12.5 MG tablet Take 12.5 mg by mouth if needed for dizziness     • venlafaxine (EFFEXOR-XR) 75 mg 24 hr capsule Take 1 capsule (75 mg total) by mouth daily 90 capsule 1   • [DISCONTINUED] levothyroxine 100 mcg tablet Take 1 tablet (100 mcg  total) by mouth daily 90 tablet 2   • [DISCONTINUED] losartan (COZAAR) 50 mg tablet Take 1 tablet (50 mg total) by mouth daily at bedtime (Patient taking differently: Take 50 mg by mouth daily in the early morning) 90 tablet 1   • [DISCONTINUED] clobetasol (TEMOVATE) 0.05 % cream Apply topically 2 (two) times a day (Patient not taking: Reported on 2/11/2025) 30 g 1   • [DISCONTINUED] cyclobenzaprine (FLEXERIL) 5 mg tablet Take 1 tablet (5 mg total) by mouth 2 (two) times a day (Patient not taking: Reported on 5/15/2025) 30 tablet 0     No current facility-administered medications on file prior to visit.

## 2025-05-15 NOTE — PATIENT INSTRUCTIONS
"Patient Education     Routine physical for adults   The Basics   Written by the doctors and editors at [facility]   What is a physical? -- A physical is a routine visit, or \"check-up,\" with your doctor. You might also hear it called a \"wellness visit\" or \"preventive visit.\"  During each visit, the doctor will:   Ask about your physical and mental health   Ask about your habits, behaviors, and lifestyle   Do an exam   Give you vaccines if needed   Talk to you about any medicines you take   Give advice about your health   Answer your questions  Getting regular check-ups is an important part of taking care of your health. It can help your doctor find and treat any problems you have. But it's also important for preventing health problems.  A routine physical is different from a \"sick visit.\" A sick visit is when you see a doctor because of a health concern or problem. Since physicals are scheduled ahead of time, you can think about what you want to ask the doctor.  How often should I get a physical? -- It depends on your age and health. In general, for people age 21 years and older:   If you are younger than 50 years, you might be able to get a physical every 3 years.   If you are 50 years or older, your doctor might recommend a physical every year.  If you have an ongoing health condition, like diabetes or high blood pressure, your doctor will probably want to see you more often.  What happens during a physical? -- In general, each visit will include:   Physical exam - The doctor or nurse will check your height, weight, heart rate, and blood pressure. They will also look at your eyes and ears. They will ask about how you are feeling and whether you have any symptoms that bother you.   Medicines - It's a good idea to bring a list of all the medicines you take to each doctor visit. Your doctor will talk to you about your medicines and answer any questions. Tell them if you are having any side effects that bother you. You " "should also tell them if you are having trouble paying for any of your medicines.   Habits and behaviors - This includes:   Your diet   Your exercise habits   Whether you smoke, drink alcohol, or use drugs   Whether you are sexually active   Whether you feel safe at home  Your doctor will talk to you about things you can do to improve your health and lower your risk of health problems. They will also offer help and support. For example, if you want to quit smoking, they can give you advice and might prescribe medicines. If you want to improve your diet or get more physical activity, they can help you with this, too.   Lab tests, if needed - The tests you get will depend on your age and situation. For example, your doctor might want to check your:   Cholesterol   Blood sugar   Iron level   Vaccines - The recommended vaccines will depend on your age, health, and what vaccines you already had. Vaccines are very important because they can prevent certain serious or deadly infections.   Discussion of screening - \"Screening\" means checking for diseases or other health problems before they cause symptoms. Your doctor can recommend screening based on your age, risk, and preferences. This might include tests to check for:   Cancer, such as breast, prostate, cervical, ovarian, colorectal, prostate, lung, or skin cancer   Sexually transmitted infections, such as chlamydia and gonorrhea   Mental health conditions like depression and anxiety  Your doctor will talk to you about the different types of screening tests. They can help you decide which screenings to have. They can also explain what the results might mean.   Answering questions - The physical is a good time to ask the doctor or nurse questions about your health. If needed, they can refer you to other doctors or specialists, too.  Adults older than 65 years often need other care, too. As you get older, your doctor will talk to you about:   How to prevent falling at " home   Hearing or vision tests   Memory testing   How to take your medicines safely   Making sure that you have the help and support you need at home  All topics are updated as new evidence becomes available and our peer review process is complete.  This topic retrieved from [employer] on: May 02, 2024.  Topic 337157 Version 1.0  Release: 32.4.3 - C32.122  © 2024 UpToDate, Inc. and/or its affiliates. All rights reserved.  Consumer Information Use and Disclaimer   Disclaimer: This generalized information is a limited summary of diagnosis, treatment, and/or medication information. It is not meant to be comprehensive and should be used as a tool to help the user understand and/or assess potential diagnostic and treatment options. It does NOT include all information about conditions, treatments, medications, side effects, or risks that may apply to a specific patient. It is not intended to be medical advice or a substitute for the medical advice, diagnosis, or treatment of a health care provider based on the health care provider's examination and assessment of a patient's specific and unique circumstances. Patients must speak with a health care provider for complete information about their health, medical questions, and treatment options, including any risks or benefits regarding use of medications. This information does not endorse any treatments or medications as safe, effective, or approved for treating a specific patient. UpToDate, Inc. and its affiliates disclaim any warranty or liability relating to this information or the use thereof.The use of this information is governed by the Terms of Use, available at https://www.[employer] Lifestyleuwer.com/en/know/clinical-effectiveness-terms. 2024© UpToDate, Inc. and its affiliates and/or licensors. All rights reserved.  Copyright   © 2024 UpToDate, Inc. and/or its affiliates. All rights reserved.

## 2025-05-15 NOTE — ASSESSMENT & PLAN NOTE
Orders:  •  levothyroxine 88 mcg tablet; Take 1 tablet (88 mcg total) by mouth daily in the early morning  •  TSH, 3rd generation with Free T4 reflex; Future

## 2025-05-22 ENCOUNTER — HOSPITAL ENCOUNTER (OUTPATIENT)
Dept: CT IMAGING | Facility: HOSPITAL | Age: 55
Discharge: HOME | End: 2025-05-22
Payer: COMMERCIAL

## 2025-05-22 DIAGNOSIS — Z13.6 ENCOUNTER FOR SCREENING FOR CORONARY ARTERY DISEASE: ICD-10-CM

## 2025-05-22 PROCEDURE — 75571 CT HRT W/O DYE W/CA TEST: CPT

## 2025-05-30 ENCOUNTER — RESULTS FOLLOW-UP (OUTPATIENT)
Age: 55
End: 2025-05-30

## 2025-06-04 ENCOUNTER — TELEPHONE (OUTPATIENT)
Dept: MAMMOGRAPHY | Facility: CLINIC | Age: 55
End: 2025-06-04

## 2025-06-05 ENCOUNTER — HOSPITAL ENCOUNTER (OUTPATIENT)
Dept: MAMMOGRAPHY | Facility: CLINIC | Age: 55
End: 2025-06-05
Attending: INTERNAL MEDICINE
Payer: COMMERCIAL

## 2025-06-05 ENCOUNTER — RESULTS FOLLOW-UP (OUTPATIENT)
Age: 55
End: 2025-06-05

## 2025-06-05 VITALS — WEIGHT: 134 LBS | BODY MASS INDEX: 26.31 KG/M2 | HEIGHT: 60 IN

## 2025-06-05 DIAGNOSIS — R92.8 ABNORMAL MAMMOGRAM: ICD-10-CM

## 2025-06-05 PROCEDURE — 76642 ULTRASOUND BREAST LIMITED: CPT

## 2025-06-05 PROCEDURE — 77065 DX MAMMO INCL CAD UNI: CPT

## 2025-06-05 PROCEDURE — G0279 TOMOSYNTHESIS, MAMMO: HCPCS

## 2025-06-22 DIAGNOSIS — I10 PRIMARY HYPERTENSION: Primary | ICD-10-CM

## 2025-06-22 DIAGNOSIS — F41.1 GENERALIZED ANXIETY DISORDER: ICD-10-CM

## 2025-06-22 RX ORDER — VENLAFAXINE HYDROCHLORIDE 75 MG/1
75 CAPSULE, EXTENDED RELEASE ORAL DAILY
Qty: 90 CAPSULE | Refills: 1 | Status: SHIPPED | OUTPATIENT
Start: 2025-06-22

## 2025-06-23 RX ORDER — LOSARTAN POTASSIUM 50 MG/1
50 TABLET ORAL
Qty: 90 TABLET | Refills: 1 | Status: SHIPPED | OUTPATIENT
Start: 2025-06-23

## 2025-07-02 ENCOUNTER — OFFICE VISIT (OUTPATIENT)
Dept: URGENT CARE | Facility: CLINIC | Age: 55
End: 2025-07-02

## 2025-07-02 VITALS
DIASTOLIC BLOOD PRESSURE: 84 MMHG | HEART RATE: 78 BPM | RESPIRATION RATE: 16 BRPM | SYSTOLIC BLOOD PRESSURE: 139 MMHG | TEMPERATURE: 98.7 F

## 2025-07-02 DIAGNOSIS — J06.9 ACUTE URI: Primary | ICD-10-CM

## 2025-07-02 DIAGNOSIS — J02.8 ACUTE PHARYNGITIS DUE TO OTHER SPECIFIED ORGANISMS: ICD-10-CM

## 2025-07-02 LAB — S PYO AG THROAT QL: NEGATIVE

## 2025-07-02 PROCEDURE — 87070 CULTURE OTHR SPECIMN AEROBIC: CPT

## 2025-07-02 RX ORDER — PREDNISONE 20 MG/1
40 TABLET ORAL DAILY
Qty: 5 TABLET | Refills: 0 | Status: SHIPPED | OUTPATIENT
Start: 2025-07-02

## 2025-07-02 NOTE — PROGRESS NOTES
[facility]        NAME: Sid Sexton is a 55 y.o. female  : 1970    MRN: [id number]  DATE: 2025  TIME: 7:58 AM    Assessment and Plan   Acute URI [J06.9]  1. Acute URI  predniSONE 20 mg tablet      2. Acute pharyngitis due to other specified organisms  POCT rapid ANTIGEN strepA    Throat culture    Throat culture        Advised to start the prednisone as directed. Rapid strep negative, will send out throat culture.   Most sore throats are viral in nature and improve in 10-14 days   During this time, symptoms control is most important  Warm salt water gargles as often as needed  Hot tea with honey, warm fluids or soups  Ice chips or ice pops if cold is preferred over warm fluids  Throat lozenges with honey or menthol, or throat sprays as directed  Can take tylenol or motrin as directed, motrin will help with pain and inflammation     Patient Instructions       Follow up with PCP in 3-5 days.  Proceed to  ER if symptoms worsen.    If tests are performed, our office will contact you with results only if changes need to made to the care plan discussed with you at the visit. [facility] on [facility]    Chief Complaint     Chief Complaint   Patient presents with    Cold Like Symptoms         History of Present Illness       Has a big  party this week and wants to check if she is sick for the party.    URI   This is a new problem. The current episode started today. The problem has been unchanged. Maximum temperature: 99.7. Associated symptoms include congestion, headaches, a plugged ear sensation, a sore throat and swollen glands. Pertinent negatives include no abdominal pain, chest pain, coughing, diarrhea, dysuria, ear pain, joint pain, joint swelling, nausea, neck pain, rash, rhinorrhea, sinus pain, sneezing, vomiting or wheezing. She has tried acetaminophen and NSAIDs for the symptoms. The treatment provided mild relief.       Review of Systems    Review of Systems   Constitutional:  Positive for fatigue and fever. Negative for chills.   HENT:  Positive for congestion and sore throat. Negative for ear pain, rhinorrhea, sinus pain and sneezing.    Respiratory:  Negative for cough and wheezing.    Cardiovascular:  Negative for chest pain.   Gastrointestinal:  Negative for abdominal pain, diarrhea, nausea and vomiting.   Genitourinary:  Negative for dysuria.   Musculoskeletal:  Negative for joint pain and neck pain.   Skin:  Negative for rash.   Neurological:  Positive for headaches.         Current Medications     Current Medications[1]    Current Allergies     Allergies as of 07/02/2025 - Reviewed 05/15/2025   Allergen Reaction Noted    Sertraline Hives and Rash 02/22/2021            The following portions of the patient's history were reviewed and updated as appropriate: allergies, current medications, past family history, past medical history, past social history, past surgical history and problem list.     Past Medical History[2]    Past Surgical History[3]    Family History[4]      Medications have been verified.        Objective   /84 (BP Location: Left arm, Patient Position: Sitting, Cuff Size: Standard)   Pulse 78   Temp 98.7 °F (37.1 °C) (Oral)   Resp 16   LMP  (LMP Unknown)        Physical Exam     Physical Exam  Vitals and nursing note reviewed.   Constitutional:       General: She is not in acute distress.     Appearance: Normal appearance. She is not ill-appearing.   HENT:      Head: Normocephalic and atraumatic.      Right Ear: Tympanic membrane, ear canal and external ear normal.      Left Ear: Tympanic membrane, ear canal and external ear normal.      Nose: Congestion and rhinorrhea present.      Mouth/Throat:      Mouth: Mucous membranes are moist.      Pharynx: Posterior oropharyngeal erythema present. No oropharyngeal exudate.      Tonsils: No tonsillar exudate or tonsillar abscesses. 3+ on the right. 3+ on the left.     Eyes:       Pupils: Pupils are equal, round, and reactive to light.       Cardiovascular:      Rate and Rhythm: Normal rate and regular rhythm.      Pulses: Normal pulses.      Heart sounds: Normal heart sounds.   Pulmonary:      Effort: Pulmonary effort is normal.      Breath sounds: Normal breath sounds.     Musculoskeletal:      Cervical back: Normal range of motion and neck supple.     Skin:     General: Skin is warm and dry.     Neurological:      Mental Status: She is alert.                        [1]   Current Outpatient Medications:     predniSONE 20 mg tablet, Take 2 tablets (40 mg total) by mouth daily, Disp: 5 tablet, Rfl: 0    ALPRAZolam (XANAX) 0.25 mg tablet, Take 1 tablet (0.25 mg total) by mouth 2 (two) times a day as needed for anxiety, Disp: 60 tablet, Rfl: 0    Cholecalciferol 125 MCG (5000 UT) capsule, in the morning., Disp: , Rfl:     Cyanocobalamin (VITAMIN B-12 PO), Take by mouth in the morning, Disp: , Rfl:     fluticasone (FLONASE) 50 mcg/act nasal spray, 2 sprays into each nostril in the morning. As needed ., Disp: , Rfl:     levothyroxine 88 mcg tablet, Take 1 tablet (88 mcg total) by mouth daily in the early morning, Disp: 100 tablet, Rfl: 3    losartan (COZAAR) 50 mg tablet, TAKE ONE TABLET BY MOUTH AT BEDTIME, Disp: 90 tablet, Rfl: 1    meclizine (ANTIVERT) 12.5 MG tablet, Take 12.5 mg by mouth if needed for dizziness, Disp: , Rfl:     venlafaxine (EFFEXOR-XR) 75 mg 24 hr capsule, TAKE ONE CAPSULE BY MOUTH EVERY DAY, Disp: 90 capsule, Rfl: 1  [2]   Past Medical History:  Diagnosis Date    Acute non-recurrent frontal sinusitis 03/13/2023    Allergic rhinitis     HL (hearing loss)     Liver hemangioma     Tinnitus    [3]   Past Surgical History:  Procedure Laterality Date    BONE BIOPSY Left 8/29/2023    Procedure: HALLUX BONE BIOPSY;  Surgeon: Mehdi Palomo DPM;  Location: [facility];  Service: Podiatry    BONE BIOPSY Left 11/29/2023    Procedure: HALLUX BONE BIOPSY;  Surgeon: Mehdi  Alberto Palomo DPM;  Location: [facility];  Service: Podiatry    COLONOSCOPY  07/15/2022    KNEE SURGERY Left 12/2006    RECTAL PROLAPSE REPAIR      RECTAL SURGERY      prolapsed rectum repair x2    WISDOM TOOTH EXTRACTION     [4]   Family History  Problem Relation Name Age of Onset    Hypertension Mother Fallon     Hyperlipidemia Mother Fallon     Dementia Mother Fallon     Anxiety disorder Mother Fallon     Heart attack Father Kyire         x2    Hypertension Father Kyrie     Hyperlipidemia Father Kyrie     Stroke Father Kyrie     Skin cancer Father Kyrie     Colon cancer Father Kyrie     Migraines Sister      No Known Problems Sister      Migraines Brother      No Known Problems Son      No Known Problems Son      No Known Problems Daughter      Breast cancer Maternal Grandmother Annina 55    No Known Problems Maternal Aunt      No Known Problems Maternal Aunt      No Known Problems Paternal Aunt      No Known Problems Paternal Aunt      Breast cancer Paternal Aunt  70

## 2025-07-04 LAB — BACTERIA THROAT CULT: NORMAL

## 2025-07-17 ENCOUNTER — TELEPHONE (OUTPATIENT)
Dept: URGENT CARE | Facility: MEDICAL CENTER | Age: 55
End: 2025-07-17

## 2025-07-17 NOTE — TELEPHONE ENCOUNTER
Messages received at the [facility] from [employer], attempted to call patient, no answer, unable to leave voicemail.

## (undated) DEVICE — NEEDLE 18 G X 1 1/2

## (undated) DEVICE — NEEDLE 25G X 1 1/2

## (undated) DEVICE — CHLORAPREP HI-LITE 26ML ORANGE

## (undated) DEVICE — STOCKINETTE REGULAR

## (undated) DEVICE — PLUMEPEN PRO 10FT

## (undated) DEVICE — CAST PADDING 4 IN SYNTHETIC NON-STRL

## (undated) DEVICE — 2000CC GUARDIAN II: Brand: GUARDIAN

## (undated) DEVICE — OCCLUSIVE GAUZE STRIP,3% BISMUTH TRIBROMOPHENATE IN PETROLATUM BLEND: Brand: XEROFORM

## (undated) DEVICE — GLOVE INDICATOR PI UNDERGLOVE SZ 7.5 BLUE

## (undated) DEVICE — INTENDED FOR TISSUE SEPARATION, AND OTHER PROCEDURES THAT REQUIRE A SHARP SURGICAL BLADE TO PUNCTURE OR CUT.: Brand: BARD-PARKER ® CARBON RIB-BACK BLADES

## (undated) DEVICE — SPONGE LAP 18 X 18 IN STRL RFD

## (undated) DEVICE — BETHLEHEM UNIVERSAL  MIONR EXT: Brand: CARDINAL HEALTH

## (undated) DEVICE — GLOVE INDICATOR PI UNDERGLOVE SZ 8 BLUE

## (undated) DEVICE — STRETCH BANDAGE: Brand: CURITY

## (undated) DEVICE — SYRINGE 10ML LL

## (undated) DEVICE — SYRINGE 3ML LL

## (undated) DEVICE — GAUZE SPONGES,16 PLY: Brand: CURITY

## (undated) DEVICE — 10FR FRAZIER SUCTION HANDLE: Brand: CARDINAL HEALTH

## (undated) DEVICE — JAMSHIDI BONE MARROW BIOPSY/ASPIRATION NEEDLE, WITH LUER-LOCK ADAPTER 13GX3.5 ASP: Brand: JAMSHIDI

## (undated) DEVICE — SUT ETHILON 4-0 PS-2 18 IN 1667H

## (undated) DEVICE — SCD SEQUENTIAL COMPRESSION COMFORT SLEEVE MEDIUM KNEE LENGTH: Brand: KENDALL SCD

## (undated) DEVICE — GLOVE SRG BIOGEL 7

## (undated) DEVICE — GLOVE SRG BIOGEL ECLIPSE 8

## (undated) DEVICE — KERLIX BANDAGE ROLL: Brand: KERLIX

## (undated) DEVICE — GLOVE INDICATOR PI UNDERGLOVE SZ 7 BLUE

## (undated) DEVICE — COBAN 1 IN UNSTERILE

## (undated) DEVICE — GLOVE SRG BIOGEL 7.5